# Patient Record
Sex: FEMALE | Race: BLACK OR AFRICAN AMERICAN | NOT HISPANIC OR LATINO | Employment: FULL TIME
[De-identification: names, ages, dates, MRNs, and addresses within clinical notes are randomized per-mention and may not be internally consistent; named-entity substitution may affect disease eponyms.]

---

## 2017-01-04 ENCOUNTER — RECORDS - HEALTHEAST (OUTPATIENT)
Dept: ADMINISTRATIVE | Facility: OTHER | Age: 47
End: 2017-01-04

## 2017-01-11 ENCOUNTER — RECORDS - HEALTHEAST (OUTPATIENT)
Dept: ADMINISTRATIVE | Facility: OTHER | Age: 47
End: 2017-01-11

## 2017-03-08 ENCOUNTER — RECORDS - HEALTHEAST (OUTPATIENT)
Dept: ADMINISTRATIVE | Facility: OTHER | Age: 47
End: 2017-03-08

## 2017-03-21 ENCOUNTER — TELEPHONE (OUTPATIENT)
Dept: TRANSPLANT | Facility: CLINIC | Age: 47
End: 2017-03-21

## 2017-03-21 ENCOUNTER — APPOINTMENT (OUTPATIENT)
Dept: TRANSPLANT | Facility: CLINIC | Age: 47
End: 2017-03-21
Attending: INTERNAL MEDICINE
Payer: COMMERCIAL

## 2017-03-21 NOTE — LETTER
May 9, 2017      Alla Shrestha  [address]  [place] [address]        Dear Ms. Shrestha,    You were successfully registered at our Center on March 21, 2017 as potential pre-kidney transplant patient. I have called you on the phone today and left voicemail messages on your home and cell #'s asking you to call me. At this time, your insurance does meet criteria to proceed with scheduling a pre-kidney transplant evaluation. However, at this time your Body Mass Index ( BMI ) is equal to 46 for your height of 5 foot 7 inches and weight of 294 pounds.     The upper BMI limit for kidney transplantation at our Center is 35 which would equal a weight of 230 pounds for your height.  A pre-kidney transplant evaluation can begin at at BMI of 38 which would be equal to a weight of 250 pounds for your height. Your BMI would still need to be at 35 or less in order to actually proceed with a kidney transplant.      Please call me again when your weight is at 250 pounds and I will be happy to arrange appointments here for you to attend a pre kidney transplant evaluation.      If you would like to attend appointments here now to see a transplant surgeon regarding the weight requirements for kidney transplant and to attend the Pre-Kidney Transplant Patient Teaching Class, this can be arranged for you now.  Please call me now if you wish to proceed with these appointments. A full evaluation would be scheduled later when your BMI is equal to 38.     You do have the option of attending our Weight Management Clinic for assistance in losing weight. Please call [phone] to make an appointment for this.     Please feel free to call me with any questions at [phone].     Sincerely,     Carolin Underwood RN BSN Transplant Coordinator     Enclosure: UNOS Letter

## 2017-03-21 NOTE — LETTER
March 21, 2017            Alla Shrestha  [address]  [place] [address]      Dear Dr. Capellan,    We are writing to inform you that Alla Shrestha has completed the referral process with us to be evaluated for a kidney transplant.    Their assigned Transplant Coordinator is Carolin Underwood.  If you should have any questions or concerns, please don t hesitate to contact us.        Regards,           Solid Organ Transplant Intake   [facility], [address]  [address], [facility] [address]  Phone: [phone]  [place] [address]

## 2017-03-21 NOTE — TELEPHONE ENCOUNTER
Intake Progress Note  Organ:  kidney    Referral Came Via (Fax from/phone call from):  fax    Referring Physician:  janet lion    Assigned Coordinator:  Carolin mccoy    Reported Diagnosis that caused the kidney failure ( not CKD)  Hypertension    Best time patient can be reached:  8-4    Type of packet sent:  kidney    Records:  [facility] requested from: [facility],kidney specialist      Insurance information:  Current in epic    History of diabetes:  No    Do you have an endocrinologist?: no           On insulin or oral medication:  No                 History of a kidney biopsy:  Yes         If Yes,when and where:  [facility] 8 1/2 years ago    Past Medical and Surgical History (updated in Epic medical / surgical):             History of  cancer personally:  No                          History of cardiac events:  No                       History abdominal surgeries:             History of previous transplant: No                        Listed or in eval at another transplant center?  No             History of hospitalization in last 12 months:  No                        History of blood transfusion:  Yes               Smoking history:  Yes     Current smoker:  Yes  How much?4 aday        On dialysis: No       If NYOD, estimated GFR:  19  Height:  5'7  Weight:  290  BMI:  45.4    Health Maintenance:  PAP:  No had hysterectomy 18 years ago  Mammo:  Never had one  Colon: never had one  Dental: not up to date its been 1 1/2 years  Vaccines:  up to date   Special Needs (ie--wheelchair, assistance, guardian, interpretor):  no      Informed patient on the need to arrange age appropriate cancer screening, vaccines up to date and dental clearance    Reviewed evaluation process and reminded patient to complete questionnaire, complete medical records release, and review packet prior to evaluation visit     Informed patient that coordinator will review their chart and insurance coverage and if no concerns they will  receive a call from a  to schedule evaluation

## 2017-03-22 ENCOUNTER — MEDICAL CORRESPONDENCE (OUTPATIENT)
Dept: TRANSPLANT | Facility: CLINIC | Age: 47
End: 2017-03-22

## 2017-03-22 ENCOUNTER — RECORDS - HEALTHEAST (OUTPATIENT)
Dept: ADMINISTRATIVE | Facility: OTHER | Age: 47
End: 2017-03-22

## 2017-03-23 ENCOUNTER — MEDICAL CORRESPONDENCE (OUTPATIENT)
Dept: TRANSPLANT | Facility: CLINIC | Age: 47
End: 2017-03-23

## 2017-04-20 ENCOUNTER — RECORDS - HEALTHEAST (OUTPATIENT)
Dept: ADMINISTRATIVE | Facility: OTHER | Age: 47
End: 2017-04-20

## 2017-05-09 NOTE — TELEPHONE ENCOUNTER
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 Liliya Capellan.  Note also documents pt's height as 5 foot 7 inches and weight at 294 pounds to equal a BMI of 46 - does not meet criteria to proceed with a kidney transplant eval at this time.  Will not plan to schedule pre kidney transplant evaluation. Called pt today on her home and cell #'s and LM asking her to call me. Generated letter today in EPIC - recommended that pt recontacts me when her BMI is down to 38 or weight of 250 pounds at which time an eval can be scheduled - will still need BMI of 35 or less to proceed with kidney transplant. Letter routed to  to mail.

## 2017-05-09 NOTE — TELEPHONE ENCOUNTER
Spoke with pt today when she returned my call. I reviewed our BMI criteria for kidney transplant and so on. Pt very interested in making an appt with [facility] as she does not have any plans right now to lose weight but is interested in this. Pt expressed good understanding that she should call me back when her weight is at 250 pounds so a full transplant eval can be scheduled. I explained I have sent her a letter today regarding all content of this conversation. Instructed pt to read letter, be sure to call me back when wt is 250 pounds and call me at any other time she has questions. Pt expressed good understanding of all and was in good agreement with the plan. Transferred pt's call to [facility].

## 2017-05-16 ENCOUNTER — RECORDS - HEALTHEAST (OUTPATIENT)
Dept: ADMINISTRATIVE | Facility: OTHER | Age: 47
End: 2017-05-16

## 2017-05-23 ENCOUNTER — OFFICE VISIT (OUTPATIENT)
Dept: ENDOCRINOLOGY | Facility: CLINIC | Age: 47
End: 2017-05-23

## 2017-05-23 VITALS
SYSTOLIC BLOOD PRESSURE: 148 MMHG | OXYGEN SATURATION: 97 % | HEART RATE: 86 BPM | HEIGHT: 68 IN | BODY MASS INDEX: 43.86 KG/M2 | DIASTOLIC BLOOD PRESSURE: 89 MMHG | WEIGHT: 289.4 LBS

## 2017-05-23 DIAGNOSIS — E66.01 MORBID OBESITY DUE TO EXCESS CALORIES (H): Primary | ICD-10-CM

## 2017-05-23 ASSESSMENT — ENCOUNTER SYMPTOMS: BRUISES/BLEEDS EASILY: 1

## 2017-05-23 NOTE — LETTER
"2017       RE: Alla Shrestha  [address]  [place] [address]     Dear Colleague,    Thank you for referring your patient, Alla Shrestha, to the [facility] MEDICAL WEIGHT MANAGEMENT at [facility]. Please see a copy of my visit note below.          New Medical Weight Management Consult    PATIENT:  Alla Shrestha  MRN:         [id number]  :         1970  RHONDA:         2017    Dear No primary care provider on file.,    I had the pleasure of seeing your patient, Alla Shrestha.  Full intake/assessment done to determine barriers to weight loss success and develop a treatment plan.  Alla Shrestha is a 46 year old female interested in treatment of medical problems associated with weight.  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    Patient Goals Reviewed With Patient 2017   I am interested in attaining a healthier weight to diminish current health problems related to co-morbid conditions: Yes   I am interested in attaining a healthier weight in order to prevent future health problems: Yes       Referring Provider 2017   Please name the provider who referred you to Medical Weight Management.  If you do not know, please answer: \"I Don't Know\". I dont know       Wt Readings from Last 4 Encounters:   17 131.3 kg (289 lb 6.4 oz)     She gained weight for the past 26 years after her pregnancy. She feels that it is a combination of unhealthy food choice and lack of exercise. She feels fatigue and SOB easily so she could not go for a long walk. Upon asked, she loves to eat rice. She stated that she might be overeat at meal. She drinks 4 can of regular ginger ale and 1 energy drink per day.    Diet " recalled:  BF: skipped or bagel+creme cheese, eggx2   Lunch and dinner: left over -- chicken with rice or pasta  Snack: pineapple or granola bar    Weight History Reviewed With Patient 5/23/2017   How concerned are you about your weight? Very Concerned   Would you describe your weight gain as gradual? No   I became overweight: After Pregnancy   The following factors have contributed to my weight gain:  Eating Wrong Types of Food, Eating Too Much, Lack of Exercise   I have tried the following methods to lose weight: Watching Portions or Calories, Exercise, Slim Fast or Other Liquid Diets   I have the following family history of obesity/being overweight:  My mother is overwieght, One or more of my siblings are overweight   Has anyone in your family had weight loss surgery? No       Diet Recall Reviewed With Patient 5/23/2017   How many glasses of juice do you drink in a typical day? 0   How many of glasses of milk do you drink in a typical day? 0   How many 8oz glasses of sugar containing drinks such as [employer]/sweet tea do you drink in a day? 0   How many cans/bottles of sugar pop/soda/tea/sports drinks do you drink in a day? 4   How many cans/bottles of diet pop/soda/tea or sports drink do you drink in a day? 1   How often do you have a drink of alcohol? 2-4 Times a Month   If you do drink, how many drinks might you have in a day? 1 or 2       Eating Habits Reviewed With Patient 5/23/2017   Generally, my meals include foods like these: bread, pasta, rice, potatoes, corn, crackers, sweet dessert, pop, or juice. Everyday   Generally, my meals include foods like these: fried meats, brats, burgers, french fries, pizza, cheese, chips, or ice cream. Everyday   Eat fast food (like [employer], [employer], [employer]aco [employer]). Never   Eat at a buffet or sit-down restaurant. Never   Eat most of my meals in front of the TV or computer. Almost Everyday   Often skip meals, eat at random times, have no regular eating times. Half of the  Week   Rarely sit down for a meal but snack or graze throughout.  Almost Everyday   Eat extra snacks between meals. Almost Everyday   Eat most of my food at the end of the day. Half of the Week   Eat in the middle of the night or wake up at night to eat. Never   Eat extra snacks to prevent or correct low blood sugar. Never   Eat to prevent acid reflux or stomach pain. Never   Worry about not having enough food to eat. Never   Have you been to the food shelf at least a few times this year? No   I eat when I am depressed, stressed, anxious, or bored. Almost Everyday   I eat when I am happy or as a reward. Almost Everyday   I feel hungry all the time even if I just have eaten. Never   Feeling full is important to me. Everyday   Once I start eating, it is hard to stop. Half of the Week   I finish all the food on my plate even if I am already full. Almost Everyday   I can't resist eating delicious food or walk past the good food/smell. Almost Everyday   I eat/snack without noticing that I am eating. Never   I eat when I am preparing the meal. Everyday   I eat more than usual when I see others eating. Never   I have trouble not eating sweets, ice cream, cookies, or chips if they are around the house. Never   I think about food all day. Almost Everyday   Please list any other foods you crave? Meat   I feel out of control when eating. Weekly   I eat a large amount of food, like a loaf of bread, a box of cookies, a pint/quart of ice cream, all at once. Never   I eat a large amount of food even when I am not hungry. Never   I eat rapidly. Everyday   I eat alone because I feel embarrassed and do not want others to see how much I have eaten. Never   I eat until I am uncomfortably full. Almost Everyday   I feel bad, disgusted, or guilty after I overeat. Never   I make myself vomit what I have eaten or use laxatives to get rid of food. Never       Activity/Exercise History Reviewed With Patient 5/23/2017   How much of a typical  12 hour day do you spend sitting? Most of the Day   How much of a typical 12 hour day do you spend lying down? Less Than Half the Day   How much of a typical day do you spend walking/standing? Less Than Half the Day   How many hours (not including work) do you spend on the TV/Video Games/Computer/Tablet/Phone? 2-3 Hours   How many times a week are you active for the purpose of exercise? Never   How many total minutes do you spend doing some activity for the purpose of exercising when you exercise? Less Than 15 Minutes   What keeps you from being more active? Pain, Shortness of Breath, Too tired       ROS    PAST MEDICAL HISTORY:  History reviewed. No pertinent past medical history.    Work/Social History Reviewed With Patient 5/23/2017   My employment status is: Full-Time   My job is: Customer  Care Specialist   How much of your job is spent on the computer or phone? 100%   What is your marital status? /In a Relationship   If in a relationship, is your significant other overweight? No   Do you have children? Yes   If you have children, are they overweight? Yes       Mental Health History Reviewed With Patient 5/23/2017   Have you ever been physically or sexually abused? No   How often in the past 2 weeks have you felt little interest or pleasure in doing things? More Than Half the Days   Over the past 2 weeks how often have you felt down, depressed, or hopeless? Not at all       Sleep History Reviewed With Patient 5/23/2017   How many hours do you sleep at night? 8   Do you think that you snore loudly or has anybody ever heard you snore loudly (louder than talking or so loud it can be heard behind a shut door)? No   Has anyone seen or heard you stop breathing during your sleep? No   Do you often feel tired, fatigued, or sleepy during the day? Yes       MEDICATIONS:   Current Outpatient Prescriptions   Medication Sig Dispense Refill     lisinopril (PRINIVIL,ZESTRIL) 40 MG tablet Take 1 tablet (40 mg) by mouth  "daily 30 tablet      calcitRIOL (ROCALTROL) 0.25 MCG capsule Take 0.25 mcg by mouth daily.       metoprolol (LOPRESSOR) 25 MG tablet Take 25 mg by mouth 2 times daily.       simvastatin (ZOCOR) 20 MG tablet Take 1 tablet by mouth daily.       sodium bicarbonate 650 MG tablet Take 325 mg by mouth 3 times daily.       traZODone (DESYREL) 50 MG tablet Take 1 tablet by mouth At Bedtime.         ALLERGIES:   Allergies   Allergen Reactions     Penicillins        PHYSICAL EXAM:  /89  Pulse 86  Ht 1.715 m (5' 7.5\")  Wt 131.3 kg (289 lb 6.4 oz)  SpO2 97%  BMI 44.66 kg/m2   A & O x 3  HEENT: NCAT, mucous membranes moist  Respirations unlabored  Location of obesity: Mixed Obesity    ASSESSMENT:  Alla is a patient with mature onset obesity with significant element of familial/genetic influence and with current health consequences. She does need aggressive weight loss plan due to plan for kidney transplant.  Alla Shrestha eats a high carb diet, eats a high fat diet, eats to obtain specific degree of fullness, eats most meals in front of TV, mostly eats during the evening, tends to snack/graze throughout day, rarely sitting to eat a true meal and has a disorganized meal pattern.    Her problem is complicated by strong craving/reward pathways and poor lifestyle choices    Her ability to lose weight is impacted by lack of education on nutrition and dietary needs.    PLAN:    Decrease portion sizes  Decrease eating out  Purge house of food triggers  Decrease caloric beverages  Dietician visit of education  Calorie/low fat diet  Meal planning    Craving/Reward   Ancillary testing:  N/A.  Food Plan:  Volumetrics and High protein/low carbohydrate.   Activity Plan:  Exercise after meals.  Supplementary:  N/A.   Medication:  Discussed but no medication today due to CKD    Will focus on diet plan -- reduce portion size, reduce sugary beverage, increase healthier food choice    RTC:    Refer dietitian  2 months to see " Maggie Natarajan  4 months to see me    TIME: 30 min spent on evaluation, management, counseling, education, & motivational interviewing with greater than 50 % of the total time was spent on counseling and coordinating care    Sincerely,    Sunshine Turner MD

## 2017-05-23 NOTE — NURSING NOTE
"Chief Complaint   Patient presents with     Weight Problem     NMWM       Vitals:    05/23/17 1602   BP: 148/89   Pulse: 86   SpO2: 97%   Weight: 289 lb 6.4 oz   Height: 5' 7.5\"       Body mass index is 44.66 kg/(m^2).    Carlos De La Cruz CMA                          "

## 2017-05-23 NOTE — PATIENT INSTRUCTIONS
Please make an appointment  With our Dieticians Comfort or Emilie as soon as possible.    Follow up with Dr Gonsalez in 4 months.    Arrange an appointment with Maggie Natarajan in 2 months.    Call with questions or concerns [phone]       We agree with your plan for improving your health by lowering your weight. Studies show that your health can be improved a lot by only 10% weight loss.     The main way that you can help yourself is by eating certain kinds of foods, mostly lean meats and vegetables, that will fill you up while giving you fewer calories. The foods that will help you the most are lean meats and vegetables. We can help you learn to find ways to eat these foods more and like them better. Foods that are not so helpful with filling you up are foods that are dry and crunchy like crackers, cookies, and chips.  It s best to keep these foods out of the house altogether     Studies have also shown that exercise helps people manage their hunger and weight, and often can actually reduce the amount of hunger. We can ask for help for you to find ways to have more activity safely.    Depressed Emotional    We believe that your weight problem is strongly related to eating food when you are sad, depressed, anxious, or having trouble regulating your mood.      The three main ways to help yourself with this problem is by:  1) Keeping a journal to learn why you eat and 2) Learning to do other things like taking a walk, instead of eating, to make yourself feel better 3) Learning to eat only when you are actually physically hungry and not emotionally hungry.  (If you must eat something even if you are nor truly hungry we suggest  small amounts of warm, clear soups, decaf teas, or sugarless gum as things that may keep you from eating high calorie foods that could lead to further weight gain.)      Eating certain kinds of foods that will fill you up while giving you fewer calories. The foods that will help you the most  are lean meats and vegetables. We can help you learn to find ways to eat these foods more and like them better. In many cases, we find that people are helped by eating foods that they find  just OK  about and by staying away (as much as possible) from the foods that they really love.        Studies have also shown that exercise or activity helps people reduce the amount of hunger and just generally feel better. We help you find ways to have more activity safely.     Sometimes people need help from medication. All medications can have side effects, so they must be used carefully. We can work with you to find medications that help you without too many side effects. If you are taking a medication, it is very important that you call us with any questions or problems when you have them, and not wait until the next clinic visit.

## 2017-05-23 NOTE — PROGRESS NOTES
"      New Medical Weight Management Consult    PATIENT:  Alla Shrestha  MRN:         [id number]  :         1970  RHONDA:         2017    Dear No primary care provider on file.,    I had the pleasure of seeing your patient, Alla Shrestha.  Full intake/assessment done to determine barriers to weight loss success and develop a treatment plan.  Alla Shrestha is a 46 year old female interested in treatment of medical problems associated with weight.  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    Patient Goals Reviewed With Patient 2017   I am interested in attaining a healthier weight to diminish current health problems related to co-morbid conditions: Yes   I am interested in attaining a healthier weight in order to prevent future health problems: Yes       Referring Provider 2017   Please name the provider who referred you to Medical Weight Management.  If you do not know, please answer: \"I Don't Know\". I dont know       Wt Readings from Last 4 Encounters:   17 131.3 kg (289 lb 6.4 oz)     She gained weight for the past 26 years after her pregnancy. She feels that it is a combination of unhealthy food choice and lack of exercise. She feels fatigue and SOB easily so she could not go for a long walk. Upon asked, she loves to eat rice. She stated that she might be overeat at meal. She drinks 4 can of regular ginger ale and 1 energy drink per day.    Diet recalled:  BF: skipped or bagel+creme cheese, eggx2   Lunch and dinner: left over -- chicken with rice or pasta  Snack: pineapple or granola bar    Weight History Reviewed With Patient 2017   How concerned are you about your weight? Very Concerned   Would you describe your weight gain as gradual? No   I " became overweight: After Pregnancy   The following factors have contributed to my weight gain:  Eating Wrong Types of Food, Eating Too Much, Lack of Exercise   I have tried the following methods to lose weight: Watching Portions or Calories, Exercise, Slim Fast or Other Liquid Diets   I have the following family history of obesity/being overweight:  My mother is overwieght, One or more of my siblings are overweight   Has anyone in your family had weight loss surgery? No       Diet Recall Reviewed With Patient 5/23/2017   How many glasses of juice do you drink in a typical day? 0   How many of glasses of milk do you drink in a typical day? 0   How many 8oz glasses of sugar containing drinks such as Nick-Aid/sweet tea do you drink in a day? 0   How many cans/bottles of sugar pop/soda/tea/sports drinks do you drink in a day? 4   How many cans/bottles of diet pop/soda/tea or sports drink do you drink in a day? 1   How often do you have a drink of alcohol? 2-4 Times a Month   If you do drink, how many drinks might you have in a day? 1 or 2       Eating Habits Reviewed With Patient 5/23/2017   Generally, my meals include foods like these: bread, pasta, rice, potatoes, corn, crackers, sweet dessert, pop, or juice. Everyday   Generally, my meals include foods like these: fried meats, brats, burgers, french fries, pizza, cheese, chips, or ice cream. Everyday   Eat fast food (like McDonal[employer], [employer], [employer]). Never   Eat at a buffet or sit-down restaurant. Never   Eat most of my meals in front of the TV or computer. Almost Everyday   Often skip meals, eat at random times, have no regular eating times. Half of the Week   Rarely sit down for a meal but snack or graze throughout.  Almost Everyday   Eat extra snacks between meals. Almost Everyday   Eat most of my food at the end of the day. Half of the Week   Eat in the middle of the night or wake up at night to eat. Never   Eat extra snacks to prevent or correct low blood  sugar. Never   Eat to prevent acid reflux or stomach pain. Never   Worry about not having enough food to eat. Never   Have you been to the food shelf at least a few times this year? No   I eat when I am depressed, stressed, anxious, or bored. Almost Everyday   I eat when I am happy or as a reward. Almost Everyday   I feel hungry all the time even if I just have eaten. Never   Feeling full is important to me. Everyday   Once I start eating, it is hard to stop. Half of the Week   I finish all the food on my plate even if I am already full. Almost Everyday   I can't resist eating delicious food or walk past the good food/smell. Almost Everyday   I eat/snack without noticing that I am eating. Never   I eat when I am preparing the meal. Everyday   I eat more than usual when I see others eating. Never   I have trouble not eating sweets, ice cream, cookies, or chips if they are around the house. Never   I think about food all day. Almost Everyday   Please list any other foods you crave? Meat   I feel out of control when eating. Weekly   I eat a large amount of food, like a loaf of bread, a box of cookies, a pint/quart of ice cream, all at once. Never   I eat a large amount of food even when I am not hungry. Never   I eat rapidly. Everyday   I eat alone because I feel embarrassed and do not want others to see how much I have eaten. Never   I eat until I am uncomfortably full. Almost Everyday   I feel bad, disgusted, or guilty after I overeat. Never   I make myself vomit what I have eaten or use laxatives to get rid of food. Never       Activity/Exercise History Reviewed With Patient 5/23/2017   How much of a typical 12 hour day do you spend sitting? Most of the Day   How much of a typical 12 hour day do you spend lying down? Less Than Half the Day   How much of a typical day do you spend walking/standing? Less Than Half the Day   How many hours (not including work) do you spend on the TV/Video Games/Computer/Tablet/Phone?  2-3 Hours   How many times a week are you active for the purpose of exercise? Never   How many total minutes do you spend doing some activity for the purpose of exercising when you exercise? Less Than 15 Minutes   What keeps you from being more active? Pain, Shortness of Breath, Too tired       ROS    PAST MEDICAL HISTORY:  History reviewed. No pertinent past medical history.    Work/Social History Reviewed With Patient 5/23/2017   My employment status is: Full-Time   My job is: Customer  Care Specialist   How much of your job is spent on the computer or phone? 100%   What is your marital status? /In a Relationship   If in a relationship, is your significant other overweight? No   Do you have children? Yes   If you have children, are they overweight? Yes       Mental Health History Reviewed With Patient 5/23/2017   Have you ever been physically or sexually abused? No   How often in the past 2 weeks have you felt little interest or pleasure in doing things? More Than Half the Days   Over the past 2 weeks how often have you felt down, depressed, or hopeless? Not at all       Sleep History Reviewed With Patient 5/23/2017   How many hours do you sleep at night? 8   Do you think that you snore loudly or has anybody ever heard you snore loudly (louder than talking or so loud it can be heard behind a shut door)? No   Has anyone seen or heard you stop breathing during your sleep? No   Do you often feel tired, fatigued, or sleepy during the day? Yes       MEDICATIONS:   Current Outpatient Prescriptions   Medication Sig Dispense Refill     lisinopril (PRINIVIL,ZESTRIL) 40 MG tablet Take 1 tablet (40 mg) by mouth daily 30 tablet      calcitRIOL (ROCALTROL) 0.25 MCG capsule Take 0.25 mcg by mouth daily.       metoprolol (LOPRESSOR) 25 MG tablet Take 25 mg by mouth 2 times daily.       simvastatin (ZOCOR) 20 MG tablet Take 1 tablet by mouth daily.       sodium bicarbonate 650 MG tablet Take 325 mg by mouth 3 times daily.   "     traZODone (DESYREL) 50 MG tablet Take 1 tablet by mouth At Bedtime.         ALLERGIES:   Allergies   Allergen Reactions     Penicillins        PHYSICAL EXAM:  /89  Pulse 86  Ht 1.715 m (5' 7.5\")  Wt 131.3 kg (289 lb 6.4 oz)  SpO2 97%  BMI 44.66 kg/m2   A & O x 3  HEENT: NCAT, mucous membranes moist  Respirations unlabored  Location of obesity: Mixed Obesity    ASSESSMENT:  Alla is a patient with mature onset obesity with significant element of familial/genetic influence and with current health consequences. She does need aggressive weight loss plan due to plan for kidney transplant.  Alla Shrestha eats a high carb diet, eats a high fat diet, eats to obtain specific degree of fullness, eats most meals in front of TV, mostly eats during the evening, tends to snack/graze throughout day, rarely sitting to eat a true meal and has a disorganized meal pattern.    Her problem is complicated by strong craving/reward pathways and poor lifestyle choices    Her ability to lose weight is impacted by lack of education on nutrition and dietary needs.    PLAN:    Decrease portion sizes  Decrease eating out  Purge house of food triggers  Decrease caloric beverages  Dietician visit of education  Calorie/low fat diet  Meal planning    Craving/Reward   Ancillary testing:  N/A.  Food Plan:  Volumetrics and High protein/low carbohydrate.   Activity Plan:  Exercise after meals.  Supplementary:  N/A.   Medication:  Discussed but no medication today due to CKD    Will focus on diet plan -- reduce portion size, reduce sugary beverage, increase healthier food choice    RTC:    Refer dietitian  2 months to see Maggie Natarajan  4 months to see me    TIME: 30 min spent on evaluation, management, counseling, education, & motivational interviewing with greater than 50 % of the total time was spent on counseling and coordinating care    Sincerely,    Sunshine Turner MD      "

## 2017-05-23 NOTE — MR AVS SNAPSHOT
After Visit Summary   5/23/2017    Alla Shrestha    MRN: [id number]           Patient Information     Date Of Birth          1970        Visit Information        Provider Department      5/23/2017 3:30 PM Sunshine Turner MD  Health Medical Weight Management        Care Instructions      Please make an appointment  With our Dieticians Comfort or Emilie as soon as possible.    Follow up with Dr Gonsalez in 4 months.    Arrange an appointment with Maggie Natarajan in 2 months.    Call with questions or concerns [phone]       We agree with your plan for improving your health by lowering your weight. Studies show that your health can be improved a lot by only 10% weight loss.     The main way that you can help yourself is by eating certain kinds of foods, mostly lean meats and vegetables, that will fill you up while giving you fewer calories. The foods that will help you the most are lean meats and vegetables. We can help you learn to find ways to eat these foods more and like them better. Foods that are not so helpful with filling you up are foods that are dry and crunchy like crackers, cookies, and chips.  It s best to keep these foods out of the house altogether     Studies have also shown that exercise helps people manage their hunger and weight, and often can actually reduce the amount of hunger. We can ask for help for you to find ways to have more activity safely.    Depressed Emotional    We believe that your weight problem is strongly related to eating food when you are sad, depressed, anxious, or having trouble regulating your mood.      The three main ways to help yourself with this problem is by:  1) Keeping a journal to learn why you eat and 2) Learning to do other things like taking a walk, instead of eating, to make yourself feel better 3) Learning to eat only when you are actually physically hungry and not emotionally hungry.  (If you must eat something even if you are nor  truly hungry we suggest  small amounts of warm, clear soups, decaf teas, or sugarless gum as things that may keep you from eating high calorie foods that could lead to further weight gain.)      Eating certain kinds of foods that will fill you up while giving you fewer calories. The foods that will help you the most are lean meats and vegetables. We can help you learn to find ways to eat these foods more and like them better. In many cases, we find that people are helped by eating foods that they find  just OK  about and by staying away (as much as possible) from the foods that they really love.        Studies have also shown that exercise or activity helps people reduce the amount of hunger and just generally feel better. We help you find ways to have more activity safely.     Sometimes people need help from medication. All medications can have side effects, so they must be used carefully. We can work with you to find medications that help you without too many side effects. If you are taking a medication, it is very important that you call us with any questions or problems when you have them, and not wait until the next clinic visit.          Follow-ups after your visit        Who to contact     Please call your clinic at [phone] to:    Ask questions about your health    Make or cancel appointments    Discuss your medicines    Learn about your test results    Speak to your doctor   If you have compliments or concerns about an experience at your clinic, or if you wish to file a complaint, please contact [facility] Patient Relations at [phone] or email us at [email or web address]         Additional Information About Your Visit        [employer] Information     [employer] is an electronic gateway that provides easy, online access to your medical records. With [employer], you can request a clinic appointment, read your test results, renew a prescription or communicate with your  "care team.     To sign up for MyChart visit the website at [email or web address]   You will be asked to enter the access code listed below, as well as some personal information. Please follow the directions to create your username and password.     Your access code is: [email or web address]  Expires: 2017  6:30 AM     Your access code will  in 90 days. If you need help or a new code, please contact your [facility] or call [phone] for assistance.        Care EveryWhere ID     This is your Care EveryWhere ID. This could be used by other organizations to access your [place] medical records  [id number]        Your Vitals Were     Pulse Height Pulse Oximetry BMI (Body Mass Index)          86 1.715 m (5' 7.5\") 97% 44.66 kg/m2         Blood Pressure from Last 3 Encounters:   17 148/89    Weight from Last 3 Encounters:   17 131.3 kg (289 lb 6.4 oz)              Today, you had the following     No orders found for display       Primary Care Provider    None Specified       No primary provider on file.        Thank you!     Thank you for choosing [facility] WEIGHT MANAGEMENT  for your care. Our goal is always to provide you with excellent care. Hearing back from our patients is one way we can continue to improve our services. Please take a few minutes to complete the written survey that you may receive in the mail after your visit with us. Thank you!             Your Updated Medication List - Protect others around you: Learn how to safely use, store and throw away your medicines at www.disposemymeds.org.          This list is accurate as of: 17  4:30 PM.  Always use your most recent med list.                   Brand Name Dispense Instructions for use    calcitRIOL 0.25 MCG capsule    ROCALTROL     Take 0.25 mcg by mouth daily.       lisinopril 40 MG tablet    PRINIVIL/ZESTRIL    30 tablet    Take 1 tablet (40 mg) by mouth daily       metoprolol 25 MG tablet "    LOPRESSOR     Take 25 mg by mouth 2 times daily.       simvastatin 20 MG tablet    ZOCOR     Take 1 tablet by mouth daily.       sodium bicarbonate 650 MG tablet      Take 325 mg by mouth 3 times daily.       traZODone 50 MG tablet    DESYREL     Take 1 tablet by mouth At Bedtime.

## 2017-06-07 ENCOUNTER — ALLIED HEALTH/NURSE VISIT (OUTPATIENT)
Dept: SURGERY | Facility: CLINIC | Age: 47
End: 2017-06-07

## 2017-06-07 NOTE — PATIENT INSTRUCTIONS
Nutrition Goals  1) Focus on lean protein and non-starchy vegetables at meals.    -Try zucchini pasta using a spiralizer at [employer] - sautee and add your sauce/meat   -Try cauliflower rice ( Joes has it in the refrigerated and freezer sections) - cook it with chicken broth.    -Try the tonya lettuce leaf rather than bread for sandwiches.   -For Breakfast, you could try lactose-free Muscle Milk or another one with about 200 Hector, at least 20 g protein and less than 10 g sugar  2) Continue to avoid sugary beverages.   3) Record foods and amounts you are eating throughout the day.

## 2017-06-07 NOTE — PROGRESS NOTES
"Alla Shrestha is a 46 year old female with kidney disease (not on dialysis) presents today for new weight management nutrition consultation.  Pt was referred by Dr. Sunshine Turner on 5/23/17.    Anthropometrics    Height as of 5/23/17: 1.715 m (5' 7.5\").    Initial Weight as of 5/23/17: 131.3 kg (289.4 lbs) with BMI of 44.66  Current Weight: 280.7 lbs  (-8.7 lbs)    Nutrition history  Lactose Intolerance per Pt report.     See MD note for details.  Pt has cut-out sugar ginger-sundar and now drinks diet ginger-sundar and Crystal Light.  She also gave up potato chips and fried chicken.  She is eating jellyfish granola bars slowly.  She cut-back from 4 chicken legs to just 2.     Diet Recall:   Breakfast: Dole cup of pineapple + 2 eggs and sausage  Lunch: sandwiches  Supper: fish or chicken or turkey + vegetables (corn, broccoli, cauliflower)  Snacks: granola bars at work  Beverages: water, diet ginger-sundar, Crystal Light    Exercise: Pt is hindered by her knee pain and would prefer to hold-off on any goal setting for exercise.    Nutrition Prescription  Volumetrics Eating Plan (per MD)    Nutrition Diagnosis  Obesity related to history of excessive energy intake as evidenced by BMI > 30.     Nutrition Intervention  Materials/education provided on Volumetric diet with portion control and healthy food choices (Plate Method handout), 100 calorie snack choices, meal and snack planning and websites, meal replacements, recipe resources, sample meal plans.    Patient Understanding: good  Expected Compliance: good     Nutrition Goals  1) Focus on lean protein and non-starchy vegetables at meals.    -Try zucchini pasta using a spiralizer at [employer] - sautee and add your sauce/meat   -Try cauliflower rice ( Jo[employer] has it in the refrigerated and freezer sections) - cook it with chicken broth.    -Try the tonya lettuce leaf rather than bread for sandwiches.   -For Breakfast, you could try lactose-free " Muscle Milk or another one with about 200 Hector, at least 20 g protein and less than 10 g sugar  2) Continue to avoid sugary beverages.   3) Record foods and amounts you are eating throughout the day.     Follow-Up: PRN    Time spent with patient: 30 minutes.  Comfort Cooper MS, RDN, LDN, CLT  Pager: [phone]

## 2017-06-07 NOTE — MR AVS SNAPSHOT
MRN:[id number]                      After Visit Summary   6/7/2017    Alla Shrestha    MRN: [id number]           Visit Information        Provider Department      6/7/2017 4:30 PM Comfort Cooper RD M [facility] Surgical Weight Management        Your next 10 appointments already scheduled     Jul 25, 2017  3:30 PM CDT   (Arrive by 3:15 PM)   New Weight Management Visit with BETHANY Huber [facility] Medical Weight Management ([facility])    [address] [place] [id number]   [phone]            Sep 26, 2017  4:30 PM CDT   (Arrive by 4:15 PM)   Return Visit with MD HENNY Reed [facility] Medical Weight Management ([facility])    [address] [place] [id number]   [phone]              Care Instructions      Nutrition Goals  1) Focus on lean protein and non-starchy vegetables at meals.    -Try zucchini pasta using a spiralizer at [employer] - sautee and add your sauce/meat   -Try cauliflower rice ( Jo[employer] has it in the refrigerated and freezer sections) - cook it with chicken broth.    -Try the tonya lettuce leaf rather than bread for sandwiches.   -For Breakfast, you could try lactose-free Muscle Milk or another one with about 200 Hector, at least 20 g protein and less than 10 g sugar  2) Continue to avoid sugary beverages.   3) Record foods and amounts you are eating throughout the day.          [employer] Information     [employer] is an electronic gateway that provides easy, online access to your medical records. With [employer], you can request a clinic appointment, read your test results, renew a prescription or communicate with your care team.     To sign up for [employer] visit the website at [email or web address]   You will be asked to enter the access code listed below, as well as some personal information. Please follow the directions to  create your username and password.     Your access code is: [email or web address]  Expires: 2017  6:30 AM     Your access code will  in 90 days. If you need help or a new code, please contact your [facility] or call [phone] for assistance.        Care EveryWhere ID     This is your Care EveryWhere ID. This could be used by other organizations to access your [place] medical records  [id number]

## 2017-06-27 ENCOUNTER — RECORDS - HEALTHEAST (OUTPATIENT)
Dept: ADMINISTRATIVE | Facility: OTHER | Age: 47
End: 2017-06-27

## 2017-07-31 ENCOUNTER — OFFICE VISIT - HEALTHEAST (OUTPATIENT)
Dept: INTERNAL MEDICINE | Facility: CLINIC | Age: 47
End: 2017-07-31

## 2017-07-31 DIAGNOSIS — M79.669 CALF PAIN: ICD-10-CM

## 2017-07-31 DIAGNOSIS — Z00.00 ANNUAL PHYSICAL EXAM: ICD-10-CM

## 2017-07-31 DIAGNOSIS — M10.9 GOUT: ICD-10-CM

## 2017-07-31 DIAGNOSIS — Z00.00 HEALTHCARE MAINTENANCE: ICD-10-CM

## 2017-07-31 DIAGNOSIS — N18.4 CKD STAGE 4 SECONDARY TO HYPERTENSION (H): ICD-10-CM

## 2017-07-31 DIAGNOSIS — J45.30 MILD PERSISTENT ASTHMA WITHOUT COMPLICATION: ICD-10-CM

## 2017-07-31 DIAGNOSIS — I12.9 CKD STAGE 4 SECONDARY TO HYPERTENSION (H): ICD-10-CM

## 2017-07-31 DIAGNOSIS — Z12.31 ENCOUNTER FOR SCREENING MAMMOGRAM FOR BREAST CANCER: ICD-10-CM

## 2017-07-31 DIAGNOSIS — M25.562 LEFT KNEE PAIN: ICD-10-CM

## 2017-07-31 DIAGNOSIS — D64.9 ANEMIA: ICD-10-CM

## 2017-07-31 ASSESSMENT — MIFFLIN-ST. JEOR: SCORE: 1909.09

## 2017-08-01 LAB — LDLC SERPL CALC-MCNC: 71 MG/DL

## 2017-08-03 ENCOUNTER — TRANSFERRED RECORDS (OUTPATIENT)
Dept: HEALTH INFORMATION MANAGEMENT | Facility: CLINIC | Age: 47
End: 2017-08-03

## 2017-08-03 ENCOUNTER — OFFICE VISIT (OUTPATIENT)
Dept: ENDOCRINOLOGY | Facility: CLINIC | Age: 47
End: 2017-08-03

## 2017-08-03 ENCOUNTER — RECORDS - HEALTHEAST (OUTPATIENT)
Dept: MAMMOGRAPHY | Facility: CLINIC | Age: 47
End: 2017-08-03

## 2017-08-03 VITALS
BODY MASS INDEX: 41.18 KG/M2 | WEIGHT: 271.7 LBS | HEIGHT: 68 IN | DIASTOLIC BLOOD PRESSURE: 88 MMHG | HEART RATE: 83 BPM | OXYGEN SATURATION: 98 % | SYSTOLIC BLOOD PRESSURE: 144 MMHG

## 2017-08-03 DIAGNOSIS — Z12.31 ENCOUNTER FOR SCREENING MAMMOGRAM FOR MALIGNANT NEOPLASM OF BREAST: ICD-10-CM

## 2017-08-03 DIAGNOSIS — E66.01 MORBID OBESITY, UNSPECIFIED OBESITY TYPE (H): ICD-10-CM

## 2017-08-03 DIAGNOSIS — E66.01 MORBID OBESITY, UNSPECIFIED OBESITY TYPE (H): Primary | ICD-10-CM

## 2017-08-03 LAB
ALT SERPL W P-5'-P-CCNC: 16 U/L (ref 0–50)
AST SERPL W P-5'-P-CCNC: 7 U/L (ref 0–45)

## 2017-08-03 NOTE — LETTER
"8/3/2017       RE: Alla Shrestha  [address]  [place] [address]     Dear Colleague,    Thank you for referring your patient, Alla Shrestha, to the [facility] MEDICAL WEIGHT MANAGEMENT at [facility]. Please see a copy of my visit note below.        Return Medical Weight Management Note     Alla Shrestha  MRN:  [id number]  :  1970  RHONDA:  8/3/2017    Dear No primary care provider on file.,    I had the pleasure of seeing your patient Alla Shrestha.  She is a 46 year old female who I am continuing to see for treatment of obesity related to:       8/3/2017   I have the following co-morbidities associated with obesity: High Blood Pressure, Weight Bearing Joint Pain       INTERVAL HISTORY:  Needs to lose 50 lbs from 296 lbs before kidney transplant.  She has lost 18 lbs since last visit.      Saw Dr Gonsalez in May.  No med started due to CKD.      She has cut out ginger ale and bread and she is eating more vegetables.      CURRENT WEIGHT:   271 lbs 11.2 oz    Wt Readings from Last 4 Encounters:   17 271 lb 11.2 oz   17 289 lb 6.4 oz       Height:  5' 7.5\"  Body Mass Index:  Body mass index is 41.93 kg/(m^2).  Vitals:  /88  Pulse 83  Ht 5' 7.5\"  Wt 271 lb 11.2 oz  SpO2 98%  BMI 41.93 kg/m2      Initial consult weight was 289 on MWM consult Dr Gonsalez.  Weight change since last seen on 2017 is down 18 pounds.   Total loss is 18 pounds.    ROS    MEDICATIONS:   Current Outpatient Prescriptions   Medication     lisinopril (PRINIVIL,ZESTRIL) 40 MG tablet     calcitRIOL (ROCALTROL) 0.25 MCG capsule     metoprolol (LOPRESSOR) 25 MG tablet     simvastatin (ZOCOR) 20 MG tablet     sodium bicarbonate 650 MG tablet     traZODone (DESYREL) 50 MG tablet     No current facility-administered medications for this visit.        ASSESSMENT/PLAN:    46 y.o. Female here for MW follow up.  No meds started due to CKD.      Will discuss possibly " starting naltrexone with Dr Gonsalez    ALT/AST check today      FOLLOW-UP:      Dr Gonsalez 9/26/17        Time: 15 min spent on evaluation, management, counseling, education, & motivational interviewing with greater than 50 % of the total time was spent on counseling and coordinating care    Sincerely,    Maggie Natarajan PA-C

## 2017-08-03 NOTE — PROGRESS NOTES
"    Return Medical Weight Management Note     Alla Shrestha  MRN:  [id number]  :  1970  RHONDA:  8/3/2017    Dear No primary care provider on file.,    I had the pleasure of seeing your patient Alla Shrestha.  She is a 46 year old female who I am continuing to see for treatment of obesity related to:       8/3/2017   I have the following co-morbidities associated with obesity: High Blood Pressure, Weight Bearing Joint Pain       INTERVAL HISTORY:  Needs to lose 50 lbs from 296 lbs before kidney transplant.  She has lost 18 lbs since last visit.      Saw Dr Gonsalez in May.  No med started due to CKD.      She has cut out ginger ale and bread and she is eating more vegetables.      CURRENT WEIGHT:   271 lbs 11.2 oz    Wt Readings from Last 4 Encounters:   17 271 lb 11.2 oz   17 289 lb 6.4 oz       Height:  5' 7.5\"  Body Mass Index:  Body mass index is 41.93 kg/(m^2).  Vitals:  /88  Pulse 83  Ht 5' 7.5\"  Wt 271 lb 11.2 oz  SpO2 98%  BMI 41.93 kg/m2      Initial consult weight was 289 on MWM consult Dr Gonsalez.  Weight change since last seen on 2017 is down 18 pounds.   Total loss is 18 pounds.    ROS    MEDICATIONS:   Current Outpatient Prescriptions   Medication     lisinopril (PRINIVIL,ZESTRIL) 40 MG tablet     calcitRIOL (ROCALTROL) 0.25 MCG capsule     metoprolol (LOPRESSOR) 25 MG tablet     simvastatin (ZOCOR) 20 MG tablet     sodium bicarbonate 650 MG tablet     traZODone (DESYREL) 50 MG tablet     No current facility-administered medications for this visit.        ASSESSMENT/PLAN:    46 y.o. Female here for MWM follow up.  No meds started due to CKD.      Will discuss possibly starting naltrexone with Dr Gonsalez    ALT/AST check today        FOLLOW-UP:      Dr Gonsalez 17        Time: 15 min spent on evaluation, management, counseling, education, & motivational interviewing with greater than 50 % of the total time was spent on counseling and coordinating " care    Sincerely,    Maggie Natarajan PA-C

## 2017-08-03 NOTE — NURSING NOTE
"Chief Complaint   Patient presents with     Weight Problem     RMWM       Vitals:    08/03/17 1521   BP: 144/88   Pulse: 83   SpO2: 98%   Weight: 271 lb 11.2 oz   Height: 5' 7.5\"       Body mass index is 41.93 kg/(m^2).    Carlos De La Cruz CMA                       "

## 2017-08-03 NOTE — PATIENT INSTRUCTIONS
"MEDICATION DISCUSSED AT THIS APPOINTMENT  We are considering starting Naltrexone. Start with 1/2 tab 1-2 hours prior to the time you have the most trouble with cravings or extra hunger. If you are doing well you may switch to a whole tablet taken at the same time period.     WARNING: This medication blocks the action of opioid type pain medications. If you routinely take any medication like Codeine, Oxycontin,Percocet,Morphine,Dilaudid or Methodone, do not take this until you have talked with weight management staff. If you are planning surgery you should stop Naltrexone 4 days prior to the surgery. If you have an injury that requires pain medication, make sure the health care staff knows you take Naltrexone.     Call the nurse at [phone] if you have any questions or concerns. (Do not stop taking it if you don't think it's working. For some people it works without them knowing it.)    Naltrexone is a medication that is used most often to help people who are troubled by dependence on prescription pain killers or alcohol. It has also been found to help with weight loss. Although it's not currently FDA approved for weight loss, it has been used safely for a number of years to help people who are carrying extra weight.     Just how Naltrexone helps with weight loss has not been exactly determined.  It seems to work by quieting down brain signals related to strong food cravings. Many of our patients use the word \"addiction\" to describe their feelings and constant thoughts about food. It makes sense then to treat the feeling of dependence on food, outside of real hunger, with a medication designed to help with other sorts of dependence.     Our patients on Naltrexone find that they:    >feel less interest in food   >think less about food and eating and have more time to think of other things   >find it easier to push the plate away   >have an easier time eating less    For some of our patients, these feelings are " very immediate. Other patients, don't feel much of a change but find they've lost weight. Like all weight loss medications, Naltrexone works best when you help it work. This means:  1. Having less tempting high calorie (fattening) food around the house or office. (For people with strong cravings this is very important.)   2. Staying away from situations or people that may trigger your cravings .   3. Eating out only one time or less each week.  4. Eating your meals at a table with the TV or computer off.    Side-effects. Naltrexone is generally well tolerated. The main side-effect we see is  nausea or a woozy feeling. A small number of people feel quite ill. Most people have a mild reaction and some people have no reaction at all.  The good news is that this feeling does go away.     In order to avoid nausea, please start the medication with half a pill for the first few days. Go on to a full pill if you are feeling well.      If you  are nauseated on 1/2 a pill it is okay to cut back to 1/4 pill ( a very small amount). Take this for a couple of days and work your way back up to a 1/2 pill and then a whole pill. Taking the medication at night or with food  to start also may help prevent the feeling of nausea.         Please refer to the pharmacy insert for more information on side-effects. Since many pharmacists are not familiar with the use of naltrexone in weight loss, calling the nurse at [phone] will get you the most accurate information.  In order to get refills of this or any medication we prescribe you must be seen in the medical weight mgmt clinic every 2-3 months. Please have your pharmacy fax a refill request to [phone].

## 2017-08-03 NOTE — MR AVS SNAPSHOT
"              After Visit Summary   8/3/2017    Alla Shrestha    MRN: [id number]           Patient Information     Date Of Birth          1970        Visit Information        Provider Department      8/3/2017 3:15 PM Maggie Natarajan PA-C [facility] Medical Weight Management        Today's Diagnoses     Morbid obesity, unspecified obesity type (H)    -  1      Care Instructions    MEDICATION DISCUSSED AT THIS APPOINTMENT  We are considering starting Naltrexone. Start with 1/2 tab 1-2 hours prior to the time you have the most trouble with cravings or extra hunger. If you are doing well you may switch to a whole tablet taken at the same time period.     WARNING: This medication blocks the action of opioid type pain medications. If you routinely take any medication like Codeine, Oxycontin,Percocet,Morphine,Dilaudid or Methodone, do not take this until you have talked with weight management staff. If you are planning surgery you should stop Naltrexone 4 days prior to the surgery. If you have an injury that requires pain medication, make sure the health care staff knows you take Naltrexone.     Call the nurse at [phone] if you have any questions or concerns. (Do not stop taking it if you don't think it's working. For some people it works without them knowing it.)    Naltrexone is a medication that is used most often to help people who are troubled by dependence on prescription pain killers or alcohol. It has also been found to help with weight loss. Although it's not currently FDA approved for weight loss, it has been used safely for a number of years to help people who are carrying extra weight.     Just how Naltrexone helps with weight loss has not been exactly determined.  It seems to work by quieting down brain signals related to strong food cravings. Many of our patients use the word \"addiction\" to describe their feelings and constant thoughts about food. It makes sense then to treat the feeling " of dependence on food, outside of real hunger, with a medication designed to help with other sorts of dependence.     Our patients on Naltrexone find that they:    >feel less interest in food   >think less about food and eating and have more time to think of other things   >find it easier to push the plate away   >have an easier time eating less    For some of our patients, these feelings are very immediate. Other patients, don't feel much of a change but find they've lost weight. Like all weight loss medications, Naltrexone works best when you help it work. This means:  1. Having less tempting high calorie (fattening) food around the house or office. (For people with strong cravings this is very important.)   2. Staying away from situations or people that may trigger your cravings .   3. Eating out only one time or less each week.  4. Eating your meals at a table with the TV or computer off.    Side-effects. Naltrexone is generally well tolerated. The main side-effect we see is  nausea or a woozy feeling. A small number of people feel quite ill. Most people have a mild reaction and some people have no reaction at all.  The good news is that this feeling does go away.     In order to avoid nausea, please start the medication with half a pill for the first few days. Go on to a full pill if you are feeling well.      If you  are nauseated on 1/2 a pill it is okay to cut back to 1/4 pill ( a very small amount). Take this for a couple of days and work your way back up to a 1/2 pill and then a whole pill. Taking the medication at night or with food  to start also may help prevent the feeling of nausea.         Please refer to the pharmacy insert for more information on side-effects. Since many pharmacists are not familiar with the use of naltrexone in weight loss, calling the nurse at [phone] will get you the most accurate information.  In order to get refills of this or any medication we prescribe you must be seen  in the medical weight mgmt clinic every 2-3 months. Please have your pharmacy fax a refill request to [phone].                  Follow-ups after your visit        Your next 10 appointments already scheduled     Sep 26, 2017  4:30 PM CDT   (Arrive by 4:15 PM)   Return Visit with Sunshine Turner MD   [facility] Medical [facility] Management ([facility])    [address]  [address] [place] [facility] [address] [phone]              Future tests that were ordered for you today     Open Future Orders        Priority Expected Expires Ordered    AST Routine  2017 8/3/2017    ALT Routine  2017 8/3/2017            Who to contact     Please call your clinic at [phone] to:    Ask questions about your health    Make or cancel appointments    Discuss your medicines    Learn about your test results    Speak to your doctor   If you have compliments or concerns about an experience at your clinic, or if you wish to file a complaint, please contact [facility] Patient Relations at [phone] or email us at [email or web address]         Additional Information About Your Visit        ApplauseharnPario Information     [employer] is an electronic gateway that provides easy, online access to your medical records. With [employer], you can request a clinic appointment, read your test results, renew a prescription or communicate with your care team.     To sign up for [employer] visit the website at [email or web address]   You will be asked to enter the access code listed below, as well as some personal information. Please follow the directions to create your username and password.     Your access code is: [email or web address]  Expires: 2017  6:30 AM     Your access code will  in 90 days. If you need help or a new code, please contact your [facility] Clinic or call [phone] for assistance.        Care EveryWhere ID     This is your  "Care EveryWhere ID. This could be used by other organizations to access your [place] medical records  [id number]        Your Vitals Were     Pulse Height Pulse Oximetry BMI (Body Mass Index)          83 5' 7.5\" 98% 41.93 kg/m2         Blood Pressure from Last 3 Encounters:   08/03/17 144/88   05/23/17 148/89    Weight from Last 3 Encounters:   08/03/17 271 lb 11.2 oz   05/23/17 289 lb 6.4 oz               Primary Care Provider    None Specified       No primary provider on file.        Equal Access to Services     [facility]: Hadii ron higgins Sopascual, waaxda luqadaha, qaybta kaalmada daniel, carlos arevalo . So [facility] [phone].    ATENCIÓN: Si habla español, tiene a dudley disposición servicios gratuitos de asistencia lingüística. Llame al [phone].    We comply with applicable federal civil rights laws and [place] laws. We do not discriminate on the basis of race, color, national origin, age, disability sex, sexual orientation or gender identity.            Thank you!     Thank you for choosing [facility] MEDICAL WEIGHT MANAGEMENT  for your care. Our goal is always to provide you with excellent care. Hearing back from our patients is one way we can continue to improve our services. Please take a few minutes to complete the written survey that you may receive in the mail after your visit with us. Thank you!             Your Updated Medication List - Protect others around you: Learn how to safely use, store and throw away your medicines at www.disposemymeds.org.          This list is accurate as of: 8/3/17  3:36 PM.  Always use your most recent med list.                   Brand Name Dispense Instructions for use Diagnosis    calcitRIOL 0.25 MCG capsule    ROCALTROL     Take 0.25 mcg by mouth daily.        lisinopril 40 MG tablet    PRINIVIL/ZESTRIL    30 tablet    Take 1 tablet (40 mg) by mouth daily        metoprolol 25 MG tablet    LOPRESSOR     Take 25 mg by mouth 2 times " daily.        simvastatin 20 MG tablet    ZOCOR     Take 1 tablet by mouth daily.        sodium bicarbonate 650 MG tablet      Take 325 mg by mouth 3 times daily.        traZODone 50 MG tablet    DESYREL     Take 1 tablet by mouth At Bedtime.

## 2017-08-04 RX ORDER — NALTREXONE HYDROCHLORIDE 50 MG/1
TABLET, FILM COATED ORAL
Qty: 15 TABLET | Refills: 1 | Status: SHIPPED | OUTPATIENT
Start: 2017-08-04 | End: 2017-10-27

## 2017-08-07 ENCOUNTER — TELEPHONE (OUTPATIENT)
Dept: ENDOCRINOLOGY | Facility: CLINIC | Age: 47
End: 2017-08-07

## 2017-08-07 NOTE — TELEPHONE ENCOUNTER
Notified patient of new RX for Naltrexone. Medication order sent to Pharmacy.  ....................................

## 2017-08-07 NOTE — TELEPHONE ENCOUNTER
----- Message from Maggie Natarajan PA-C sent at 8/4/2017  7:02 AM CDT -----  Regarding: FW: naltrexone?  Hi Tameka,     Can you please call this pt and tell her that Dr Gonsalez is ok with her taking 1/2 tab naltrexone.  I will send prescription to her pharmacy right now.    Thanks!    ----- Message -----     From: Sunshine Turner MD     Sent: 8/3/2017   5:07 PM       To: Maggie Natarajan PA-C  Subject: RE: naltrexone?                                  Xiang Serrano,    I think we could try 1/2 pill of naltrexone.    Thanks for seeing her.  Sunshine    ----- Message -----     From: Maggie Natarajan PA-C     Sent: 8/3/2017   3:38 PM       To: Sunshine Turner MD  Subject: naltrexone?                                      Hi Dr Gonsalez,     I saw Crystal today.  She saw you in May and didn't start meds due to CKD (stage 4 no dialysis).     She has lost 18 lbs and made a lot of changes.  She has a lot of cravings still.      Would I be able to start naltrexone?    Thanks Maggie

## 2017-08-09 ENCOUNTER — RECORDS - HEALTHEAST (OUTPATIENT)
Dept: ADMINISTRATIVE | Facility: OTHER | Age: 47
End: 2017-08-09

## 2017-08-17 ENCOUNTER — RECORDS - HEALTHEAST (OUTPATIENT)
Dept: ADMINISTRATIVE | Facility: OTHER | Age: 47
End: 2017-08-17

## 2017-08-23 ENCOUNTER — RECORDS - HEALTHEAST (OUTPATIENT)
Dept: ADMINISTRATIVE | Facility: OTHER | Age: 47
End: 2017-08-23

## 2017-09-12 ENCOUNTER — RECORDS - HEALTHEAST (OUTPATIENT)
Dept: ADMINISTRATIVE | Facility: OTHER | Age: 47
End: 2017-09-12

## 2017-09-26 ENCOUNTER — OFFICE VISIT (OUTPATIENT)
Dept: ENDOCRINOLOGY | Facility: CLINIC | Age: 47
End: 2017-09-26

## 2017-09-26 VITALS
HEART RATE: 94 BPM | DIASTOLIC BLOOD PRESSURE: 107 MMHG | SYSTOLIC BLOOD PRESSURE: 177 MMHG | OXYGEN SATURATION: 100 % | WEIGHT: 264.1 LBS | BODY MASS INDEX: 41.45 KG/M2 | HEIGHT: 67 IN

## 2017-09-26 DIAGNOSIS — E66.01 MORBID OBESITY DUE TO EXCESS CALORIES (H): Primary | ICD-10-CM

## 2017-09-26 ASSESSMENT — ENCOUNTER SYMPTOMS
BRUISES/BLEEDS EASILY: 0
POLYDIPSIA: 0
CONSTIPATION: 0
HEMATURIA: 0
WHEEZING: 0
LOSS OF CONSCIOUSNESS: 0
TASTE DISTURBANCE: 0
MUSCLE CRAMPS: 0
FLANK PAIN: 0
DECREASED APPETITE: 0
COUGH: 0
ORTHOPNEA: 0
SNORES LOUDLY: 0
FEVER: 0
NECK MASS: 0
ALTERED TEMPERATURE REGULATION: 0
NUMBNESS: 0
HEMOPTYSIS: 0
HOT FLASHES: 0
MEMORY LOSS: 0
BOWEL INCONTINENCE: 0
DOUBLE VISION: 0
HEARTBURN: 0
EYE PAIN: 0
EYE REDNESS: 0
RECTAL BLEEDING: 0
MUSCLE WEAKNESS: 0
BLOATING: 0
NAIL CHANGES: 0
PANIC: 0
SEIZURES: 0
WEIGHT GAIN: 0
CHILLS: 0
ABDOMINAL PAIN: 0
LEG SWELLING: 0
BREAST PAIN: 0
CLAUDICATION: 0
JOINT SWELLING: 0
NIGHT SWEATS: 0
BACK PAIN: 0
DYSURIA: 0
HOARSE VOICE: 0
SPUTUM PRODUCTION: 0
DECREASED LIBIDO: 0
MYALGIAS: 0
DISTURBANCES IN COORDINATION: 0
SPEECH CHANGE: 0
DIARRHEA: 0
DEPRESSION: 0
PARALYSIS: 0
POOR WOUND HEALING: 0
COUGH DISTURBING SLEEP: 0
HALLUCINATIONS: 0
VOMITING: 0
SINUS CONGESTION: 0
TACHYCARDIA: 0
SORE THROAT: 0
EYE IRRITATION: 0
DIZZINESS: 0
INCREASED ENERGY: 0
RESPIRATORY PAIN: 0
TINGLING: 0
SLEEP DISTURBANCES DUE TO BREATHING: 0
FATIGUE: 0
SWOLLEN GLANDS: 0
TREMORS: 0
EXERCISE INTOLERANCE: 0
SINUS PAIN: 0
DIFFICULTY URINATING: 0
POLYPHAGIA: 0
DYSPNEA ON EXERTION: 0
HEADACHES: 0
JAUNDICE: 0
DECREASED CONCENTRATION: 0
RECTAL PAIN: 0
EXTREMITY NUMBNESS: 0
STIFFNESS: 0
SHORTNESS OF BREATH: 0
LIGHT-HEADEDNESS: 0
HYPERTENSION: 0
NAUSEA: 0
HYPOTENSION: 0
POSTURAL DYSPNEA: 0
WEAKNESS: 0
PALPITATIONS: 0
TROUBLE SWALLOWING: 0
SMELL DISTURBANCE: 0
BLOOD IN STOOL: 0
SKIN CHANGES: 0
WEIGHT LOSS: 0
LEG PAIN: 0
NERVOUS/ANXIOUS: 0
EYE WATERING: 0
NECK PAIN: 0
ARTHRALGIAS: 0
BREAST MASS: 0
SYNCOPE: 0
INSOMNIA: 0

## 2017-09-26 ASSESSMENT — PAIN SCALES - GENERAL: PAINLEVEL: NO PAIN (0)

## 2017-09-26 NOTE — PROGRESS NOTES
"    Return Medical Weight Management Note     Alla Shrestha  MRN:  [id number]  :  1970  RHONDA:  2017    Dear PCP    I had the pleasure of seeing your patient Alla Shrestha. 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    INTERVAL HISTORY:  She has lost 25 lbs since May 2017. She saw Maggie in July and was started on naltrexone 25 mg daily. She said that medication helped curbing some of appetite. She has cut out ginger ale, rice, bread and fried food. She is eating more vegetables. She is afraid that she will gain weight from snack. She is trying to do yogurt or wheat thin for snack.     She will have knee replacement in 2017.     CURRENT WEIGHT:   264 lbs 1.6 oz    Wt Readings from Last 4 Encounters:   17 119.8 kg (264 lb 1.6 oz)   17 123.2 kg (271 lb 11.2 oz)   17 131.3 kg (289 lb 6.4 oz)       Height:  5' 7\"  Body Mass Index:  Body mass index is 41.36 kg/(m^2).  Vitals:  BP (!) 177/107  Pulse 94  Ht 1.702 m (5' 7\")  Wt 119.8 kg (264 lb 1.6 oz)  SpO2 100%  BMI 41.36 kg/m2      Initial consult weight was 289 on MWM consult Dr Gonsalez.  Weight change since last seen on 2017 is down 7 pounds.   Total loss is 25 pounds.    Review of Systems     Constitutional:  Negative for fever, chills, weight loss, weight gain, fatigue, decreased appetite, night sweats, recent stressors, height gain, height loss, post-operative complications, incisional pain, hallucinations, increased energy, hyperactivity and confused.   HENT:  Negative for ear pain, hearing loss, tinnitus, nosebleeds, trouble swallowing, hoarse voice, mouth sores, sore throat, ear discharge, tooth pain, gum tenderness, taste disturbance, smell disturbance, hearing aid, bleeding gums, dry mouth, sinus pain, " sinus congestion and neck mass.    Eyes:  Negative for double vision, pain, redness, eye pain, decreased vision, eye watering, eye bulging, eye dryness, flashing lights, spots, floaters, strabismus, tunnel vision, jaundice and eye irritation.   Respiratory:   Negative for cough, hemoptysis, sputum production, shortness of breath, wheezing, sleep disturbances due to breathing, snores loudly, respiratory pain, dyspnea on exertion, cough disturbing sleep and postural dyspnea.    Cardiovascular:  Negative for chest pain, dyspnea on exertion, palpitations, orthopnea, claudication, leg swelling, fingers/toes turn blue, hypertension, hypotension, syncope, history of heart murmur, chest pain on exertion, chest pain at rest, pacemaker, few scattered varicosities, leg pain, sleep disturbances due to breathing, tachycardia, light-headedness, exercise intolerance and edema.   Gastrointestinal:  Negative for heartburn, nausea, vomiting, abdominal pain, diarrhea, constipation, blood in stool, melena, rectal pain, bloating, hemorrhoids, bowel incontinence, jaundice, rectal bleeding, coffee ground emesis and change in stool.   Genitourinary:  Negative for bladder incontinence, dysuria, urgency, hematuria, flank pain, vaginal discharge, difficulty urinating, genital sores, dyspareunia, decreased libido, nocturia, voiding less frequently, arousal difficulty, abnormal vaginal bleeding, excessive menstruation, menstrual changes, hot flashes, vaginal dryness and postmenopausal bleeding.   Musculoskeletal:  Negative for myalgias, back pain, joint swelling, arthralgias, stiffness, muscle cramps, neck pain, bone pain, muscle weakness and fracture.   Skin:  Positive for itching and rash. Negative for nail changes, poor wound healing, hair changes, skin changes, acne, warts, poor wound healing, scarring, flaky skin, Raynaud's phenomenon, sensitivity to sunlight and skin thickening.   Neurological:  Negative for dizziness, tingling, tremors,  speech change, seizures, loss of consciousness, weakness, light-headedness, numbness, headaches, disturbances in coordination, extremity numbness, memory loss, difficulty walking and paralysis.   Endo/Heme:  Negative for anemia, swollen glands and bruises/bleeds easily.   Psychiatric/Behavioral:  Negative for depression, hallucinations, memory loss, decreased concentration, mood swings and panic attacks.    Breast:  Negative for breast discharge, breast mass, breast pain and nipple retraction.   Endocrine:  Negative for altered temperature regulation, polyphagia, polydipsia, unwanted hair growth and change in facial hair.      MEDICATIONS:   Current Outpatient Prescriptions   Medication Sig Dispense Refill     naltrexone (DEPADE;REVIA) 50 MG tablet Take 1/2 Tablet daily 15 tablet 1     lisinopril (PRINIVIL,ZESTRIL) 40 MG tablet Take 1 tablet (40 mg) by mouth daily 30 tablet      calcitRIOL (ROCALTROL) 0.25 MCG capsule Take 0.25 mcg by mouth daily.       metoprolol (LOPRESSOR) 25 MG tablet Take 25 mg by mouth 2 times daily.       simvastatin (ZOCOR) 20 MG tablet Take 1 tablet by mouth daily.       sodium bicarbonate 650 MG tablet Take 325 mg by mouth 3 times daily.       traZODone (DESYREL) 50 MG tablet Take 1 tablet by mouth At Bedtime.           ASSESSMENT/PLAN:  Alla Shrestha. 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    She is not yet on dialysis  Does not seem to have uremic symptoms    Lost 25 lbs in 4 months  Motivated to lose weight and continue with diet plan  Tolerate naltrexone 25 mg well.     PLAN:  - continue naltrexone 25 mg -- stop 1 week before knee surgery and restart after off all narcotics  - encourage more protein snack -- gave options  - continue diet plan  - encourage exercise as  tolerated    FOLLOW-UP:    - RTC 4 months      Time: 15 min spent on evaluation, management, counseling, education, & motivational interviewing with greater than 50 % of the total time was spent on counseling and coordinating care    Sincerely,    Sunshine Turner MD

## 2017-09-26 NOTE — NURSING NOTE
"(   Chief Complaint   Patient presents with     RECHECK     f/u    )    ( Weight: 119.8 kg (264 lb 1.6 oz) )  ( Height: 170.2 cm (5' 7\") )  ( BMI (Calculated): 41.45 )  (   )  (   )  (   )  (   )  (   )  (   )    ( BP: (!) 177/107 )  (   )  (   )  (   )  ( Pulse: 94 )  (   )  ( SpO2: 100 % )    (   Patient Active Problem List   Diagnosis     Anemia in chronic renal disease     Chronic kidney disease, stage IV (severe) (H)     Gout     Hyperlipidemia     Nephritis and nephropathy, with membranoproliferative glomerulonephritis     Proteinuria     Other secondary hypertension     Home accident    )  (   Current Outpatient Prescriptions   Medication Sig Dispense Refill     naltrexone (DEPADE;REVIA) 50 MG tablet Take 1/2 Tablet daily 15 tablet 1     lisinopril (PRINIVIL,ZESTRIL) 40 MG tablet Take 1 tablet (40 mg) by mouth daily 30 tablet      calcitRIOL (ROCALTROL) 0.25 MCG capsule Take 0.25 mcg by mouth daily.       metoprolol (LOPRESSOR) 25 MG tablet Take 25 mg by mouth 2 times daily.       simvastatin (ZOCOR) 20 MG tablet Take 1 tablet by mouth daily.       sodium bicarbonate 650 MG tablet Take 325 mg by mouth 3 times daily.       traZODone (DESYREL) 50 MG tablet Take 1 tablet by mouth At Bedtime.      )  ( Diabetes Eval:    )    ( Pain Eval:  No Pain (0) )    ( Wound Eval:       )    (   History   Smoking Status     Current Every Day Smoker     Types: Cigarettes   Smokeless Tobacco     Never Used    )    ( Signed By:  Darian Crawford; September 26, 2017; 4:17 PM )    "

## 2017-09-26 NOTE — LETTER
"2017       RE: Alla Shrestha  [address]  [place] [address]     Dear Colleague,    Thank you for referring your patient, Alla Shrestha, to the [facility] MEDICAL WEIGHT MANAGEMENT at [facility]. Please see a copy of my visit note below.        Return Medical Weight Management Note     Alla Shrestha  MRN:  [id number]  :  1970  RHONDA:  2017    Dear PCP    I had the pleasure of seeing your patient Alla Shrestha. 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    INTERVAL HISTORY:  She has lost 25 lbs since May 2017. She saw Maggie in July and was started on naltrexone 25 mg daily. She said that medication helped curbing some of appetite. She has cut out ginger ale, rice, bread and fried food. She is eating more vegetables. She is afraid that she will gain weight from snack. She is trying to do yogurt or wheat thin for snack.     She will have knee replacement in 2017.     CURRENT WEIGHT:   264 lbs 1.6 oz    Wt Readings from Last 4 Encounters:   17 119.8 kg (264 lb 1.6 oz)   17 123.2 kg (271 lb 11.2 oz)   17 131.3 kg (289 lb 6.4 oz)       Height:  5' 7\"  Body Mass Index:  Body mass index is 41.36 kg/(m^2).  Vitals:  BP (!) 177/107  Pulse 94  Ht 1.702 m (5' 7\")  Wt 119.8 kg (264 lb 1.6 oz)  SpO2 100%  BMI 41.36 kg/m2      Initial consult weight was 289 on [facility] consult Dr Gonsalez.  Weight change since last seen on 2017 is down 7 pounds.   Total loss is 25 pounds.    Review of Systems     Constitutional:  Negative for fever, chills, weight loss, weight gain, fatigue, decreased appetite, night sweats, recent stressors, height gain, height loss, post-operative complications, incisional pain, hallucinations, " increased energy, hyperactivity and confused.   HENT:  Negative for ear pain, hearing loss, tinnitus, nosebleeds, trouble swallowing, hoarse voice, mouth sores, sore throat, ear discharge, tooth pain, gum tenderness, taste disturbance, smell disturbance, hearing aid, bleeding gums, dry mouth, sinus pain, sinus congestion and neck mass.    Eyes:  Negative for double vision, pain, redness, eye pain, decreased vision, eye watering, eye bulging, eye dryness, flashing lights, spots, floaters, strabismus, tunnel vision, jaundice and eye irritation.   Respiratory:   Negative for cough, hemoptysis, sputum production, shortness of breath, wheezing, sleep disturbances due to breathing, snores loudly, respiratory pain, dyspnea on exertion, cough disturbing sleep and postural dyspnea.    Cardiovascular:  Negative for chest pain, dyspnea on exertion, palpitations, orthopnea, claudication, leg swelling, fingers/toes turn blue, hypertension, hypotension, syncope, history of heart murmur, chest pain on exertion, chest pain at rest, pacemaker, few scattered varicosities, leg pain, sleep disturbances due to breathing, tachycardia, light-headedness, exercise intolerance and edema.   Gastrointestinal:  Negative for heartburn, nausea, vomiting, abdominal pain, diarrhea, constipation, blood in stool, melena, rectal pain, bloating, hemorrhoids, bowel incontinence, jaundice, rectal bleeding, coffee ground emesis and change in stool.   Genitourinary:  Negative for bladder incontinence, dysuria, urgency, hematuria, flank pain, vaginal discharge, difficulty urinating, genital sores, dyspareunia, decreased libido, nocturia, voiding less frequently, arousal difficulty, abnormal vaginal bleeding, excessive menstruation, menstrual changes, hot flashes, vaginal dryness and postmenopausal bleeding.   Musculoskeletal:  Negative for myalgias, back pain, joint swelling, arthralgias, stiffness, muscle cramps, neck pain, bone pain, muscle weakness and  fracture.   Skin:  Positive for itching and rash. Negative for nail changes, poor wound healing, hair changes, skin changes, acne, warts, poor wound healing, scarring, flaky skin, Raynaud's phenomenon, sensitivity to sunlight and skin thickening.   Neurological:  Negative for dizziness, tingling, tremors, speech change, seizures, loss of consciousness, weakness, light-headedness, numbness, headaches, disturbances in coordination, extremity numbness, memory loss, difficulty walking and paralysis.   Endo/Heme:  Negative for anemia, swollen glands and bruises/bleeds easily.   Psychiatric/Behavioral:  Negative for depression, hallucinations, memory loss, decreased concentration, mood swings and panic attacks.    Breast:  Negative for breast discharge, breast mass, breast pain and nipple retraction.   Endocrine:  Negative for altered temperature regulation, polyphagia, polydipsia, unwanted hair growth and change in facial hair.      MEDICATIONS:   Current Outpatient Prescriptions   Medication Sig Dispense Refill     naltrexone (DEPADE;REVIA) 50 MG tablet Take 1/2 Tablet daily 15 tablet 1     lisinopril (PRINIVIL,ZESTRIL) 40 MG tablet Take 1 tablet (40 mg) by mouth daily 30 tablet      calcitRIOL (ROCALTROL) 0.25 MCG capsule Take 0.25 mcg by mouth daily.       metoprolol (LOPRESSOR) 25 MG tablet Take 25 mg by mouth 2 times daily.       simvastatin (ZOCOR) 20 MG tablet Take 1 tablet by mouth daily.       sodium bicarbonate 650 MG tablet Take 325 mg by mouth 3 times daily.       traZODone (DESYREL) 50 MG tablet Take 1 tablet by mouth At Bedtime.           ASSESSMENT/PLAN:  Alla Shrestha. 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    She is not yet on dialysis  Does not seem to have uremic symptoms    Lost 25  lbs in 4 months  Motivated to lose weight and continue with diet plan  Tolerate naltrexone 25 mg well.     PLAN:  - continue naltrexone 25 mg -- stop 1 week before knee surgery and restart after off all narcotics  - encourage more protein snack -- gave options  - continue diet plan  - encourage exercise as tolerated    FOLLOW-UP:    - RTC 4 months      Time: 15 min spent on evaluation, management, counseling, education, & motivational interviewing with greater than 50 % of the total time was spent on counseling and coordinating care    Sincerely,    Sunshine Turner MD

## 2017-09-26 NOTE — MR AVS SNAPSHOT
After Visit Summary   2017    Alla Shrestha    MRN: [id number]           Patient Information     Date Of Birth          1970        Visit Information        Provider Department      2017 4:30 PM Sunshine Turner MD [facility] Medical Weight Management        Today's Diagnoses     Morbid obesity due to excess calories (H)    -  1      Care Instructions    Stop Naltrexone 1 week prior to knee surgery.            Follow-ups after your visit        Your next 10 appointments already scheduled     2018  4:15 PM CST   (Arrive by 4:00 PM)   Return Visit with MD HENNY [facility] Medical Weight Management ([facility])    [address]  [address] [place] [address] [phone]              Who to contact     Please call your clinic at [phone] to:    Ask questions about your health    Make or cancel appointments    Discuss your medicines    Learn about your test results    Speak to your doctor   If you have compliments or concerns about an experience at your clinic, or if you wish to file a complaint, please contact [facility] Patient Relations at [phone] or email us at [email or web address]         Additional Information About Your Visit        MyChart Information     [employer] is an electronic gateway that provides easy, online access to your medical records. With [employer], you can request a clinic appointment, read your test results, renew a prescription or communicate with your care team.     To sign up for [employer] visit the website at [email or web address]   You will be asked to enter the access code listed below, as well as some personal information. Please follow the directions to create your username and password.     Your access code is: [email or web address]  Expires: 2017  6:30 AM     Your access code will  in 90 days. If you need help or a new code, please  "contact your [facility] or call [phone] for assistance.        Care EveryWhere ID     This is your Care EveryWhere ID. This could be used by other organizations to access your [place] medical records  [id number]        Your Vitals Were     Pulse Height Pulse Oximetry BMI (Body Mass Index)          94 1.702 m (5' 7\") 100% 41.36 kg/m2         Blood Pressure from Last 3 Encounters:   09/26/17 (!) 177/107   08/03/17 144/88   05/23/17 148/89    Weight from Last 3 Encounters:   09/26/17 119.8 kg (264 lb 1.6 oz)   08/03/17 123.2 kg (271 lb 11.2 oz)   05/23/17 131.3 kg (289 lb 6.4 oz)              Today, you had the following     No orders found for display       Primary Care Provider    None Specified       No primary provider on file.        Equal Access to Services     [facility]: Hadii ron Garcia, wabenny hobbs, joan christiansenalmavee sanchez, carlos arevalo . So [facility] [phone].    ATENCIÓN: Si habla español, tiene a dudley disposición servicios gratuitos de asistencia lingüística. Llame al [phone].    We comply with applicable federal civil rights laws and [place] laws. We do not discriminate on the basis of race, color, national origin, age, disability sex, sexual orientation or gender identity.            Thank you!     Thank you for choosing [facility] MEDICAL WEIGHT MANAGEMENT  for your care. Our goal is always to provide you with excellent care. Hearing back from our patients is one way we can continue to improve our services. Please take a few minutes to complete the written survey that you may receive in the mail after your visit with us. Thank you!             Your Updated Medication List - Protect others around you: Learn how to safely use, store and throw away your medicines at www.disposemymeds.org.          This list is accurate as of: 9/26/17  4:56 PM.  Always use your most recent med list.                   Brand Name " Dispense Instructions for use Diagnosis    calcitRIOL 0.25 MCG capsule    ROCALTROL     Take 0.25 mcg by mouth daily.        lisinopril 40 MG tablet    PRINIVIL/ZESTRIL    30 tablet    Take 1 tablet (40 mg) by mouth daily        metoprolol 25 MG tablet    LOPRESSOR     Take 25 mg by mouth 2 times daily.        naltrexone 50 MG tablet    DEPADE;REVIA    15 tablet    Take 1/2 Tablet daily    Morbid obesity, unspecified obesity type (H)       simvastatin 20 MG tablet    ZOCOR     Take 1 tablet by mouth daily.        sodium bicarbonate 650 MG tablet      Take 325 mg by mouth 3 times daily.        traZODone 50 MG tablet    DESYREL     Take 1 tablet by mouth At Bedtime.

## 2017-10-06 ENCOUNTER — RECORDS - HEALTHEAST (OUTPATIENT)
Dept: ADMINISTRATIVE | Facility: OTHER | Age: 47
End: 2017-10-06

## 2017-10-18 ENCOUNTER — COMMUNICATION - HEALTHEAST (OUTPATIENT)
Dept: INTERNAL MEDICINE | Facility: CLINIC | Age: 47
End: 2017-10-18

## 2017-10-23 ENCOUNTER — OFFICE VISIT - HEALTHEAST (OUTPATIENT)
Dept: INTERNAL MEDICINE | Facility: CLINIC | Age: 47
End: 2017-10-23

## 2017-10-23 DIAGNOSIS — Z01.818 PRE-OP EXAM: ICD-10-CM

## 2017-10-23 DIAGNOSIS — I10 HTN (HYPERTENSION): ICD-10-CM

## 2017-10-23 DIAGNOSIS — N18.4 CHRONIC RENAL IMPAIRMENT, STAGE 4 (SEVERE) (H): ICD-10-CM

## 2017-10-23 DIAGNOSIS — J45.20 MILD INTERMITTENT ASTHMA WITHOUT COMPLICATION: ICD-10-CM

## 2017-10-24 LAB
ATRIAL RATE - MUSE: 75 BPM
DIASTOLIC BLOOD PRESSURE - MUSE: NORMAL MMHG
INTERPRETATION ECG - MUSE: NORMAL
P AXIS - MUSE: 60 DEGREES
PR INTERVAL - MUSE: 146 MS
QRS DURATION - MUSE: 80 MS
QT - MUSE: 404 MS
QTC - MUSE: 451 MS
R AXIS - MUSE: 25 DEGREES
SYSTOLIC BLOOD PRESSURE - MUSE: NORMAL MMHG
T AXIS - MUSE: 42 DEGREES
VENTRICULAR RATE- MUSE: 75 BPM

## 2017-10-25 ENCOUNTER — COMMUNICATION - HEALTHEAST (OUTPATIENT)
Dept: INTERNAL MEDICINE | Facility: CLINIC | Age: 47
End: 2017-10-25

## 2017-10-27 ENCOUNTER — RECORDS - HEALTHEAST (OUTPATIENT)
Dept: ADMINISTRATIVE | Facility: OTHER | Age: 47
End: 2017-10-27

## 2017-10-27 DIAGNOSIS — E66.01 MORBID OBESITY, UNSPECIFIED OBESITY TYPE (H): ICD-10-CM

## 2017-10-29 RX ORDER — NALTREXONE HYDROCHLORIDE 50 MG/1
TABLET, FILM COATED ORAL
Qty: 15 TABLET | Refills: 2 | Status: SHIPPED | OUTPATIENT
Start: 2017-10-29 | End: 2018-10-09

## 2017-10-30 NOTE — TELEPHONE ENCOUNTER
naltrexone       Last Written Prescription Date:  8/4/17  Last Fill Quantity: 15,   # refills: 1  Last Office Visit : 9/26/17  Future Office visit:  1/30/18

## 2017-11-01 ENCOUNTER — COMMUNICATION - HEALTHEAST (OUTPATIENT)
Dept: INTERNAL MEDICINE | Facility: CLINIC | Age: 47
End: 2017-11-01

## 2017-11-08 ENCOUNTER — COMMUNICATION - HEALTHEAST (OUTPATIENT)
Dept: INTERNAL MEDICINE | Facility: CLINIC | Age: 47
End: 2017-11-08

## 2017-11-10 ENCOUNTER — RECORDS - HEALTHEAST (OUTPATIENT)
Dept: ADMINISTRATIVE | Facility: OTHER | Age: 47
End: 2017-11-10

## 2017-11-15 ENCOUNTER — TRANSFERRED RECORDS (OUTPATIENT)
Dept: HEALTH INFORMATION MANAGEMENT | Facility: CLINIC | Age: 47
End: 2017-11-15

## 2017-11-15 ENCOUNTER — SURGERY - HEALTHEAST (OUTPATIENT)
Dept: SURGERY | Facility: CLINIC | Age: 47
End: 2017-11-15

## 2017-11-15 ENCOUNTER — ANESTHESIA - HEALTHEAST (OUTPATIENT)
Dept: SURGERY | Facility: CLINIC | Age: 47
End: 2017-11-15

## 2017-11-15 ASSESSMENT — MIFFLIN-ST. JEOR: SCORE: 1823.36

## 2017-11-20 ENCOUNTER — COMMUNICATION - HEALTHEAST (OUTPATIENT)
Dept: INTERNAL MEDICINE | Facility: CLINIC | Age: 47
End: 2017-11-20

## 2017-11-20 ENCOUNTER — OFFICE VISIT - HEALTHEAST (OUTPATIENT)
Dept: INTERNAL MEDICINE | Facility: CLINIC | Age: 47
End: 2017-11-20

## 2017-11-20 DIAGNOSIS — N18.4 CHRONIC RENAL IMPAIRMENT, STAGE 4 (SEVERE) (H): ICD-10-CM

## 2017-11-20 DIAGNOSIS — Z96.652 KNEE JOINT REPLACEMENT STATUS, LEFT: ICD-10-CM

## 2017-11-20 DIAGNOSIS — K59.00 CONSTIPATION: ICD-10-CM

## 2017-11-20 DIAGNOSIS — F17.200 SMOKING: ICD-10-CM

## 2017-11-20 DIAGNOSIS — D64.9 ANEMIA: ICD-10-CM

## 2017-11-20 ASSESSMENT — MIFFLIN-ST. JEOR: SCORE: 1910.23

## 2017-11-21 ENCOUNTER — COMMUNICATION - HEALTHEAST (OUTPATIENT)
Dept: INTERNAL MEDICINE | Facility: CLINIC | Age: 47
End: 2017-11-21

## 2017-11-22 ENCOUNTER — OFFICE VISIT - HEALTHEAST (OUTPATIENT)
Dept: PHYSICAL THERAPY | Facility: REHABILITATION | Age: 47
End: 2017-11-22

## 2017-11-22 DIAGNOSIS — Z96.652 HISTORY OF TOTAL LEFT KNEE REPLACEMENT: ICD-10-CM

## 2017-11-22 DIAGNOSIS — J45.20 MILD INTERMITTENT ASTHMA IN ADULT WITHOUT COMPLICATION: ICD-10-CM

## 2017-11-22 DIAGNOSIS — I10 ESSENTIAL HYPERTENSION: ICD-10-CM

## 2017-11-22 DIAGNOSIS — M25.662 KNEE STIFFNESS, LEFT: ICD-10-CM

## 2017-11-22 DIAGNOSIS — N18.4 CKD (CHRONIC KIDNEY DISEASE) STAGE 4, GFR 15-29 ML/MIN (H): ICD-10-CM

## 2017-11-22 DIAGNOSIS — E78.2 MIXED HYPERLIPIDEMIA: ICD-10-CM

## 2017-11-22 DIAGNOSIS — M25.562 LEFT KNEE PAIN: ICD-10-CM

## 2017-11-22 DIAGNOSIS — M17.9 KNEE OSTEOARTHRITIS: ICD-10-CM

## 2017-11-24 ENCOUNTER — COMMUNICATION - HEALTHEAST (OUTPATIENT)
Dept: INTERNAL MEDICINE | Facility: CLINIC | Age: 47
End: 2017-11-24

## 2017-11-24 ENCOUNTER — AMBULATORY - HEALTHEAST (OUTPATIENT)
Dept: LAB | Facility: CLINIC | Age: 47
End: 2017-11-24

## 2017-11-24 DIAGNOSIS — Z96.652 KNEE JOINT REPLACEMENT STATUS, LEFT: ICD-10-CM

## 2017-11-27 ENCOUNTER — AMBULATORY - HEALTHEAST (OUTPATIENT)
Dept: LAB | Facility: CLINIC | Age: 47
End: 2017-11-27

## 2017-11-27 ENCOUNTER — OFFICE VISIT - HEALTHEAST (OUTPATIENT)
Dept: PHYSICAL THERAPY | Facility: REHABILITATION | Age: 47
End: 2017-11-27

## 2017-11-27 ENCOUNTER — COMMUNICATION - HEALTHEAST (OUTPATIENT)
Dept: INTERNAL MEDICINE | Facility: CLINIC | Age: 47
End: 2017-11-27

## 2017-11-27 DIAGNOSIS — I10 ESSENTIAL HYPERTENSION: ICD-10-CM

## 2017-11-27 DIAGNOSIS — M25.562 LEFT KNEE PAIN, UNSPECIFIED CHRONICITY: ICD-10-CM

## 2017-11-27 DIAGNOSIS — Z96.652 KNEE JOINT REPLACEMENT STATUS, LEFT: ICD-10-CM

## 2017-11-27 DIAGNOSIS — M17.9 KNEE OSTEOARTHRITIS: ICD-10-CM

## 2017-11-27 DIAGNOSIS — M25.662 KNEE STIFFNESS, LEFT: ICD-10-CM

## 2017-11-27 DIAGNOSIS — J45.20 MILD INTERMITTENT ASTHMA IN ADULT WITHOUT COMPLICATION: ICD-10-CM

## 2017-11-27 DIAGNOSIS — Z96.652 HISTORY OF TOTAL LEFT KNEE REPLACEMENT: ICD-10-CM

## 2017-11-27 DIAGNOSIS — N18.4 CKD (CHRONIC KIDNEY DISEASE) STAGE 4, GFR 15-29 ML/MIN (H): ICD-10-CM

## 2017-11-27 DIAGNOSIS — E78.2 MIXED HYPERLIPIDEMIA: ICD-10-CM

## 2017-11-29 ENCOUNTER — OFFICE VISIT - HEALTHEAST (OUTPATIENT)
Dept: PHYSICAL THERAPY | Facility: REHABILITATION | Age: 47
End: 2017-11-29

## 2017-11-29 DIAGNOSIS — M25.662 KNEE STIFFNESS, LEFT: ICD-10-CM

## 2017-11-29 DIAGNOSIS — M25.562 LEFT KNEE PAIN, UNSPECIFIED CHRONICITY: ICD-10-CM

## 2017-11-29 DIAGNOSIS — Z96.652 HISTORY OF TOTAL LEFT KNEE REPLACEMENT: ICD-10-CM

## 2017-11-29 DIAGNOSIS — M17.12 PRIMARY OSTEOARTHRITIS OF LEFT KNEE: ICD-10-CM

## 2017-12-04 ENCOUNTER — OFFICE VISIT - HEALTHEAST (OUTPATIENT)
Dept: PHYSICAL THERAPY | Facility: REHABILITATION | Age: 47
End: 2017-12-04

## 2017-12-04 DIAGNOSIS — M17.12 PRIMARY OSTEOARTHRITIS OF LEFT KNEE: ICD-10-CM

## 2017-12-04 DIAGNOSIS — J45.20 MILD INTERMITTENT ASTHMA IN ADULT WITHOUT COMPLICATION: ICD-10-CM

## 2017-12-04 DIAGNOSIS — I10 ESSENTIAL HYPERTENSION: ICD-10-CM

## 2017-12-04 DIAGNOSIS — M25.662 KNEE STIFFNESS, LEFT: ICD-10-CM

## 2017-12-04 DIAGNOSIS — N18.4 CKD (CHRONIC KIDNEY DISEASE) STAGE 4, GFR 15-29 ML/MIN (H): ICD-10-CM

## 2017-12-04 DIAGNOSIS — E78.2 MIXED HYPERLIPIDEMIA: ICD-10-CM

## 2017-12-04 DIAGNOSIS — M25.562 LEFT KNEE PAIN, UNSPECIFIED CHRONICITY: ICD-10-CM

## 2017-12-04 DIAGNOSIS — M17.9 KNEE OSTEOARTHRITIS: ICD-10-CM

## 2017-12-04 DIAGNOSIS — Z96.652 HISTORY OF TOTAL LEFT KNEE REPLACEMENT: ICD-10-CM

## 2017-12-05 ENCOUNTER — RECORDS - HEALTHEAST (OUTPATIENT)
Dept: ADMINISTRATIVE | Facility: OTHER | Age: 47
End: 2017-12-05

## 2017-12-06 ENCOUNTER — OFFICE VISIT - HEALTHEAST (OUTPATIENT)
Dept: PHYSICAL THERAPY | Facility: REHABILITATION | Age: 47
End: 2017-12-06

## 2017-12-06 ENCOUNTER — COMMUNICATION - HEALTHEAST (OUTPATIENT)
Dept: INTERNAL MEDICINE | Facility: CLINIC | Age: 47
End: 2017-12-06

## 2017-12-06 DIAGNOSIS — I10 ESSENTIAL HYPERTENSION: ICD-10-CM

## 2017-12-06 DIAGNOSIS — M25.662 KNEE STIFFNESS, LEFT: ICD-10-CM

## 2017-12-06 DIAGNOSIS — J45.20 MILD INTERMITTENT ASTHMA IN ADULT WITHOUT COMPLICATION: ICD-10-CM

## 2017-12-06 DIAGNOSIS — E78.2 MIXED HYPERLIPIDEMIA: ICD-10-CM

## 2017-12-06 DIAGNOSIS — Z96.652 HISTORY OF TOTAL LEFT KNEE REPLACEMENT: ICD-10-CM

## 2017-12-06 DIAGNOSIS — M25.562 LEFT KNEE PAIN, UNSPECIFIED CHRONICITY: ICD-10-CM

## 2017-12-06 DIAGNOSIS — M17.12 PRIMARY OSTEOARTHRITIS OF LEFT KNEE: ICD-10-CM

## 2017-12-11 ENCOUNTER — OFFICE VISIT - HEALTHEAST (OUTPATIENT)
Dept: PHYSICAL THERAPY | Facility: REHABILITATION | Age: 47
End: 2017-12-11

## 2017-12-11 DIAGNOSIS — M25.562 LEFT KNEE PAIN, UNSPECIFIED CHRONICITY: ICD-10-CM

## 2017-12-11 DIAGNOSIS — J45.20 MILD INTERMITTENT ASTHMA IN ADULT WITHOUT COMPLICATION: ICD-10-CM

## 2017-12-11 DIAGNOSIS — M25.662 KNEE STIFFNESS, LEFT: ICD-10-CM

## 2017-12-11 DIAGNOSIS — E78.2 MIXED HYPERLIPIDEMIA: ICD-10-CM

## 2017-12-11 DIAGNOSIS — M17.12 PRIMARY OSTEOARTHRITIS OF LEFT KNEE: ICD-10-CM

## 2017-12-11 DIAGNOSIS — N18.4 CKD (CHRONIC KIDNEY DISEASE) STAGE 4, GFR 15-29 ML/MIN (H): ICD-10-CM

## 2017-12-11 DIAGNOSIS — Z96.652 HISTORY OF TOTAL LEFT KNEE REPLACEMENT: ICD-10-CM

## 2017-12-11 DIAGNOSIS — I10 ESSENTIAL HYPERTENSION: ICD-10-CM

## 2017-12-13 ENCOUNTER — OFFICE VISIT - HEALTHEAST (OUTPATIENT)
Dept: PHYSICAL THERAPY | Facility: REHABILITATION | Age: 47
End: 2017-12-13

## 2017-12-13 ENCOUNTER — COMMUNICATION - HEALTHEAST (OUTPATIENT)
Dept: INTERNAL MEDICINE | Facility: CLINIC | Age: 47
End: 2017-12-13

## 2017-12-13 ENCOUNTER — AMBULATORY - HEALTHEAST (OUTPATIENT)
Dept: LAB | Facility: CLINIC | Age: 47
End: 2017-12-13

## 2017-12-13 DIAGNOSIS — Z96.652 HISTORY OF TOTAL LEFT KNEE REPLACEMENT: ICD-10-CM

## 2017-12-13 DIAGNOSIS — J45.20 MILD INTERMITTENT ASTHMA IN ADULT WITHOUT COMPLICATION: ICD-10-CM

## 2017-12-13 DIAGNOSIS — M17.12 PRIMARY OSTEOARTHRITIS OF LEFT KNEE: ICD-10-CM

## 2017-12-13 DIAGNOSIS — M25.662 KNEE STIFFNESS, LEFT: ICD-10-CM

## 2017-12-13 DIAGNOSIS — E78.2 MIXED HYPERLIPIDEMIA: ICD-10-CM

## 2017-12-13 DIAGNOSIS — Z96.652 KNEE JOINT REPLACEMENT STATUS, LEFT: ICD-10-CM

## 2017-12-13 DIAGNOSIS — I10 ESSENTIAL HYPERTENSION: ICD-10-CM

## 2017-12-13 DIAGNOSIS — M25.562 LEFT KNEE PAIN, UNSPECIFIED CHRONICITY: ICD-10-CM

## 2017-12-14 ENCOUNTER — RECORDS - HEALTHEAST (OUTPATIENT)
Dept: ADMINISTRATIVE | Facility: OTHER | Age: 47
End: 2017-12-14

## 2017-12-15 ENCOUNTER — OFFICE VISIT - HEALTHEAST (OUTPATIENT)
Dept: PHYSICAL THERAPY | Facility: REHABILITATION | Age: 47
End: 2017-12-15

## 2017-12-15 DIAGNOSIS — I10 ESSENTIAL HYPERTENSION: ICD-10-CM

## 2017-12-15 DIAGNOSIS — M25.562 LEFT KNEE PAIN, UNSPECIFIED CHRONICITY: ICD-10-CM

## 2017-12-15 DIAGNOSIS — E78.2 MIXED HYPERLIPIDEMIA: ICD-10-CM

## 2017-12-15 DIAGNOSIS — Z96.652 HISTORY OF TOTAL LEFT KNEE REPLACEMENT: ICD-10-CM

## 2017-12-15 DIAGNOSIS — M25.662 KNEE STIFFNESS, LEFT: ICD-10-CM

## 2017-12-15 DIAGNOSIS — J45.20 MILD INTERMITTENT ASTHMA IN ADULT WITHOUT COMPLICATION: ICD-10-CM

## 2017-12-15 DIAGNOSIS — M17.12 PRIMARY OSTEOARTHRITIS OF LEFT KNEE: ICD-10-CM

## 2017-12-15 DIAGNOSIS — N18.4 CKD (CHRONIC KIDNEY DISEASE) STAGE 4, GFR 15-29 ML/MIN (H): ICD-10-CM

## 2017-12-19 ENCOUNTER — RECORDS - HEALTHEAST (OUTPATIENT)
Dept: ADMINISTRATIVE | Facility: OTHER | Age: 47
End: 2017-12-19

## 2017-12-19 ASSESSMENT — MIFFLIN-ST. JEOR: SCORE: 1773.01

## 2017-12-20 ENCOUNTER — HOME CARE/HOSPICE - HEALTHEAST (OUTPATIENT)
Dept: HOME HEALTH SERVICES | Facility: HOME HEALTH | Age: 47
End: 2017-12-20

## 2017-12-20 ENCOUNTER — ANESTHESIA - HEALTHEAST (OUTPATIENT)
Dept: SURGERY | Facility: CLINIC | Age: 47
End: 2017-12-20

## 2017-12-20 ENCOUNTER — TRANSFERRED RECORDS (OUTPATIENT)
Dept: HEALTH INFORMATION MANAGEMENT | Facility: CLINIC | Age: 47
End: 2017-12-20

## 2017-12-20 ENCOUNTER — SURGERY - HEALTHEAST (OUTPATIENT)
Dept: SURGERY | Facility: CLINIC | Age: 47
End: 2017-12-20

## 2017-12-20 ASSESSMENT — MIFFLIN-ST. JEOR: SCORE: 1763.94

## 2017-12-25 ENCOUNTER — HOME CARE/HOSPICE - HEALTHEAST (OUTPATIENT)
Dept: HOME HEALTH SERVICES | Facility: HOME HEALTH | Age: 47
End: 2017-12-25

## 2017-12-26 ENCOUNTER — COMMUNICATION - HEALTHEAST (OUTPATIENT)
Dept: NURSING | Facility: CLINIC | Age: 47
End: 2017-12-26

## 2017-12-26 ENCOUNTER — HOME CARE/HOSPICE - HEALTHEAST (OUTPATIENT)
Dept: HOME HEALTH SERVICES | Facility: HOME HEALTH | Age: 47
End: 2017-12-26

## 2017-12-28 ENCOUNTER — COMMUNICATION - HEALTHEAST (OUTPATIENT)
Dept: INTERNAL MEDICINE | Facility: CLINIC | Age: 47
End: 2017-12-28

## 2017-12-28 ENCOUNTER — HOME CARE/HOSPICE - HEALTHEAST (OUTPATIENT)
Dept: HOME HEALTH SERVICES | Facility: HOME HEALTH | Age: 47
End: 2017-12-28

## 2017-12-28 ENCOUNTER — COMMUNICATION - HEALTHEAST (OUTPATIENT)
Dept: NURSING | Facility: CLINIC | Age: 47
End: 2017-12-28

## 2017-12-28 ENCOUNTER — OFFICE VISIT - HEALTHEAST (OUTPATIENT)
Dept: INTERNAL MEDICINE | Facility: CLINIC | Age: 47
End: 2017-12-28

## 2017-12-28 DIAGNOSIS — Z96.651 S/P TOTAL KNEE ARTHROPLASTY, RIGHT: ICD-10-CM

## 2017-12-28 DIAGNOSIS — I10 ESSENTIAL HYPERTENSION: ICD-10-CM

## 2017-12-28 DIAGNOSIS — Z96.652 KNEE JOINT REPLACEMENT STATUS, LEFT: ICD-10-CM

## 2017-12-28 DIAGNOSIS — Z79.01 WARFARIN ANTICOAGULATION: ICD-10-CM

## 2017-12-28 DIAGNOSIS — D63.1 ANEMIA, CHRONIC RENAL FAILURE, STAGE 4 (SEVERE) (H): ICD-10-CM

## 2017-12-28 DIAGNOSIS — N18.4 ANEMIA, CHRONIC RENAL FAILURE, STAGE 4 (SEVERE) (H): ICD-10-CM

## 2017-12-28 DIAGNOSIS — N18.4 CKD (CHRONIC KIDNEY DISEASE) STAGE 4, GFR 15-29 ML/MIN (H): ICD-10-CM

## 2017-12-29 ENCOUNTER — HOME CARE/HOSPICE - HEALTHEAST (OUTPATIENT)
Dept: HOME HEALTH SERVICES | Facility: HOME HEALTH | Age: 47
End: 2017-12-29

## 2018-01-02 ENCOUNTER — OFFICE VISIT - HEALTHEAST (OUTPATIENT)
Dept: PHYSICAL THERAPY | Facility: REHABILITATION | Age: 48
End: 2018-01-02

## 2018-01-02 DIAGNOSIS — M17.11 PRIMARY OSTEOARTHRITIS OF RIGHT KNEE: ICD-10-CM

## 2018-01-02 DIAGNOSIS — R26.9 ABNORMALITY OF GAIT: ICD-10-CM

## 2018-01-02 DIAGNOSIS — J45.20 MILD INTERMITTENT ASTHMA IN ADULT WITHOUT COMPLICATION: ICD-10-CM

## 2018-01-02 DIAGNOSIS — M25.461 SWELLING OF KNEE JOINT, RIGHT: ICD-10-CM

## 2018-01-02 DIAGNOSIS — M25.661 KNEE STIFFNESS, RIGHT: ICD-10-CM

## 2018-01-02 DIAGNOSIS — I10 ESSENTIAL HYPERTENSION: ICD-10-CM

## 2018-01-02 DIAGNOSIS — E78.2 MIXED HYPERLIPIDEMIA: ICD-10-CM

## 2018-01-02 DIAGNOSIS — N18.4 CKD (CHRONIC KIDNEY DISEASE) STAGE 4, GFR 15-29 ML/MIN (H): ICD-10-CM

## 2018-01-02 DIAGNOSIS — Z96.651 HISTORY OF TOTAL RIGHT KNEE REPLACEMENT: ICD-10-CM

## 2018-01-04 ENCOUNTER — RECORDS - HEALTHEAST (OUTPATIENT)
Dept: ADMINISTRATIVE | Facility: OTHER | Age: 48
End: 2018-01-04

## 2018-01-05 ENCOUNTER — OFFICE VISIT - HEALTHEAST (OUTPATIENT)
Dept: PHYSICAL THERAPY | Facility: REHABILITATION | Age: 48
End: 2018-01-05

## 2018-01-05 ENCOUNTER — COMMUNICATION - HEALTHEAST (OUTPATIENT)
Dept: INTERNAL MEDICINE | Facility: CLINIC | Age: 48
End: 2018-01-05

## 2018-01-05 ENCOUNTER — AMBULATORY - HEALTHEAST (OUTPATIENT)
Dept: LAB | Facility: CLINIC | Age: 48
End: 2018-01-05

## 2018-01-05 DIAGNOSIS — M17.11 PRIMARY OSTEOARTHRITIS OF RIGHT KNEE: ICD-10-CM

## 2018-01-05 DIAGNOSIS — R26.9 ABNORMALITY OF GAIT: ICD-10-CM

## 2018-01-05 DIAGNOSIS — Z96.651 S/P TOTAL KNEE ARTHROPLASTY, RIGHT: ICD-10-CM

## 2018-01-05 DIAGNOSIS — J45.20 MILD INTERMITTENT ASTHMA IN ADULT WITHOUT COMPLICATION: ICD-10-CM

## 2018-01-05 DIAGNOSIS — I10 ESSENTIAL HYPERTENSION: ICD-10-CM

## 2018-01-05 DIAGNOSIS — E78.2 MIXED HYPERLIPIDEMIA: ICD-10-CM

## 2018-01-05 DIAGNOSIS — N18.4 CKD (CHRONIC KIDNEY DISEASE) STAGE 4, GFR 15-29 ML/MIN (H): ICD-10-CM

## 2018-01-05 DIAGNOSIS — M25.461 SWELLING OF KNEE JOINT, RIGHT: ICD-10-CM

## 2018-01-05 DIAGNOSIS — M25.661 KNEE STIFFNESS, RIGHT: ICD-10-CM

## 2018-01-05 DIAGNOSIS — Z79.01 WARFARIN ANTICOAGULATION: ICD-10-CM

## 2018-01-05 DIAGNOSIS — Z96.651 HISTORY OF TOTAL RIGHT KNEE REPLACEMENT: ICD-10-CM

## 2018-01-05 LAB — INR PPP: 1.5 (ref 0.9–1.1)

## 2018-01-11 ENCOUNTER — RECORDS - HEALTHEAST (OUTPATIENT)
Dept: ADMINISTRATIVE | Facility: OTHER | Age: 48
End: 2018-01-11

## 2018-01-11 ENCOUNTER — OFFICE VISIT - HEALTHEAST (OUTPATIENT)
Dept: PHYSICAL THERAPY | Facility: REHABILITATION | Age: 48
End: 2018-01-11

## 2018-01-11 DIAGNOSIS — M25.461 SWELLING OF KNEE JOINT, RIGHT: ICD-10-CM

## 2018-01-11 DIAGNOSIS — M17.12 PRIMARY OSTEOARTHRITIS OF LEFT KNEE: ICD-10-CM

## 2018-01-11 DIAGNOSIS — Z96.651 HISTORY OF TOTAL RIGHT KNEE REPLACEMENT: ICD-10-CM

## 2018-01-11 DIAGNOSIS — I10 ESSENTIAL HYPERTENSION: ICD-10-CM

## 2018-01-11 DIAGNOSIS — J45.20 MILD INTERMITTENT ASTHMA IN ADULT WITHOUT COMPLICATION: ICD-10-CM

## 2018-01-11 DIAGNOSIS — E78.2 MIXED HYPERLIPIDEMIA: ICD-10-CM

## 2018-01-11 DIAGNOSIS — N18.4 CKD (CHRONIC KIDNEY DISEASE) STAGE 4, GFR 15-29 ML/MIN (H): ICD-10-CM

## 2018-01-11 DIAGNOSIS — M25.662 KNEE STIFFNESS, LEFT: ICD-10-CM

## 2018-01-11 DIAGNOSIS — M17.11 PRIMARY OSTEOARTHRITIS OF RIGHT KNEE: ICD-10-CM

## 2018-01-11 DIAGNOSIS — Z96.652 HISTORY OF TOTAL LEFT KNEE REPLACEMENT: ICD-10-CM

## 2018-01-11 DIAGNOSIS — R26.9 ABNORMALITY OF GAIT: ICD-10-CM

## 2018-01-11 DIAGNOSIS — M25.562 LEFT KNEE PAIN, UNSPECIFIED CHRONICITY: ICD-10-CM

## 2018-01-11 DIAGNOSIS — M25.661 KNEE STIFFNESS, RIGHT: ICD-10-CM

## 2018-01-16 ENCOUNTER — COMMUNICATION - HEALTHEAST (OUTPATIENT)
Dept: INTERNAL MEDICINE | Facility: CLINIC | Age: 48
End: 2018-01-16

## 2018-01-16 ENCOUNTER — OFFICE VISIT - HEALTHEAST (OUTPATIENT)
Dept: PHYSICAL THERAPY | Facility: REHABILITATION | Age: 48
End: 2018-01-16

## 2018-01-16 ENCOUNTER — AMBULATORY - HEALTHEAST (OUTPATIENT)
Dept: LAB | Facility: CLINIC | Age: 48
End: 2018-01-16

## 2018-01-16 DIAGNOSIS — Z96.652 HISTORY OF TOTAL LEFT KNEE REPLACEMENT: ICD-10-CM

## 2018-01-16 DIAGNOSIS — M25.461 SWELLING OF KNEE JOINT, RIGHT: ICD-10-CM

## 2018-01-16 DIAGNOSIS — M25.662 KNEE STIFFNESS, LEFT: ICD-10-CM

## 2018-01-16 DIAGNOSIS — N18.4 CKD (CHRONIC KIDNEY DISEASE) STAGE 4, GFR 15-29 ML/MIN (H): ICD-10-CM

## 2018-01-16 DIAGNOSIS — R26.9 ABNORMALITY OF GAIT: ICD-10-CM

## 2018-01-16 DIAGNOSIS — M17.11 PRIMARY OSTEOARTHRITIS OF RIGHT KNEE: ICD-10-CM

## 2018-01-16 DIAGNOSIS — Z96.651 S/P TOTAL KNEE ARTHROPLASTY, RIGHT: ICD-10-CM

## 2018-01-16 DIAGNOSIS — Z79.01 WARFARIN ANTICOAGULATION: ICD-10-CM

## 2018-01-16 DIAGNOSIS — M25.562 LEFT KNEE PAIN, UNSPECIFIED CHRONICITY: ICD-10-CM

## 2018-01-16 DIAGNOSIS — M17.12 PRIMARY OSTEOARTHRITIS OF LEFT KNEE: ICD-10-CM

## 2018-01-16 DIAGNOSIS — Z96.651 HISTORY OF TOTAL RIGHT KNEE REPLACEMENT: ICD-10-CM

## 2018-01-16 DIAGNOSIS — M25.661 KNEE STIFFNESS, RIGHT: ICD-10-CM

## 2018-01-16 DIAGNOSIS — E78.2 MIXED HYPERLIPIDEMIA: ICD-10-CM

## 2018-01-16 DIAGNOSIS — I10 ESSENTIAL HYPERTENSION: ICD-10-CM

## 2018-01-16 DIAGNOSIS — J45.20 MILD INTERMITTENT ASTHMA IN ADULT WITHOUT COMPLICATION: ICD-10-CM

## 2018-01-16 LAB — INR PPP: 1.6 (ref 0.9–1.1)

## 2018-01-18 ENCOUNTER — OFFICE VISIT - HEALTHEAST (OUTPATIENT)
Dept: PHYSICAL THERAPY | Facility: REHABILITATION | Age: 48
End: 2018-01-18

## 2018-01-18 DIAGNOSIS — M25.661 KNEE STIFFNESS, RIGHT: ICD-10-CM

## 2018-01-18 DIAGNOSIS — I10 ESSENTIAL HYPERTENSION: ICD-10-CM

## 2018-01-18 DIAGNOSIS — R26.9 ABNORMALITY OF GAIT: ICD-10-CM

## 2018-01-18 DIAGNOSIS — M17.11 PRIMARY OSTEOARTHRITIS OF RIGHT KNEE: ICD-10-CM

## 2018-01-18 DIAGNOSIS — M25.461 SWELLING OF KNEE JOINT, RIGHT: ICD-10-CM

## 2018-01-18 DIAGNOSIS — Z96.651 HISTORY OF TOTAL RIGHT KNEE REPLACEMENT: ICD-10-CM

## 2018-01-18 DIAGNOSIS — M25.562 LEFT KNEE PAIN, UNSPECIFIED CHRONICITY: ICD-10-CM

## 2018-01-18 DIAGNOSIS — M25.662 KNEE STIFFNESS, LEFT: ICD-10-CM

## 2018-01-18 DIAGNOSIS — Z96.652 HISTORY OF TOTAL LEFT KNEE REPLACEMENT: ICD-10-CM

## 2018-01-30 ENCOUNTER — COMMUNICATION - HEALTHEAST (OUTPATIENT)
Dept: INTERNAL MEDICINE | Facility: CLINIC | Age: 48
End: 2018-01-30

## 2018-01-30 ENCOUNTER — AMBULATORY - HEALTHEAST (OUTPATIENT)
Dept: LAB | Facility: CLINIC | Age: 48
End: 2018-01-30

## 2018-01-30 ENCOUNTER — OFFICE VISIT - HEALTHEAST (OUTPATIENT)
Dept: PHYSICAL THERAPY | Facility: REHABILITATION | Age: 48
End: 2018-01-30

## 2018-01-30 DIAGNOSIS — M17.11 PRIMARY OSTEOARTHRITIS OF RIGHT KNEE: ICD-10-CM

## 2018-01-30 DIAGNOSIS — R26.9 ABNORMALITY OF GAIT: ICD-10-CM

## 2018-01-30 DIAGNOSIS — Z79.01 WARFARIN ANTICOAGULATION: ICD-10-CM

## 2018-01-30 DIAGNOSIS — Z96.651 S/P TOTAL KNEE ARTHROPLASTY, RIGHT: ICD-10-CM

## 2018-01-30 DIAGNOSIS — M25.661 KNEE STIFFNESS, RIGHT: ICD-10-CM

## 2018-01-30 DIAGNOSIS — Z96.651 HISTORY OF TOTAL RIGHT KNEE REPLACEMENT: ICD-10-CM

## 2018-01-30 DIAGNOSIS — M25.461 SWELLING OF KNEE JOINT, RIGHT: ICD-10-CM

## 2018-01-30 LAB — INR PPP: 2 (ref 0.9–1.1)

## 2018-02-01 ENCOUNTER — OFFICE VISIT - HEALTHEAST (OUTPATIENT)
Dept: PHYSICAL THERAPY | Facility: REHABILITATION | Age: 48
End: 2018-02-01

## 2018-02-01 DIAGNOSIS — R26.9 ABNORMALITY OF GAIT: ICD-10-CM

## 2018-02-01 DIAGNOSIS — M17.11 PRIMARY OSTEOARTHRITIS OF RIGHT KNEE: ICD-10-CM

## 2018-02-01 DIAGNOSIS — M25.661 KNEE STIFFNESS, RIGHT: ICD-10-CM

## 2018-02-01 DIAGNOSIS — M25.461 SWELLING OF KNEE JOINT, RIGHT: ICD-10-CM

## 2018-02-01 DIAGNOSIS — Z96.651 HISTORY OF TOTAL RIGHT KNEE REPLACEMENT: ICD-10-CM

## 2018-02-06 ENCOUNTER — OFFICE VISIT - HEALTHEAST (OUTPATIENT)
Dept: PHYSICAL THERAPY | Facility: REHABILITATION | Age: 48
End: 2018-02-06

## 2018-02-06 ENCOUNTER — RECORDS - HEALTHEAST (OUTPATIENT)
Dept: ADMINISTRATIVE | Facility: OTHER | Age: 48
End: 2018-02-06

## 2018-02-06 DIAGNOSIS — M17.11 PRIMARY OSTEOARTHRITIS OF RIGHT KNEE: ICD-10-CM

## 2018-02-06 DIAGNOSIS — M25.461 SWELLING OF KNEE JOINT, RIGHT: ICD-10-CM

## 2018-02-06 DIAGNOSIS — R26.9 ABNORMALITY OF GAIT: ICD-10-CM

## 2018-02-06 DIAGNOSIS — Z96.651 HISTORY OF TOTAL RIGHT KNEE REPLACEMENT: ICD-10-CM

## 2018-02-06 DIAGNOSIS — M25.661 KNEE STIFFNESS, RIGHT: ICD-10-CM

## 2018-02-08 ENCOUNTER — OFFICE VISIT - HEALTHEAST (OUTPATIENT)
Dept: PHYSICAL THERAPY | Facility: REHABILITATION | Age: 48
End: 2018-02-08

## 2018-02-08 DIAGNOSIS — M25.661 KNEE STIFFNESS, RIGHT: ICD-10-CM

## 2018-02-08 DIAGNOSIS — R26.9 ABNORMALITY OF GAIT: ICD-10-CM

## 2018-02-08 DIAGNOSIS — M25.461 SWELLING OF KNEE JOINT, RIGHT: ICD-10-CM

## 2018-02-08 DIAGNOSIS — M17.11 PRIMARY OSTEOARTHRITIS OF RIGHT KNEE: ICD-10-CM

## 2018-02-08 DIAGNOSIS — Z96.651 HISTORY OF TOTAL RIGHT KNEE REPLACEMENT: ICD-10-CM

## 2018-02-08 DIAGNOSIS — I10 ESSENTIAL HYPERTENSION: ICD-10-CM

## 2018-02-13 ENCOUNTER — OFFICE VISIT - HEALTHEAST (OUTPATIENT)
Dept: PHYSICAL THERAPY | Facility: REHABILITATION | Age: 48
End: 2018-02-13

## 2018-02-13 ENCOUNTER — COMMUNICATION - HEALTHEAST (OUTPATIENT)
Dept: INTERNAL MEDICINE | Facility: CLINIC | Age: 48
End: 2018-02-13

## 2018-02-13 DIAGNOSIS — R26.9 ABNORMALITY OF GAIT: ICD-10-CM

## 2018-02-13 DIAGNOSIS — Z96.651 HISTORY OF TOTAL RIGHT KNEE REPLACEMENT: ICD-10-CM

## 2018-02-13 DIAGNOSIS — M25.661 KNEE STIFFNESS, RIGHT: ICD-10-CM

## 2018-02-13 DIAGNOSIS — M17.11 PRIMARY OSTEOARTHRITIS OF RIGHT KNEE: ICD-10-CM

## 2018-02-13 DIAGNOSIS — M25.461 SWELLING OF KNEE JOINT, RIGHT: ICD-10-CM

## 2018-02-13 DIAGNOSIS — I10 ESSENTIAL HYPERTENSION: ICD-10-CM

## 2018-02-15 ENCOUNTER — OFFICE VISIT - HEALTHEAST (OUTPATIENT)
Dept: PHYSICAL THERAPY | Facility: REHABILITATION | Age: 48
End: 2018-02-15

## 2018-02-15 DIAGNOSIS — Z96.651 HISTORY OF TOTAL RIGHT KNEE REPLACEMENT: ICD-10-CM

## 2018-02-15 DIAGNOSIS — M25.461 SWELLING OF KNEE JOINT, RIGHT: ICD-10-CM

## 2018-02-15 DIAGNOSIS — M25.662 KNEE STIFFNESS, LEFT: ICD-10-CM

## 2018-02-15 DIAGNOSIS — M25.661 KNEE STIFFNESS, RIGHT: ICD-10-CM

## 2018-02-15 DIAGNOSIS — R26.9 ABNORMALITY OF GAIT: ICD-10-CM

## 2018-02-15 DIAGNOSIS — M17.11 PRIMARY OSTEOARTHRITIS OF RIGHT KNEE: ICD-10-CM

## 2018-02-16 ENCOUNTER — COMMUNICATION - HEALTHEAST (OUTPATIENT)
Dept: INTERNAL MEDICINE | Facility: CLINIC | Age: 48
End: 2018-02-16

## 2018-02-16 DIAGNOSIS — Z96.651 S/P TOTAL KNEE ARTHROPLASTY, RIGHT: ICD-10-CM

## 2018-02-27 ENCOUNTER — OFFICE VISIT - HEALTHEAST (OUTPATIENT)
Dept: PHYSICAL THERAPY | Facility: REHABILITATION | Age: 48
End: 2018-02-27

## 2018-02-27 DIAGNOSIS — I10 ESSENTIAL HYPERTENSION: ICD-10-CM

## 2018-02-27 DIAGNOSIS — Z96.651 HISTORY OF TOTAL RIGHT KNEE REPLACEMENT: ICD-10-CM

## 2018-02-27 DIAGNOSIS — M25.461 SWELLING OF KNEE JOINT, RIGHT: ICD-10-CM

## 2018-02-27 DIAGNOSIS — J45.20 MILD INTERMITTENT ASTHMA IN ADULT WITHOUT COMPLICATION: ICD-10-CM

## 2018-02-27 DIAGNOSIS — N18.4 CKD (CHRONIC KIDNEY DISEASE) STAGE 4, GFR 15-29 ML/MIN (H): ICD-10-CM

## 2018-02-27 DIAGNOSIS — E78.2 MIXED HYPERLIPIDEMIA: ICD-10-CM

## 2018-02-27 DIAGNOSIS — M17.11 PRIMARY OSTEOARTHRITIS OF RIGHT KNEE: ICD-10-CM

## 2018-02-27 DIAGNOSIS — M25.661 KNEE STIFFNESS, RIGHT: ICD-10-CM

## 2018-02-27 DIAGNOSIS — R26.9 ABNORMALITY OF GAIT: ICD-10-CM

## 2018-03-01 ENCOUNTER — OFFICE VISIT - HEALTHEAST (OUTPATIENT)
Dept: PHYSICAL THERAPY | Facility: REHABILITATION | Age: 48
End: 2018-03-01

## 2018-03-01 DIAGNOSIS — N18.4 CKD (CHRONIC KIDNEY DISEASE) STAGE 4, GFR 15-29 ML/MIN (H): ICD-10-CM

## 2018-03-01 DIAGNOSIS — M17.11 PRIMARY OSTEOARTHRITIS OF RIGHT KNEE: ICD-10-CM

## 2018-03-01 DIAGNOSIS — E78.2 MIXED HYPERLIPIDEMIA: ICD-10-CM

## 2018-03-01 DIAGNOSIS — Z96.651 HISTORY OF TOTAL RIGHT KNEE REPLACEMENT: ICD-10-CM

## 2018-03-01 DIAGNOSIS — I10 ESSENTIAL HYPERTENSION: ICD-10-CM

## 2018-03-01 DIAGNOSIS — R26.9 ABNORMALITY OF GAIT: ICD-10-CM

## 2018-03-01 DIAGNOSIS — M25.661 KNEE STIFFNESS, RIGHT: ICD-10-CM

## 2018-03-01 DIAGNOSIS — J45.20 MILD INTERMITTENT ASTHMA IN ADULT WITHOUT COMPLICATION: ICD-10-CM

## 2018-03-01 DIAGNOSIS — M25.461 SWELLING OF KNEE JOINT, RIGHT: ICD-10-CM

## 2018-03-06 ENCOUNTER — RECORDS - HEALTHEAST (OUTPATIENT)
Dept: ADMINISTRATIVE | Facility: OTHER | Age: 48
End: 2018-03-06

## 2018-03-14 ENCOUNTER — COMMUNICATION - HEALTHEAST (OUTPATIENT)
Dept: INTERNAL MEDICINE | Facility: CLINIC | Age: 48
End: 2018-03-14

## 2018-04-04 ENCOUNTER — MEDICAL CORRESPONDENCE (OUTPATIENT)
Dept: HEALTH INFORMATION MANAGEMENT | Facility: CLINIC | Age: 48
End: 2018-04-04

## 2018-04-04 ENCOUNTER — TRANSFERRED RECORDS (OUTPATIENT)
Dept: HEALTH INFORMATION MANAGEMENT | Facility: CLINIC | Age: 48
End: 2018-04-04

## 2018-04-04 ENCOUNTER — RECORDS - HEALTHEAST (OUTPATIENT)
Dept: ADMINISTRATIVE | Facility: OTHER | Age: 48
End: 2018-04-04

## 2018-04-11 ENCOUNTER — RECORDS - HEALTHEAST (OUTPATIENT)
Dept: ADMINISTRATIVE | Facility: OTHER | Age: 48
End: 2018-04-11

## 2018-04-18 ENCOUNTER — REFERRAL (OUTPATIENT)
Dept: TRANSPLANT | Facility: CLINIC | Age: 48
End: 2018-04-18

## 2018-04-18 DIAGNOSIS — Z76.82 ORGAN TRANSPLANT CANDIDATE: ICD-10-CM

## 2018-04-18 DIAGNOSIS — N05.5 NEPHRITIS AND NEPHROPATHY, WITH MEMBRANOPROLIFERATIVE GLOMERULONEPHRITIS: ICD-10-CM

## 2018-04-18 DIAGNOSIS — E78.5 HYPERLIPIDEMIA: ICD-10-CM

## 2018-04-18 DIAGNOSIS — N18.5 CHRONIC RENAL FAILURE, STAGE 5 (H): ICD-10-CM

## 2018-04-18 DIAGNOSIS — Z87.891 HISTORY OF TOBACCO USE: ICD-10-CM

## 2018-04-18 DIAGNOSIS — I10 ESSENTIAL HYPERTENSION: ICD-10-CM

## 2018-04-18 NOTE — LETTER
May 2, 2018        Alla Shrestha  [address]  [place] [address]      Dear Alla Shrestha:     You have recently been [facility] to begin the evaluation process to become a possible recipient.  We have made several attempts to get in touch with you but have been unable to reach you.    If you are interested and/or have any questions, please contact us at  [phone] or [phone].    We look forward to hearing from you.      Thank you,           Kidney Transplant Team  [facility]    CC: PCP, liam RODRIGUEZ

## 2018-04-18 NOTE — TELEPHONE ENCOUNTER
Intake Progress Note      Date/Time Type Contact Phone/Fax              04/18/2018 10:34 AM Phone (Outgoing) Alla Shrestha (Self) [phone] (M)     1st attempt to complete intake, lft a vm msg on the mobile nbr and a msg with pts 'child' as stated when answered on home ph, gave my direct nbr.       By Jana Garcia LPN       04/30/2018 02:56 PM Phone (Outgoing) Alla Shrestha (Self) [phone] (M)     Left Message - Lvm for return call to complete intake.       By Diana Dobbs LPN       04/30/2018 02:58 PM Phone (Outgoing) Alla Shrestha (Self) [phone] (H)     Left Message - Lm for return call to complete intake.       By Diana Dobbs LPN              Trying to reach you letter was sent per workflow, intake will be completed if pt calls back.

## 2018-04-18 NOTE — Clinical Note
Please see smart set orders for pre kidney transplant evaluation. Pt is not yet on dialysis. Thanks, Carolin

## 2018-05-03 VITALS — BODY MASS INDEX: 37.04 KG/M2 | WEIGHT: 236 LBS | HEIGHT: 67 IN

## 2018-05-03 NOTE — TELEPHONE ENCOUNTER
Intake Progress Note  Nurse Call: 5/8/18  Save the Date: 6/11/18      Pt records may be obtained from [facility] in [place], [place]      Insurance information:  [facility]  Policy messina:  SELF  Subscriber/policy/ID number:  [id number]  Group Number:  [id number]            Health Maintenance:  PAP:  N/A  Mammo:  UTD @ [facility]  Dental: NOT UTD  Vaccines: UTD  Special Needs (ie wheelchair, assistance, guardian, interpretor):  NONE    As for the next steps, as long as we are able to get financial approval and receive your medical records, you should be expecting a call from your Transplant Coordinator in about 2 weeks. Your Transplant Coordinator will go into more detail about the evaluation process, but we can put a hold on a tentative date for your transplant evaluation now. Kidney (K/P) evaluations are scheduled for Monday, Wednesday, or Thursdays, and can start as early as 6:30 am and end as late as 4:30pm. Would one of these days work better for you? Great, I am going to put a hold on Day/Month for you. Again, this appointment day and time is subject to change and is dependent on financial approval from your insurance company and our receiving your medical records so we are able to meet your individual needs.    I also want to schedule your first call with the coordinator. (Offer a couple choices and schedule patient's preferred date and time.)      Inform patient on the need to arrange age appropriate cancer screening, vaccines up to date and dental clearance    Reviewed evaluation process and reminded patient to complete questionnaire, complete medical records release, and review packet prior to evaluation visit     Informed patient that coordinator will review their chart and insurance coverage and if no concerns they will receive a call from a  to schedule evaluation

## 2018-05-07 ENCOUNTER — MEDICAL CORRESPONDENCE (OUTPATIENT)
Dept: TRANSPLANT | Facility: CLINIC | Age: 48
End: 2018-05-07

## 2018-05-09 ENCOUNTER — RECORDS - HEALTHEAST (OUTPATIENT)
Dept: ADMINISTRATIVE | Facility: OTHER | Age: 48
End: 2018-05-09

## 2018-05-09 NOTE — TELEPHONE ENCOUNTER
Reviewed records available to date. 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 Medical history also includes HTN, hyperlipidemia, gout, secondary hyperparathyroidism or renal origin, smoking and remains current smoker, history of alcohol use every Thursday, Friday, Saturday which has ceased a few years ago, obesity.  Per Dr. Liliya Capellan's note dated 04- pt's current height is 5 foot 7 inches and BMI is 38 - meets criteria for full eval now.  Past surgical history includes right total knee arthroplasty 12/20/2017 and left knee arthroplasty 11/15/2017.  All OK to proceed with pre kidney transplant evaluation.     Contacted patient and introduced myself as their Transplant Coordinator, also introduced the role of the Transplant Coordinator in the transplant process.  Explained the purpose of this call including reviewing next steps and answering questions.  Pt confirmed she is interested in pursuing kidney transplant now. Pt explained to me my last call about 1 week ago that she started attending weight loss management here, but then had her knee replacements Nov and then Dec 2018 - all successfully recovered. Pt confirmed she is still smoking and only drinks a glass of wine now occasionally.   Confirmed Referring Provider, Dialysis Center, and Primary Care Physician. Notified patient of the importance of continued communication with referring providers and primary care physicians.    Reviewed components of transplant evaluation process including necessary appointments, tests, and procedures.    Answered questions for patient regarding evaluation, provided my name and contact information and requested they call with any additional questions.    Determined that patient would like additional information regarding transplant by:     Drop Down choices: Mail, Email, MyChart, Phone Call   Encourage MyChart   Notified patients that they  will hear from a Transplant  to schedule evaluation.  Instructed pt to bring someone along with her to eval - pt will bring her SO and brother. Pt expressed very good understanding of all and was in good agreement with the plan.      Smart set orders into EPIC - routed to .

## 2018-05-15 ENCOUNTER — COMMUNICATION - HEALTHEAST (OUTPATIENT)
Dept: SCHEDULING | Facility: CLINIC | Age: 48
End: 2018-05-15

## 2018-05-15 ENCOUNTER — OFFICE VISIT - HEALTHEAST (OUTPATIENT)
Dept: INTERNAL MEDICINE | Facility: CLINIC | Age: 48
End: 2018-05-15

## 2018-05-15 DIAGNOSIS — I50.9 CHF (CONGESTIVE HEART FAILURE) (H): ICD-10-CM

## 2018-05-15 DIAGNOSIS — R05.9 COUGH: ICD-10-CM

## 2018-05-15 DIAGNOSIS — J18.9 COMMUNITY ACQUIRED PNEUMONIA OF RIGHT LUNG, UNSPECIFIED PART OF LUNG: ICD-10-CM

## 2018-05-15 LAB
ANION GAP SERPL CALCULATED.3IONS-SCNC: 14 MMOL/L (ref 5–18)
BNP SERPL-MCNC: 3200 PG/ML (ref 0–67)
BUN SERPL-MCNC: 98 MG/DL (ref 8–22)
CALCIUM SERPL-MCNC: 8.4 MG/DL (ref 8.5–10.5)
CHLORIDE BLD-SCNC: 112 MMOL/L (ref 98–107)
CO2 SERPL-SCNC: 17 MMOL/L (ref 22–31)
CREAT SERPL-MCNC: 10.8 MG/DL (ref 0.6–1.1)
ERYTHROCYTE [DISTWIDTH] IN BLOOD BY AUTOMATED COUNT: 11.9 % (ref 11–14.5)
GFR SERPL CREATININE-BSD FRML MDRD: 4 ML/MIN/1.73M2
GLUCOSE BLD-MCNC: 88 MG/DL (ref 70–125)
HCT VFR BLD AUTO: 28.2 % (ref 35–47)
HGB BLD-MCNC: 9.1 G/DL (ref 12–16)
MCH RBC QN AUTO: 33.2 PG (ref 27–34)
MCHC RBC AUTO-ENTMCNC: 32.3 G/DL (ref 32–36)
MCV RBC AUTO: 103 FL (ref 80–100)
PLATELET # BLD AUTO: 187 THOU/UL (ref 140–440)
PMV BLD AUTO: 8.1 FL (ref 7–10)
POTASSIUM BLD-SCNC: 4.4 MMOL/L (ref 3.5–5)
RBC # BLD AUTO: 2.74 MILL/UL (ref 3.8–5.4)
SODIUM SERPL-SCNC: 143 MMOL/L (ref 136–145)
WBC: 8.5 THOU/UL (ref 4–11)

## 2018-05-16 ENCOUNTER — COMMUNICATION - HEALTHEAST (OUTPATIENT)
Dept: SCHEDULING | Facility: CLINIC | Age: 48
End: 2018-05-16

## 2018-06-06 ENCOUNTER — RECORDS - HEALTHEAST (OUTPATIENT)
Dept: ADMINISTRATIVE | Facility: OTHER | Age: 48
End: 2018-06-06

## 2018-06-11 ENCOUNTER — APPOINTMENT (OUTPATIENT)
Dept: GENERAL RADIOLOGY | Facility: CLINIC | Age: 48
End: 2018-06-11
Attending: PHYSICIAN ASSISTANT
Payer: COMMERCIAL

## 2018-06-11 ENCOUNTER — OFFICE VISIT (OUTPATIENT)
Dept: TRANSPLANT | Facility: CLINIC | Age: 48
End: 2018-06-11
Attending: INTERNAL MEDICINE
Payer: COMMERCIAL

## 2018-06-11 ENCOUNTER — COMMUNICATION - HEALTHEAST (OUTPATIENT)
Dept: INTERNAL MEDICINE | Facility: CLINIC | Age: 48
End: 2018-06-11

## 2018-06-11 ENCOUNTER — RESULTS ONLY (OUTPATIENT)
Dept: OTHER | Facility: CLINIC | Age: 48
End: 2018-06-11

## 2018-06-11 ENCOUNTER — RECORDS - HEALTHEAST (OUTPATIENT)
Dept: ADMINISTRATIVE | Facility: OTHER | Age: 48
End: 2018-06-11

## 2018-06-11 ENCOUNTER — DOCUMENTATION ONLY (OUTPATIENT)
Dept: TRANSPLANT | Facility: CLINIC | Age: 48
End: 2018-06-11

## 2018-06-11 ENCOUNTER — RADIANT APPOINTMENT (OUTPATIENT)
Dept: CARDIOLOGY | Facility: CLINIC | Age: 48
End: 2018-06-11
Attending: PHYSICIAN ASSISTANT
Payer: COMMERCIAL

## 2018-06-11 ENCOUNTER — OFFICE VISIT (OUTPATIENT)
Dept: TRANSPLANT | Facility: CLINIC | Age: 48
End: 2018-06-11
Attending: SURGERY
Payer: COMMERCIAL

## 2018-06-11 VITALS
DIASTOLIC BLOOD PRESSURE: 95 MMHG | WEIGHT: 253.6 LBS | HEART RATE: 73 BPM | SYSTOLIC BLOOD PRESSURE: 167 MMHG | OXYGEN SATURATION: 86 % | RESPIRATION RATE: 18 BRPM | BODY MASS INDEX: 39.8 KG/M2 | TEMPERATURE: 97.9 F | HEIGHT: 67 IN

## 2018-06-11 DIAGNOSIS — Z76.82 ORGAN TRANSPLANT CANDIDATE: Primary | ICD-10-CM

## 2018-06-11 DIAGNOSIS — I10 ESSENTIAL HYPERTENSION: ICD-10-CM

## 2018-06-11 DIAGNOSIS — N18.5 CHRONIC RENAL FAILURE, STAGE 5 (H): ICD-10-CM

## 2018-06-11 DIAGNOSIS — J45.909 ASTHMA, UNSPECIFIED ASTHMA SEVERITY, UNSPECIFIED WHETHER COMPLICATED, UNSPECIFIED WHETHER PERSISTENT: Primary | ICD-10-CM

## 2018-06-11 DIAGNOSIS — N18.5 CKD (CHRONIC KIDNEY DISEASE) STAGE 5, GFR LESS THAN 15 ML/MIN (H): ICD-10-CM

## 2018-06-11 DIAGNOSIS — Z76.82 KIDNEY TRANSPLANT CANDIDATE: ICD-10-CM

## 2018-06-11 DIAGNOSIS — E78.5 HYPERLIPIDEMIA: ICD-10-CM

## 2018-06-11 DIAGNOSIS — Z76.82 ORGAN TRANSPLANT CANDIDATE: ICD-10-CM

## 2018-06-11 DIAGNOSIS — Z87.891 HISTORY OF TOBACCO USE: ICD-10-CM

## 2018-06-11 DIAGNOSIS — N05.5 NEPHRITIS AND NEPHROPATHY, WITH MEMBRANOPROLIFERATIVE GLOMERULONEPHRITIS: ICD-10-CM

## 2018-06-11 DIAGNOSIS — R60.9 EDEMA: ICD-10-CM

## 2018-06-11 DIAGNOSIS — E78.5 DYSLIPIDEMIA: ICD-10-CM

## 2018-06-11 DIAGNOSIS — N18.5 CKD (CHRONIC KIDNEY DISEASE) STAGE 5, GFR LESS THAN 15 ML/MIN (H): Primary | ICD-10-CM

## 2018-06-11 DIAGNOSIS — Z72.0 TOBACCO ABUSE: ICD-10-CM

## 2018-06-11 DIAGNOSIS — N05.2 MEMBRANOUS GLOMERULONEPHRITIS: ICD-10-CM

## 2018-06-11 DIAGNOSIS — J45.909 ASTHMA, UNSPECIFIED ASTHMA SEVERITY, UNSPECIFIED WHETHER COMPLICATED, UNSPECIFIED WHETHER PERSISTENT: ICD-10-CM

## 2018-06-11 LAB
ABO + RH BLD: NORMAL
ABO + RH BLD: NORMAL
ALBUMIN SERPL-MCNC: 3 G/DL (ref 3.4–5)
ALBUMIN UR-MCNC: >499 MG/DL
ALP SERPL-CCNC: 126 U/L (ref 40–150)
ALT SERPL W P-5'-P-CCNC: 49 U/L (ref 0–50)
ANION GAP SERPL CALCULATED.3IONS-SCNC: 16 MMOL/L (ref 3–14)
APPEARANCE UR: ABNORMAL
APTT PPP: 30 SEC (ref 22–37)
AST SERPL W P-5'-P-CCNC: 43 U/L (ref 0–45)
BACTERIA #/AREA URNS HPF: ABNORMAL /HPF
BASOPHILS # BLD AUTO: 0 10E9/L (ref 0–0.2)
BASOPHILS NFR BLD AUTO: 0.4 %
BILIRUB SERPL-MCNC: 0.4 MG/DL (ref 0.2–1.3)
BILIRUB UR QL STRIP: NEGATIVE
BLOOD BANK CMNT PATIENT-IMP: NORMAL
BLOOD BANK CMNT PATIENT-IMP: NORMAL
BUN SERPL-MCNC: 109 MG/DL (ref 7–30)
CALCIUM SERPL-MCNC: 7.2 MG/DL (ref 8.5–10.1)
CARDIOLIPIN ANTIBODY IGG: <1.6 GPL-U/ML (ref 0–19.9)
CARDIOLIPIN ANTIBODY IGM: 1.1 MPL-U/ML (ref 0–19.9)
CHLORIDE SERPL-SCNC: 107 MMOL/L (ref 94–109)
CMV IGG SERPL QL IA: >8 AI (ref 0–0.8)
CO2 SERPL-SCNC: 17 MMOL/L (ref 20–32)
COLOR UR AUTO: YELLOW
CREAT SERPL-MCNC: 13.3 MG/DL (ref 0.52–1.04)
DIFFERENTIAL METHOD BLD: ABNORMAL
DLCOCOR-%PRED-PRE: 110 %
DLCOCOR-PRE: 26.74 ML/MIN/MMHG
DLCOUNC-%PRED-PRE: 94 %
DLCOUNC-PRE: 23 ML/MIN/MMHG
DLCOUNC-PRED: 24.22 ML/MIN/MMHG
EBV VCA IGG SER QL IA: 7.9 AI (ref 0–0.8)
EOSINOPHIL # BLD AUTO: 0.2 10E9/L (ref 0–0.7)
EOSINOPHIL NFR BLD AUTO: 2.5 %
ERV-%PRED-PRE: 50 %
ERV-PRE: 0.28 L
ERV-PRED: 0.56 L
ERYTHROCYTE [DISTWIDTH] IN BLOOD BY AUTOMATED COUNT: 15.9 % (ref 10–15)
EXPTIME-PRE: 6.13 SEC
FEF2575-%PRED-POST: 36 %
FEF2575-%PRED-PRE: 36 %
FEF2575-POST: 1.06 L/SEC
FEF2575-PRE: 1.03 L/SEC
FEF2575-PRED: 2.87 L/SEC
FEFMAX-%PRED-PRE: 53 %
FEFMAX-PRE: 3.87 L/SEC
FEFMAX-PRED: 7.28 L/SEC
FEV1-%PRED-PRE: 50 %
FEV1-PRE: 1.47 L
FEV1FEV6-PRE: 71 %
FEV1FEV6-PRED: 83 %
FEV1FVC-PRE: 71 %
FEV1FVC-PRED: 81 %
FEV1SVC-PRE: 67 %
FEV1SVC-PRED: 74 %
FIFMAX-PRE: 3.44 L/SEC
FRCPLETH-%PRED-PRE: 140 %
FRCPLETH-PRE: 4.03 L
FRCPLETH-PRED: 2.86 L
FVC-%PRED-PRE: 58 %
FVC-PRE: 2.09 L
FVC-PRED: 3.59 L
GFR SERPL CREATININE-BSD FRML MDRD: 3 ML/MIN/1.7M2
GLUCOSE SERPL-MCNC: 87 MG/DL (ref 70–99)
GLUCOSE UR STRIP-MCNC: 150 MG/DL
HBV CORE AB SERPL QL IA: NONREACTIVE
HBV SURFACE AB SERPL IA-ACNC: 1 M[IU]/ML
HBV SURFACE AG SERPL QL IA: NONREACTIVE
HCG SERPL QL: NEGATIVE
HCT VFR BLD AUTO: 32.1 % (ref 35–47)
HCV AB SERPL QL IA: NONREACTIVE
HGB BLD-MCNC: 9.6 G/DL (ref 11.7–15.7)
HGB UR QL STRIP: ABNORMAL
HIV 1+2 AB+HIV1 P24 AG SERPL QL IA: NONREACTIVE
IC-%PRED-PRE: 57 %
IC-PRE: 1.91 L
IC-PRED: 3.35 L
IMM GRANULOCYTES # BLD: 0 10E9/L (ref 0–0.4)
IMM GRANULOCYTES NFR BLD: 0.3 %
INR PPP: 1.13 (ref 0.86–1.14)
KETONES UR STRIP-MCNC: NEGATIVE MG/DL
LEUKOCYTE ESTERASE UR QL STRIP: ABNORMAL
LYMPHOCYTES # BLD AUTO: 1.8 10E9/L (ref 0.8–5.3)
LYMPHOCYTES NFR BLD AUTO: 24.4 %
MCH RBC QN AUTO: 32.4 PG (ref 26.5–33)
MCHC RBC AUTO-ENTMCNC: 29.9 G/DL (ref 31.5–36.5)
MCV RBC AUTO: 108 FL (ref 78–100)
MONOCYTES # BLD AUTO: 0.5 10E9/L (ref 0–1.3)
MONOCYTES NFR BLD AUTO: 7 %
MVV-%PRED-PRE: 52 %
MVV-PRE: 55 L/MIN
MVV-PRED: 105 L/MIN
NEUTROPHILS # BLD AUTO: 4.7 10E9/L (ref 1.6–8.3)
NEUTROPHILS NFR BLD AUTO: 65.4 %
NITRATE UR QL: NEGATIVE
NRBC # BLD AUTO: 0 10*3/UL
NRBC BLD AUTO-RTO: 0 /100
PH UR STRIP: 6 PH (ref 5–7)
PLATELET # BLD AUTO: 173 10E9/L (ref 150–450)
POTASSIUM SERPL-SCNC: 3.9 MMOL/L (ref 3.4–5.3)
PROT SERPL-MCNC: 6.4 G/DL (ref 6.8–8.8)
RBC # BLD AUTO: 2.96 10E12/L (ref 3.8–5.2)
RBC #/AREA URNS AUTO: 7 /HPF (ref 0–2)
RVPLETH-%PRED-PRE: 203 %
RVPLETH-PRE: 3.75 L
RVPLETH-PRED: 1.84 L
SODIUM SERPL-SCNC: 140 MMOL/L (ref 133–144)
SOURCE: ABNORMAL
SP GR UR STRIP: 1.01 (ref 1–1.03)
SPECIMEN EXP DATE BLD: NORMAL
SQUAMOUS #/AREA URNS AUTO: 1 /HPF (ref 0–1)
T PALLIDUM AB SER QL: NONREACTIVE
THROMBIN TIME: 17.3 SEC (ref 13–19)
TLCPLETH-%PRED-PRE: 109 %
TLCPLETH-PRE: 5.94 L
TLCPLETH-PRED: 5.44 L
UROBILINOGEN UR STRIP-MCNC: 0 MG/DL (ref 0–2)
VA-%PRED-PRE: 75 %
VA-PRE: 4.2 L
VC-%PRED-PRE: 56 %
VC-PRE: 2.19 L
VC-PRED: 3.91 L
VZV IGG SER QL IA: 5.5 AI (ref 0–0.8)
WBC # BLD AUTO: 7.2 10E9/L (ref 4–11)
WBC #/AREA URNS AUTO: 27 /HPF (ref 0–5)

## 2018-06-11 PROCEDURE — 85670 THROMBIN TIME PLASMA: CPT | Performed by: PHYSICIAN ASSISTANT

## 2018-06-11 PROCEDURE — 86850 RBC ANTIBODY SCREEN: CPT | Performed by: PHYSICIAN ASSISTANT

## 2018-06-11 PROCEDURE — 86665 EPSTEIN-BARR CAPSID VCA: CPT | Performed by: PHYSICIAN ASSISTANT

## 2018-06-11 PROCEDURE — 85730 THROMBOPLASTIN TIME PARTIAL: CPT | Performed by: PHYSICIAN ASSISTANT

## 2018-06-11 PROCEDURE — 87340 HEPATITIS B SURFACE AG IA: CPT | Performed by: PHYSICIAN ASSISTANT

## 2018-06-11 PROCEDURE — 86644 CMV ANTIBODY: CPT | Performed by: PHYSICIAN ASSISTANT

## 2018-06-11 PROCEDURE — 86803 HEPATITIS C AB TEST: CPT | Performed by: PHYSICIAN ASSISTANT

## 2018-06-11 PROCEDURE — 81241 F5 GENE: CPT | Performed by: PHYSICIAN ASSISTANT

## 2018-06-11 PROCEDURE — 86905 BLOOD TYPING RBC ANTIGENS: CPT | Performed by: PHYSICIAN ASSISTANT

## 2018-06-11 PROCEDURE — 86787 VARICELLA-ZOSTER ANTIBODY: CPT | Performed by: PHYSICIAN ASSISTANT

## 2018-06-11 PROCEDURE — 40000866 ZZHCL STATISTIC HIV 1/2 ANTIGEN/ANTIBODY PRETRANSPLANT ONLY: Performed by: PHYSICIAN ASSISTANT

## 2018-06-11 PROCEDURE — 85025 COMPLETE CBC W/AUTO DIFF WBC: CPT | Performed by: PHYSICIAN ASSISTANT

## 2018-06-11 PROCEDURE — 81001 URINALYSIS AUTO W/SCOPE: CPT | Performed by: PHYSICIAN ASSISTANT

## 2018-06-11 PROCEDURE — 86147 CARDIOLIPIN ANTIBODY EA IG: CPT | Performed by: PHYSICIAN ASSISTANT

## 2018-06-11 PROCEDURE — G0463 HOSPITAL OUTPT CLINIC VISIT: HCPCS | Mod: ZF

## 2018-06-11 PROCEDURE — 84703 CHORIONIC GONADOTROPIN ASSAY: CPT | Performed by: PHYSICIAN ASSISTANT

## 2018-06-11 PROCEDURE — 36415 COLL VENOUS BLD VENIPUNCTURE: CPT | Performed by: PHYSICIAN ASSISTANT

## 2018-06-11 PROCEDURE — 86704 HEP B CORE ANTIBODY TOTAL: CPT | Performed by: PHYSICIAN ASSISTANT

## 2018-06-11 PROCEDURE — 86706 HEP B SURFACE ANTIBODY: CPT | Performed by: PHYSICIAN ASSISTANT

## 2018-06-11 PROCEDURE — 86900 BLOOD TYPING SEROLOGIC ABO: CPT | Performed by: PHYSICIAN ASSISTANT

## 2018-06-11 PROCEDURE — 85610 PROTHROMBIN TIME: CPT | Performed by: PHYSICIAN ASSISTANT

## 2018-06-11 PROCEDURE — 80053 COMPREHEN METABOLIC PANEL: CPT | Performed by: PHYSICIAN ASSISTANT

## 2018-06-11 PROCEDURE — 86780 TREPONEMA PALLIDUM: CPT | Performed by: PHYSICIAN ASSISTANT

## 2018-06-11 PROCEDURE — 81240 F2 GENE: CPT | Performed by: PHYSICIAN ASSISTANT

## 2018-06-11 PROCEDURE — 85613 RUSSELL VIPER VENOM DILUTED: CPT | Performed by: PHYSICIAN ASSISTANT

## 2018-06-11 PROCEDURE — 86901 BLOOD TYPING SEROLOGIC RH(D): CPT | Performed by: PHYSICIAN ASSISTANT

## 2018-06-11 PROCEDURE — 86480 TB TEST CELL IMMUN MEASURE: CPT | Performed by: PHYSICIAN ASSISTANT

## 2018-06-11 ASSESSMENT — PAIN SCALES - GENERAL: PAINLEVEL: NO PAIN (0)

## 2018-06-11 NOTE — PROGRESS NOTES
Assessment and Plan:  1. Kidney transplant evaluation - patient is not a transplant candidate at this time due to her obesity.  However, should she become a candidate, she would have some medical issues to address. Benefits of a living donor transplant were discussed.  2. 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  Patient would benefit from a kidney transplant, preferably a living donor kidney transplant.  3. HTN - not well controlled, likely due to volume overload.  Will refer patient back to primary nephrologist to manage.  4. Morbid obesity - patient has a BMI of ~ 40, although likely some of this is due to volume overload as she has gained ~ 18 lbs in the last month.  To meet a BMI guideline of 35 or less, patient would need to be at less than 225 lbs.  She also has significant truncal obesity and encouraged her to increase exercise and watch caloric intake.  Patient may benefit from reinitiating her care with Weight Management Program.  5. Cardiac risk - patient will require Cardiology evaluation prior to transplant.  6. H/o alcohol abuse - patient has a significant history of alcohol abuse, including DUI and extended alf time.  She reports going to AA meetings, except she also continues to drink at least once a month.  Her previous alcohol issues reportedly centered around stress and would recommend CD evaluation as post transplant complications may put patient at risk of further alcohol abuse.  7. Tobacco abuse - strongly encouraged patient to undergo smoking cessation.  She will require PFTs prior to transplantation.  8. Pulmonary rhonchi, interstitial pulmonary edema on CXR and LE edema - patient is volume overloaded and would likely due will with starting dialysis.  Discussed patient with her primary nephrologist who will work  on getting her in for evaluation this week.  9. Anemia - certainly patient's underlying kidney dysfunction is playing a role, but would like to see patient's initial Hematology evaluation to ensure nothing else is contributing.  Patient is also iron deficient and would consider further evaluation, such as colonoscopy to rule GI losses.  10. Medical noncompliance - patient reportedly has missed several follow up visits with her nephrologist and with her intermittent care in the past, would recommend a compliance contract.    Discussed the risks and benefits of a transplant, including the risk of surgery and immunosuppression medications.  Patients overall evaluation will be discussed in the Transplant Program's regular meeting with a final recommendation on the patients suitability for transplant to be made at that time.    Consult:  Alla Shrestha was seen in consultation at the request of Dr. Diaz Littlejohn for evaluation as a potential kidney transplant recipient.    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    HPI:  Patient reports feeling okay overall with some medical complaints.  Patient reports first presenting in  while living in [place] with lower extremity edema and also found to have hematuria, abnormal kidney function and hypertension.  Patient underwent native kidney biopsy and was diagnosed with membranous glomerulonephritis.  She was reportedly treated with oral steroids for about 6 months and denies any receiving cyclophosphamide.  Patient says she had no improvement in her kidney disease during her treatment.  During the time the patient was on steroids, her mother  and patient had a lot of stress.  She increased her alcohol drinking from 3 days a week to daily use.  Patient was then arrested for DUI and spent about 9 months in halfway.  She says they refused to refer her to a nephrologist  "during her time in group home, so her treatment stopped.  After getting out of group home, patient moved back to [place] and started seeing Nephrology again.  Her kidney function at this time was low and she had an AV fistula placed for possible dialysis.  However, with better blood pressure management, her kidney function stabilized and has not progressed much over the last 5 or so years outside of a recent slow progression.  After changing nephrologists, patient was referred for kidney transplantation just over a year ago, but due to her morbid obesity, she was instructed to lose weight first.  Patient was seen in [facility] and started that process, but then stopped after having to undergo bilateral total knee replacements last fall.  Now that she has been more active, she has lost some weight and was rereferred for transplant evaluation.    Patient has a history of morbid obesity with her weight up to a maximum of ~ 322 lbs in 2009 with a BMI of 50.  She reportedly was about 300 lbs last year when she started her weight loss program and got down to a low of ~ 236 lbs just over a month ago, but is now back up ~ 18 lbs in the last month and has a BMI still ~ 40.  Other significant past medical issues include anemia since her diagnosis with kidney issues.  Patient is following with Hematology and has been on ANNY for the last 7-8 years.  She is iron deficient, although her anemia is felt to be due to her kidney disease.  Patient doesn't believe she has had a bone marrow biopsy.    In addition to patient's previous alcohol abuse history, she continues to drink 1-2 glasses of wine \"about once a month.\"  She does attend AA meetings, but also was found to have an episode of acute pancreatitis several years ago.    Talked to patient's nephrologist about her worsening kidney function and her nephrologist reported that the patient hasn't been as compliant with her medical appointments with several \"no shows.\"         " Kidney Disease Hx:        Kidney Disease Dx: membranous glomerulonephritis       Biopsy Proven: Yes; 2009         On Dialysis: No       Primary Nephrologist: Dr. Timi Sotelo Hx:       h/o HTN: Yes  Usual BP: 150/80-90s       h/o DM:  No       h/o Protein in Urine: Yes        h/o Blood in Urine:  No       h/o Kidney Stones:  No       h/o UTI: No       h/o Chronic NSAID Use: No         Previous Transplant Hx:       No         Transplant Sensitization Hx:       Previous Tx: No       Blood Transfusion: Yes       Pregnancy: Yes         Uremic Symptoms:       Fatigue: Yes; Cold: No; Nausea: No; Poor Appetite: No; Metallic Taste: No; Edema: Yes;         Cardiovascular Hx:       h/o Cardiac Issues: No       Exercise Tolerance: shortness of breath with exertion.         Health Maintenance:       Colonoscopy: Not indicated, Mammogram: Up to date and PAP: Not up to date         Potential Donor(s): Yes; brother    ROS:  A comprehensive review of systems was obtained and negative, except as noted in the HPI or PMH.    PMH:   Medical records were obtained and reviewed  and PMH was discussed with patient and noted below.  Past Medical History:   Diagnosis Date     Alcohol abuse      Anemia in chronic renal disease      Asthma      CKD (chronic kidney disease) stage 5, GFR less than 15 ml/min (H)     Stage IV     Dyslipidemia      Gout      History of blood transfusion 2018    [facility]     Hypertension, renal      Iron deficiency      Membranous glomerulonephritis 2009     Metabolic acidosis      Nephrotic syndrome with membranous glomerulonephritis      Obesity      Osteoarthritis     Knees     Pancreatitis     pt not aware of this diagnosis     Secondary renal hyperparathyroidism (H)      Tobacco abuse      Vitamin D deficiency        PSH:   Past Surgical History:   Procedure Laterality Date     BIOPSY  2006    Kidney @ [facility] in [place], [place]     C TOTAL KNEE ARTHROPLASTY Bilateral 2017    TKA     CREATE  FISTULA ARTERIOVENOUS UPPER EXTREMITY Right 2008     partial hysterectomy  1998    [facility] in [place], [place]     Personal or family history of bleeding or anesthesia problems: No    Family Hx:  Family History   Problem Relation Age of Onset     Hypertension Mother      Lung Cancer Mother      Chronic Obstructive Pulmonary Disease Mother      Coronary Artery Disease Father      Hypertension Father      CEREBROVASCULAR DISEASE Maternal Grandmother      Other Cancer Maternal Grandfather      Coronary Artery Disease Paternal Grandfather      Hypertension Paternal Grandfather      Bipolar Disorder Brother      Chronic Obstructive Pulmonary Disease Brother        Personal Hx:   Social History     Social History     Marital status: Single     Spouse name: N/A     Number of children: 1     Years of education: N/A     Occupational History     Not on file.     Social History Main Topics     Smoking status: Current Every Day Smoker     Types: Cigarettes     Smokeless tobacco: Never Used      Comment: 6 cigs /day     Alcohol use No      Comment: h/o alcohol abuse with previous DUI conviction     Drug use: No     Sexual activity: Yes     Other Topics Concern     Not on file     Social History Narrative       Allergies:  Allergies   Allergen Reactions     Penicillins        Medications:  Prior to Admission medications    Medication Sig Start Date End Date Taking? Authorizing Provider   calcitRIOL (ROCALTROL) 0.25 MCG capsule Take 0.25 mcg by mouth daily.   Yes Reported, Patient   metoprolol (LOPRESSOR) 25 MG tablet Take 25 mg by mouth 2 times daily.   Yes Reported, Patient   naltrexone (DEPADE;REVIA) 50 MG tablet Take 1/2 Tablet daily 10/29/17  Yes Maggie Natarajan PA-C   simvastatin (ZOCOR) 20 MG tablet Take 1 tablet by mouth daily.   Yes Reported, Patient   sodium bicarbonate 650 MG tablet Take 325 mg by mouth 3 times daily.   Yes Reported, Patient   traZODone (DESYREL) 50 MG tablet Take 1 tablet by mouth At Bedtime.   Yes  "Reported, Patient   lisinopril (PRINIVIL,ZESTRIL) 40 MG tablet Take 1 tablet (40 mg) by mouth daily 4/29/14   Andre Smith MD       Vitals:  BP (!) 167/95 (Patient Position: Sitting)  Pulse 73  Temp 97.9  F (36.6  C)  Resp 18  Ht 1.702 m (5' 7\")  Wt 115 kg (253 lb 9.6 oz)  SpO2 (!) 86%  BMI 39.72 kg/m2    Exam:  GENERAL APPEARANCE: alert and no distress  HENT: mouth without ulcers or lesions  LYMPHATICS: no cervical or supraclavicular nodes  RESP: diffuse, coarse rhonchi - no rales or wheezes  CV: regular rhythm, normal rate, no rub, no murmur  EDEMA: no LE edema bilaterally  ABDOMEN: soft, nondistended, nontender, bowel sounds normal, obese  MS: extremities normal - no gross deformities noted, no evidence of inflammation in joints, no muscle tenderness; right upper extremity AV fistula with good thrill  SKIN: no rash    Results:   Labs and imaging were ordered for this visit and reviewed by me.  Recent Results (from the past 336 hour(s))   Comprehensive metabolic panel [LAB17]    Collection Time: 06/11/18  7:55 AM   Result Value Ref Range    Sodium 140 133 - 144 mmol/L    Potassium 3.9 3.4 - 5.3 mmol/L    Chloride 107 94 - 109 mmol/L    Carbon Dioxide 17 (L) 20 - 32 mmol/L    Anion Gap 16 (H) 3 - 14 mmol/L    Glucose 87 70 - 99 mg/dL    Urea Nitrogen 109 (H) 7 - 30 mg/dL    Creatinine 13.30 (H) 0.52 - 1.04 mg/dL    GFR Estimate 3 (L) >60 mL/min/1.7m2    GFR Estimate If Black 4 (L) >60 mL/min/1.7m2    Calcium 7.2 (L) 8.5 - 10.1 mg/dL    Bilirubin Total 0.4 0.2 - 1.3 mg/dL    Albumin 3.0 (L) 3.4 - 5.0 g/dL    Protein Total 6.4 (L) 6.8 - 8.8 g/dL    Alkaline Phosphatase 126 40 - 150 U/L    ALT 49 0 - 50 U/L    AST 43 0 - 45 U/L   Cardiolipin Samara IgG and IgM [TFW4412]    Collection Time: 06/11/18  7:55 AM   Result Value Ref Range    Cardiolipin Antibody IgG <1.6 0.0 - 19.9 GPL-U/mL    Cardiolipin Antibody IgM 1.1 0.0 - 19.9 MPL-U/mL   HCG qualitative [[id number]]    Collection Time: 06/11/18  7:55 AM "   Result Value Ref Range    HCG Qualitative Serum Negative NEG^Negative   INR [[id number]]    Collection Time: 06/11/18  7:55 AM   Result Value Ref Range    INR 1.13 0.86 - 1.14   Partial thromboplastin time [LAB56]    Collection Time: 06/11/18  7:55 AM   Result Value Ref Range    PTT 30 22 - 37 sec   CBC with platelets differential [[id number]]    Collection Time: 06/11/18  7:55 AM   Result Value Ref Range    WBC 7.2 4.0 - 11.0 10e9/L    RBC Count 2.96 (L) 3.8 - 5.2 10e12/L    Hemoglobin 9.6 (L) 11.7 - 15.7 g/dL    Hematocrit 32.1 (L) 35.0 - 47.0 %     (H) 78 - 100 fl    MCH 32.4 26.5 - 33.0 pg    MCHC 29.9 (L) 31.5 - 36.5 g/dL    RDW 15.9 (H) 10.0 - 15.0 %    Platelet Count 173 150 - 450 10e9/L    Diff Method Automated Method     % Neutrophils 65.4 %    % Lymphocytes 24.4 %    % Monocytes 7.0 %    % Eosinophils 2.5 %    % Basophils 0.4 %    % Immature Granulocytes 0.3 %    Nucleated RBCs 0 0 /100    Absolute Neutrophil 4.7 1.6 - 8.3 10e9/L    Absolute Lymphocytes 1.8 0.8 - 5.3 10e9/L    Absolute Monocytes 0.5 0.0 - 1.3 10e9/L    Absolute Eosinophils 0.2 0.0 - 0.7 10e9/L    Absolute Basophils 0.0 0.0 - 0.2 10e9/L    Abs Immature Granulocytes 0.0 0 - 0.4 10e9/L    Absolute Nucleated RBC 0.0    CMV Antibody IgG [[id number]]    Collection Time: 06/11/18  7:55 AM   Result Value Ref Range    CMV Antibody IgG >8.0 (H) 0.0 - 0.8 AI   EBV Capsid Antibody IgG [[id number]]    Collection Time: 06/11/18  7:55 AM   Result Value Ref Range    EBV Capsid Antibody IgG 7.9 (H) 0.0 - 0.8 AI   Treponema Abs w Reflex to RPR and Titer [[id number]]    Collection Time: 06/11/18  7:55 AM   Result Value Ref Range    Treponema Antibodies Nonreactive NR^Nonreactive   Varicella Zoster Virus Antibody IgG [[id number]]    Collection Time: 06/11/18  7:55 AM   Result Value Ref Range    Varicella Zoster Virus Antibody IgG 5.5 (H) 0.0 - 0.8 AI   ABO/Rh type and screen [[id number]]    Collection Time: 06/11/18  7:56 AM   Result Value Ref Range    ABO PENDING      Antibody Screen PENDING     Test Valid Only At          [facility]    Specimen Expires 06/14/2018    ABO Subtyping [[id number]]    Collection Time: 06/11/18  7:56 AM   Result Value Ref Range    Antigen Type Canceled, Test credited     Blood Bank Comment       Canceled, Test credited  TESTING NOT INDICATED. PATIENT IS TYPE O     ABO type [[id number]]    Collection Time: 06/11/18  8:01 AM   Result Value Ref Range    ABO O     RH(D) Pos     Specimen Expires 06/14/2018    Routine UA with microscopic [[id number]]    Collection Time: 06/11/18 10:29 AM   Result Value Ref Range    Color Urine Yellow     Appearance Urine Slightly Cloudy     Glucose Urine 150 (A) NEG^Negative mg/dL    Bilirubin Urine Negative NEG^Negative    Ketones Urine Negative NEG^Negative mg/dL    Specific Gravity Urine 1.011 1.003 - 1.035    Blood Urine Small (A) NEG^Negative    pH Urine 6.0 5.0 - 7.0 pH    Protein Albumin Urine >499 (A) NEG^Negative mg/dL    Urobilinogen mg/dL 0.0 0.0 - 2.0 mg/dL    Nitrite Urine Negative NEG^Negative    Leukocyte Esterase Urine Small (A) NEG^Negative    Source Midstream Urine     WBC Urine 27 (H) 0 - 5 /HPF    RBC Urine 7 (H) 0 - 2 /HPF    Bacteria Urine Few (A) NEG^Negative /HPF    Squamous Epithelial /HPF Urine 1 0 - 1 /HPF

## 2018-06-11 NOTE — MR AVS SNAPSHOT
After Visit Summary   6/11/2018    Alla Shrestha    MRN: [id number]           Patient Information     Date Of Birth          1970        Visit Information        Provider Department      6/11/2018 9:30 AM Thelma Harman, LAYLA [facility] Solid Organ Transplant        Today's Diagnoses     Organ transplant candidate    -  1       Follow-ups after your visit        Your next 10 appointments already scheduled     Jun 11, 2018  2:40 PM CDT   ecg with  CV EKG   [facility] [place] [facility] ([facility])    [address] [address] [phone]            Jun 11, 2018  3:00 PM CDT   FULL PULMONARY FUNCTION with  PFL D   [facility] Pulmonary Function Testing ([facility])    [address] [address] [phone]              Who to contact     If you have questions or need follow up information about today's clinic visit or your schedule please contact [facility] SOLID ORGAN TRANSPLANT directly at [phone].  Normal or non-critical lab and imaging results will be communicated to you by MyChart, letter or phone within 4 business days after the clinic has received the results. If you do not hear from us within 7 days, please contact the clinic through MyChart or phone. If you have a critical or abnormal lab result, we will notify you by phone as soon as possible.  Submit refill requests through [facility] or call your pharmacy and they will forward the refill request to us. Please allow 3 business days for your refill to be completed.          Additional Information About Your Visit        Care EveryWhere ID     This is your Care EveryWhere ID. This could be used by other organizations to access your [facility] medical records  [id number]         Blood Pressure from Last 3 Encounters:   06/11/18 (!) 167/95   09/26/17 (!) 177/107   08/03/17 144/88    Weight from Last 3 Encounters:    06/11/18 115 kg (253 lb 9.6 oz)   05/03/18 107 kg (236 lb)   09/26/17 119.8 kg (264 lb 1.6 oz)              Today, you had the following     No orders found for display       Primary Care Provider Office Phone # Fax #    Betty Vazquez [phone]       [place] [address]        Equal Access to Services     MOY MENDES : Hadii aad ku hadasho Soomaali, waaxda luqadaha, qaybta kaalmada adeegyada, waxay idiin hayaan adeeg kharash la'stewart . So [facility] [phone].    ATENCIÓN: Si danae espalanis, tiene a dudley disposición servicios gratuitos de asistencia lingüística. [facility] [phone].    We comply with applicable federal civil rights laws and [place] laws. We do not discriminate on the basis of race, color, national origin, age, disability, sex, sexual orientation, or gender identity.            Thank you!     Thank you for choosing [facility] SOLID ORGAN TRANSPLANT  for your care. Our goal is always to provide you with excellent care. Hearing back from our patients is one way we can continue to improve our services. Please take a few minutes to complete the written survey that you may receive in the mail after your visit with us. Thank you!             Your Updated Medication List - Protect others around you: Learn how to safely use, store and throw away your medicines at www.disposemymeds.org.          This list is accurate as of 6/11/18  1:55 PM.  Always use your most recent med list.                   Brand Name Dispense Instructions for use Diagnosis    calcitRIOL 0.25 MCG capsule    ROCALTROL     Take 0.25 mcg by mouth daily.        lisinopril 40 MG tablet    PRINIVIL/ZESTRIL    30 tablet    Take 1 tablet (40 mg) by mouth daily        metoprolol tartrate 25 MG tablet    LOPRESSOR     Take 25 mg by mouth 2 times daily.        naltrexone 50 MG tablet    DEPADE;REVIA    15 tablet    Take 1/2 Tablet daily    Morbid obesity, unspecified obesity type  (H)       simvastatin 20 MG tablet    ZOCOR     Take 1 tablet by mouth daily.        sodium bicarbonate 650 MG tablet      Take 325 mg by mouth 3 times daily.        traZODone 50 MG tablet    DESYREL     Take 1 tablet by mouth At Bedtime.

## 2018-06-11 NOTE — PROGRESS NOTES
"Kidney Transplant Evaluation - 6/11/2018  Alla Shrestha attended the pre-transplant patient education class today alone. The [facility] website pre-transplant modules were viewed; class participants were educated on using the site.     Content reviewed:    Living Donation and how to access that program    Paired exchange    Kidney Donor Profile Index (KDPI)    Waiting list issues (right to decline without penalty, high PHS risk donors, what to expect when called with an offer)    Hospital experience,  length of stay , need to stay locally post-discharge (2-4 weeks)    Surgical options (with pictures)                             Post-surgery lifting and driving restrictions    Post-transplant routines, frequency of lab work and clinic visits    Need to stay locally post-discharge (2-4 weeks)    Role of Transplant Coordinator    Participants were informed of the benefits of transplant as well as potential risks such as infection, cancer, and death.  The need for total adherence with immunosuppression medications and following transplant regimens was stressed.  The overall evaluation/approval/listing process was reviewed.        The patient was provided with the following documents:  What You Need to Know About a Kidney Transplant  Adult Kidney Transplant - A Guide for Patients  SRTR Data Sheet - Kidney  Brochure - Kidney Allocation  Brochure - Multiple Listing and Waiting Time Transfer  What Every Patient Needs to Know (UNOS)  UNOS Facts and Figures  Finding a Donor  [facility] - Quick Start Guide  KDPI Consent  Receipt of Information form    Alla Shrestha signed the  Receipt of Information for Organ Transplant Recipient.\"  Alla also signed the KDPI > 85 % with a no response to this. She was provided Carolin NICK Siers's business card and instructed to call with additional questions.      Summary    Team s concerns/comments: BMI is 39.5 today - both transplant surgeon and transplant nephrologist " recommend weight loss to BMI of 35 for kidney transplant. Dr. Littlejohn recommends pt proceeds with bilateral aorto-iliac US and RTC with Transplant Surgeon when BMI is 35. Dr. Duran recommends pt initiates dialysis now - plan for Dr. Duran to call primary nephrologist about this. Dr. Duran heard from primary nephrologist today, informed her of need for dialysis and noted that Dr. Capellan states that pt does not always follow all recommendations all the time. No social work concerns.     Candidacy category: RED     Action/Plan: Continue evaluation     Expected Selection Meeting Discussion: 06/20/2018.

## 2018-06-11 NOTE — PROGRESS NOTES
"Psychosocial Assessment  Patient Name/ Age: Alla Shrestha 47 year old   Medical Record #: [id number]  Duration of Interview: 30 min  Process:   Face-to-Face Interview                (counseling < 50%)   Present at Appointment: Alla and her niece Chery        : DALTON Saenz Date:  2018        Type of transplant: Kidney    Donor type: Alla reported her brother and her niece Chery are interested in being donors.      Cadaver, brother, and relative   Prior Transplants:    No Status of Transplant: n/a       Current Living Situation    Location:   [address] [place] [address]  With Whom: lives with their family- daughter Candelaria and niece Rico        Family/ Social Support:    Alla reported she has one daughter, Candelaria (28). Alla has one brother, Ronny, who lives in the [facility]. Alla reported her parents are .  available, helpful   Committed relationship: Alla reported she has been in a relationship with Monik for 30 years.      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    Income   salary/wages   Insurance      At this time, patient can afford medication costs:  Yes  private insurance- [employer] through employer       Medical Status    Current mode of treatment for ESRD none   Complications none       Behavioral    Tobacco Use Yes  Chemical Dependency Yes    Alla reported she currently smokes 6 cigarettes a day. Alla reported she is working on quitting smoking.  Alla reported she drinks \"a couple glasses of wine\" once a month. Per chart review, Alla has a history of alcohol abuse, which she did " "not admit to this writer. Alla reported she has a history of cocaine use 20 years ago. Alla reported she has not used in 20 years. Alla reported she has never been to chemical dependency treatment.      Psychiatric Impairment No  Alla denied any current or history of anxiety, depression, or other mental health concerns.     Reading ability Good  Education level: some college Recent Legal History No      Coping Style/Strategies: Swimming, [facility]       Ability to Adhere to Complex Medical Regime: Yes     Adherence History: Alla reported she follows her physician's recommendations, takes her medications as prescribed, and attends her appointments as scheduled.         Education  _X_ Medicare  _X_ Rehabilitation  _X_ Donor issues  _X_ Community resources  _X_ Post discharge housing  _X_ Financial resources  _X_ Medical insurance options  _X_ Psych adjustment  _X_ Family adjustment  _X_ Health Care Directive No, provided education, okay with daughter   Psychosocial Risks of Transplant Reviewed and Discussed:  _X_ Increased stress related to emotional,            family, social, employment or financial           situation  _X_ Affect on work and/or disability benefits  _X_ Affect on future health and life           insurance  _X_ Transplant outcome expectations may           not be met  _X_ Mental Health Risks: anxiety,           depression, PTSD, guilt, grief and           chronic fatigue     Notable Items:   Alla reported she drinks \"a couple glasses of wine\" once a month. Per chart review, Alla has a history of alcohol abuse, which she did not admit to this writer. Alla reported she has a history of cocaine use 20 years ago. Alla reported she has not used in 20 years. Alla reported she has never been to chemical dependency treatment.       Final Evaluation/Assessment   Patient seemed to process information well. Appeared well informed, motivated and able to follow post transplant " requirements. Behavior was appropriate during interview. Has adequate income and insurance coverage. Adequate social support. No major contraindications noted for transplant.  At this time patient appears to understand the risks and benefits of transplant.      Recommendation  Acceptable however due to Alla's history of alcohol abuse, team may recommend patient have a chemical dependency evaluation for alcohol use.    Selection Criteria Met:  Plan for support Yes   Chemical Dependence Yes   Smoking Yes   Mental Health Yes   Adequate Finances Yes    Signature: DALTON Saenz    Title: Clinical

## 2018-06-11 NOTE — MR AVS SNAPSHOT
After Visit Summary   6/11/2018    Alla Shrestha    MRN: [id number]           Patient Information     Date Of Birth          1970        Visit Information        Provider Department      6/11/2018 10:30 AM  PKE PATIENT 3 [facility] Solid Organ Transplant        Today's Diagnoses     CKD (chronic kidney disease) stage 5, GFR less than 15 ml/min (H)    -  1    Membranous glomerulonephritis        Kidney transplant candidate           Follow-ups after your visit        Your next 10 appointments already scheduled     Jun 11, 2018  2:40 PM CDT   ecg with  CV EKG   [facility] [place] [facility] ([facility])    [address] [facility] [address] [phone]            Jun 11, 2018  3:00 PM CDT   FULL PULMONARY FUNCTION with  PFL D   [facility] Pulmonary Function Testing ([facility])    [address]  [address] [facility] [address] [phone]              Who to contact     If you have questions or need follow up information about today's clinic visit or your schedule please contact [facility] SOLID ORGAN TRANSPLANT directly at [phone].  Normal or non-critical lab and imaging results will be communicated to you by MyChart, letter or phone within 4 business days after the clinic has received the results. If you do not hear from us within 7 days, please contact the clinic through MyChart or phone. If you have a critical or abnormal lab result, we will notify you by phone as soon as possible.  Submit refill requests through [employer] or call your pharmacy and they will forward the refill request to us. Please allow 3 business days for your refill to be completed.          Additional Information About Your Visit        Care EveryWhere ID     This is your Care EveryWhere ID. This could be used by other organizations to access your [place] medical records  [id number]        Your Vitals Were     Pulse  "Temperature Respirations Height Pulse Oximetry BMI (Body Mass Index)    73 97.9  F (36.6  C) 18 1.702 m (5' 7\") 86% 39.72 kg/m2       Blood Pressure from Last 3 Encounters:   06/11/18 (!) 167/95   09/26/17 (!) 177/107   08/03/17 144/88    Weight from Last 3 Encounters:   06/11/18 115 kg (253 lb 9.6 oz)   05/03/18 107 kg (236 lb)   09/26/17 119.8 kg (264 lb 1.6 oz)              Today, you had the following     No orders found for display       Primary Care Provider Office Phone # Fax #    Betty Vazquez [phone]       [facility] [address] [place] [address]        Equal Access to Services     [facility] CINTHYA : Hadiris Garcia, waaxvee lumarina, qaybta kaalmada daniel, carlos arevalo . So [facility] [phone].    ATENCIÓN: Si habla español, tiene a dudley disposición servicios gratuitos de asistencia lingüística. Rosangela al [phone].    We comply with applicable federal civil rights laws and [place] laws. We do not discriminate on the basis of race, color, national origin, age, disability, sex, sexual orientation, or gender identity.            Thank you!     Thank you for choosing [facility] SOLID ORGAN TRANSPLANT  for your care. Our goal is always to provide you with excellent care. Hearing back from our patients is one way we can continue to improve our services. Please take a few minutes to complete the written survey that you may receive in the mail after your visit with us. Thank you!             Your Updated Medication List - Protect others around you: Learn how to safely use, store and throw away your medicines at www.disposemymeds.org.          This list is accurate as of 6/11/18  1:00 PM.  Always use your most recent med list.                   Brand Name Dispense Instructions for use Diagnosis    calcitRIOL 0.25 MCG capsule    ROCALTROL     Take 0.25 mcg by mouth daily.        lisinopril 40 MG tablet    PRINIVIL/ZESTRIL    30 " tablet    Take 1 tablet (40 mg) by mouth daily        metoprolol tartrate 25 MG tablet    LOPRESSOR     Take 25 mg by mouth 2 times daily.        naltrexone 50 MG tablet    DEPADE;REVIA    15 tablet    Take 1/2 Tablet daily    Morbid obesity, unspecified obesity type (H)       simvastatin 20 MG tablet    ZOCOR     Take 1 tablet by mouth daily.        sodium bicarbonate 650 MG tablet      Take 325 mg by mouth 3 times daily.        traZODone 50 MG tablet    DESYREL     Take 1 tablet by mouth At Bedtime.

## 2018-06-11 NOTE — MR AVS SNAPSHOT
After Visit Summary   6/11/2018    Alla Shrestha    MRN: [id number]           Patient Information     Date Of Birth          1970        Visit Information        Provider Department      6/11/2018 11:00 AM MITUL REYES PATIENT 3 [facility] Solid Organ Transplant        Today's Diagnoses     Organ transplant candidate    -  1    Asthma, unspecified asthma severity, unspecified whether complicated, unspecified whether persistent        Dyslipidemia        CKD (chronic kidney disease) stage 5, GFR less than 15 ml/min (H)        Membranous glomerulonephritis        Tobacco abuse           Follow-ups after your visit        Who to contact     If you have questions or need follow up information about today's clinic visit or your schedule please contact [facility] SOLID ORGAN TRANSPLANT directly at [phone].  Normal or non-critical lab and imaging results will be communicated to you by MyChart, letter or phone within 4 business days after the clinic has received the results. If you do not hear from us within 7 days, please contact the clinic through MyChart or phone. If you have a critical or abnormal lab result, we will notify you by phone as soon as possible.  Submit refill requests through [employer] or call your pharmacy and they will forward the refill request to us. Please allow 3 business days for your refill to be completed.          Additional Information About Your Visit        Care EveryWhere ID     This is your Care EveryWhere ID. This could be used by other organizations to access your [place] medical records  [id number]         Blood Pressure from Last 3 Encounters:   06/11/18 (!) 167/95   09/26/17 (!) 177/107   08/03/17 144/88    Weight from Last 3 Encounters:   06/11/18 115 kg (253 lb 9.6 oz)   05/03/18 107 kg (236 lb)   09/26/17 119.8 kg (264 lb 1.6 oz)              Today, you had the following     No orders found for display       Primary Care Provider Office Phone # Fax #    Betty  MD Taylor [phone]       [facility] [address] [address]        Equal Access to Services     MOY MENDES : Hadii aad ku hadmeredithalyse Ayshapascual, wmvee chandleraurelianoha, joan kaerica sanchez, carlos sosa laGenesisstewart sam. So [facility] [phone].    ATENCIÓN: Si habla español, tiene a dudley disposición servicios gratuitos de asistencia lingüística. Llame al [phone].    We comply with applicable federal civil rights laws and [place] laws. We do not discriminate on the basis of race, color, national origin, age, disability, sex, sexual orientation, or gender identity.            Thank you!     Thank you for choosing [facility] SOLID ORGAN TRANSPLANT  for your care. Our goal is always to provide you with excellent care. Hearing back from our patients is one way we can continue to improve our services. Please take a few minutes to complete the written survey that you may receive in the mail after your visit with us. Thank you!             Your Updated Medication List - Protect others around you: Learn how to safely use, store and throw away your medicines at www.disposemymeds.org.          This list is accurate as of 6/11/18  5:33 PM.  Always use your most recent med list.                   Brand Name Dispense Instructions for use Diagnosis    calcitRIOL 0.25 MCG capsule    ROCALTROL     Take 0.25 mcg by mouth daily.        lisinopril 40 MG tablet    PRINIVIL/ZESTRIL    30 tablet    Take 1 tablet (40 mg) by mouth daily        metoprolol tartrate 25 MG tablet    LOPRESSOR     Take 25 mg by mouth 2 times daily.        naltrexone 50 MG tablet    DEPADE;REVIA    15 tablet    Take 1/2 Tablet daily    Morbid obesity, unspecified obesity type (H)       simvastatin 20 MG tablet    ZOCOR     Take 1 tablet by mouth daily.        sodium bicarbonate 650 MG tablet      Take 325 mg by mouth 3 times daily.        traZODone 50 MG tablet    DESYREL     Take 1 tablet by mouth At  Bedtime.

## 2018-06-11 NOTE — LETTER
6/11/2018     RE: Alla Shrestha  [address]  [address]  [place] [address]     Dear Colleague,    Thank you for referring your patient, Alla Shrestha, to the [facility]ID [facility]GAN TRANSPL[facility] at [facility]. Please see a copy of my visit note below.    Transplant Surgery Consult Note     Medical record number: [id number]  YOB: 1970,   Consult requested by Dr. Capellan for evaluation of kidney transplant candidacy.    Assessment and Recommendations:Ms. Shrestha appears to be a fair candidate for kidney transplantation and has a good understanding of the risks and benefits of this approach to the management of renal failure. The following issues should be addressed prior to finalizing her transplant candidacy:     1. Bilateral venous duplex d/t significant lower extremity edema and h/o bilateral knee replacement.  This can be done when she is ready for active status.  2. Bilateral arterial duplex d/t no palpable pulse L side. This can be done when she is ready for active status.  3. Weight loss to BMI ~35 and surgeon visit at that time prior to active status or LD transplant scheduling. It is unclear how much of her weight is water weight, however she does carry a moderate amount of weight centrally and this will need to be reassessed.  4. Given edema and persistent cough in combination with today's O2 Sats and ECHO results, she needs urgent aggressive diuresis or initiation of dialysis.   5. Would repeat ECHO once she is adequately diuresed to better understand her valvular issues and evalute severity of presumed pulmonary hypertension.  6. She should stop smoking, was counselled, and she has a plan. Fantastic!    The majority of our visit was spent in counselling, discussing the medical and surgical risks of kidney transplantation. We discussed approximate wait time and how that is influenced by issues such as blood type and sensitization (PRA) and access to  a living donor. I contrasted potential waiting time for living vs  donor kidneys from  normal (0-85%) or higher (%) kidney donor profile index (KDPI) donors and their associated outcomes. I would not recommend this individual to consider kidneys from high KDPI donors. The reason for this decision is best summarized as: improved long term graft survival. Potential surgical complications of kidney transplantation include bleeding, superficial or deep wound complications (infection, hernia, lymphocele), ureteral anastomotic failure (leak or stenosis), graft thrombosis, need for reoperation and other issues such as cardiac complications, pneumonia, deep venous thrombosis, pulmonary embolism, post transplant diabetes and death. The potential for recurrent disease or need for retransplantation was also addressed. We discussed the possible need for ureteral stent (and subsequent removal), and the utility of protocol biopsy and laboratory studies to evaluate for rejection or recurrent disease. We discussed the risk of graft rejection, our center's average graft and patient survival rates, immunosuppression protocols, as well as the potential opportunity to participate in clinical trials.  We also discussed the average length of stay, recovery process, and posttransplant lab and monitoring protocol.  I emphasized the need for strict immunosuppression medication adherence and the potential for complications of immunosuppression such as skin cancer or lymphoma, as well as a very low but not zero risk of donor-derived disease transmission risks (infection, cancer). Ms. Shrestha asked good questions and her candidacy will be reviewed at our Multidisciplinary Selection Committee. Thank you for the opportunity to participate in Ms. Shrestha's care.    Total time: 30 minutes  Counselling time: 30 minutes            Diaz Littlejohn MD  Department of  "Surgery  ---------------------------------------------------------------------------------------------------    HPI: Ms. Shrestha has chronic renal failure due to Hypertension, diagnosed about 12 years ago. The patient is non-diabetic.       The patient is not on dialysis.    Has potential kidney donors:  Yes .  Interested in participation in paired exchange if a donor is willing: Yes     The patient has the following pertinent history:       No    Yes  Dialysis:    [x]      [] via:       Blood Transfusion                  []      [x]  Number of units: 2 Most recently: ~2006, 5/2018  Pregnancy:    []      [x] Number: 1      Previous Transplant:  [x]      [] Details:    Cancer   [x]      [] Comment:   Kidney stones   [x]      [] Comment:       Recurrent infections  [x]      []  Type:              Making urine?    []      [x] Amount:         Bladder dysfunction  [x]      [] Cause:    Claudication   [x]      [] Distance:    Previous Amputation  [x]      [] Cause:     Personal history of DVT? [x]      [] Location, when?:  Chronic anticoagulation  [x]      [] Indication: warfarin s/p knee replacement  Taoist  [x]      []     Exam:   Vital Signs 6/11/2018   Systolic 167   Diastolic 95   Pulse 73   Temperature 97.9   Respirations 18   Weight (LB) 253 lb 9.6 oz   Height 5' 7\"   BMI (Calculated) 39.8   Pain    O2 86   Abdomen: obese, Liver, spleen, and kidneys not palpably enlarged, Epigastrium not tender and Lower abdomen negative for tenderness and masses   Extremeties: femoral unable to palpate pulses at groin or ankles,    Edema, 3+ bilaterally, L slightly greater than R.  Neuro: without deficit   Resp: occasional audible wheeze and wet sounding cough    Recent Results (from the past 504 hour(s))   Comprehensive metabolic panel [LAB17]    Collection Time: 06/11/18  7:55 AM   Result Value Ref Range    Sodium 140 133 - 144 mmol/L    Potassium 3.9 3.4 - 5.3 mmol/L    Chloride 107 94 - 109 mmol/L    Carbon Dioxide " 17 (L) 20 - 32 mmol/L    Anion Gap 16 (H) 3 - 14 mmol/L    Glucose 87 70 - 99 mg/dL    Urea Nitrogen 109 (H) 7 - 30 mg/dL    Creatinine 13.30 (H) 0.52 - 1.04 mg/dL    GFR Estimate 3 (L) >60 mL/min/1.7m2    GFR Estimate If Black 4 (L) >60 mL/min/1.7m2    Calcium 7.2 (L) 8.5 - 10.1 mg/dL    Bilirubin Total 0.4 0.2 - 1.3 mg/dL    Albumin 3.0 (L) 3.4 - 5.0 g/dL    Protein Total 6.4 (L) 6.8 - 8.8 g/dL    Alkaline Phosphatase 126 40 - 150 U/L    ALT 49 0 - 50 U/L    AST 43 0 - 45 U/L   Cardiolipin Samara IgG and IgM [GER3231]    Collection Time: 06/11/18  7:55 AM   Result Value Ref Range    Cardiolipin Antibody IgG <1.6 0.0 - 19.9 GPL-U/mL    Cardiolipin Antibody IgM 1.1 0.0 - 19.9 MPL-U/mL   HCG qualitative [MKR827]    Collection Time: 06/11/18  7:55 AM   Result Value Ref Range    HCG Qualitative Serum Negative NEG^Negative   INR [IQQ2598]    Collection Time: 06/11/18  7:55 AM   Result Value Ref Range    INR 1.13 0.86 - 1.14   Partial thromboplastin time [LAB56]    Collection Time: 06/11/18  7:55 AM   Result Value Ref Range    PTT 30 22 - 37 sec   Thrombin time [ORR577]    Collection Time: 06/11/18  7:55 AM   Result Value Ref Range    Thrombin Time 17.3 13.0 - 19.0 sec   CBC with platelets differential [NZP905]    Collection Time: 06/11/18  7:55 AM   Result Value Ref Range    WBC 7.2 4.0 - 11.0 10e9/L    RBC Count 2.96 (L) 3.8 - 5.2 10e12/L    Hemoglobin 9.6 (L) 11.7 - 15.7 g/dL    Hematocrit 32.1 (L) 35.0 - 47.0 %     (H) 78 - 100 fl    MCH 32.4 26.5 - 33.0 pg    MCHC 29.9 (L) 31.5 - 36.5 g/dL    RDW 15.9 (H) 10.0 - 15.0 %    Platelet Count 173 150 - 450 10e9/L    Diff Method Automated Method     % Neutrophils 65.4 %    % Lymphocytes 24.4 %    % Monocytes 7.0 %    % Eosinophils 2.5 %    % Basophils 0.4 %    % Immature Granulocytes 0.3 %    Nucleated RBCs 0 0 /100    Absolute Neutrophil 4.7 1.6 - 8.3 10e9/L    Absolute Lymphocytes 1.8 0.8 - 5.3 10e9/L    Absolute Monocytes 0.5 0.0 - 1.3 10e9/L    Absolute Eosinophils  0.2 0.0 - 0.7 10e9/L    Absolute Basophils 0.0 0.0 - 0.2 10e9/L    Abs Immature Granulocytes 0.0 0 - 0.4 10e9/L    Absolute Nucleated RBC 0.0    CMV Antibody IgG [[id number]]    Collection Time: 06/11/18  7:55 AM   Result Value Ref Range    CMV Antibody IgG >8.0 (H) 0.0 - 0.8 AI   EBV Capsid Antibody IgG [DPX7816]    Collection Time: 06/11/18  7:55 AM   Result Value Ref Range    EBV Capsid Antibody IgG 7.9 (H) 0.0 - 0.8 AI   Treponema Abs w Reflex to RPR and Titer [[id number]]    Collection Time: 06/11/18  7:55 AM   Result Value Ref Range    Treponema Antibodies Nonreactive NR^Nonreactive   Varicella Zoster Virus Antibody IgG [[id number]]    Collection Time: 06/11/18  7:55 AM   Result Value Ref Range    Varicella Zoster Virus Antibody IgG 5.5 (H) 0.0 - 0.8 AI   ABO/Rh type and screen [[id number]]    Collection Time: 06/11/18  7:56 AM   Result Value Ref Range    ABO PENDING     Antibody Screen PENDING     Test Valid Only At          [facility]    Specimen Expires 06/14/2018    ABO Subtyping [[id number]]    Collection Time: 06/11/18  7:56 AM   Result Value Ref Range    Antigen Type Canceled, Test credited     Blood Bank Comment       Canceled, Test credited  TESTING NOT INDICATED. PATIENT IS TYPE O     ABO type [[id number]]    Collection Time: 06/11/18  8:01 AM   Result Value Ref Range    ABO O     RH(D) Pos     Specimen Expires 06/14/2018    Routine UA with microscopic [[id number]]    Collection Time: 06/11/18 10:29 AM   Result Value Ref Range    Color Urine Yellow     Appearance Urine Slightly Cloudy     Glucose Urine 150 (A) NEG^Negative mg/dL    Bilirubin Urine Negative NEG^Negative    Ketones Urine Negative NEG^Negative mg/dL    Specific Gravity Urine 1.011 1.003 - 1.035    Blood Urine Small (A) NEG^Negative    pH Urine 6.0 5.0 - 7.0 pH    Protein Albumin Urine >499 (A) NEG^Negative mg/dL    Urobilinogen mg/dL 0.0 0.0 - 2.0 mg/dL    Nitrite Urine Negative NEG^Negative    Leukocyte Esterase  Urine Small (A) NEG^Negative    Source Midstream Urine     WBC Urine 27 (H) 0 - 5 /HPF    RBC Urine 7 (H) 0 - 2 /HPF    Bacteria Urine Few (A) NEG^Negative /HPF    Squamous Epithelial /HPF Urine 1 0 - 1 /HPF   General PFT Lab (Please always keep checked)    Collection Time: 06/11/18  1:59 PM   Result Value Ref Range    FVC-Pred 3.59 L    FVC-Pre 2.09 L    FVC-%Pred-Pre 58 %    FEV1-Pre 1.47 L    FEV1-%Pred-Pre 50 %    FEV1FVC-Pred 81 %    FEV1FVC-Pre 71 %    FEFMax-Pred 7.28 L/sec    FEFMax-Pre 3.87 L/sec    FEFMax-%Pred-Pre 53 %    FEF2575-Pred 2.87 L/sec    FEF2575-Pre 1.03 L/sec    TJY2868-%Pred-Pre 36 %    FEF2575-Post 1.06 L/sec    ZTK5342-%Pred-Post 36 %    ExpTime-Pre 6.13 sec    FIFMax-Pre 3.44 L/sec    MVV-Pred 105 L/min    MVV-Pre 55 L/min    MVV-%Pred-Pre 52 %    VC-Pred 3.91 L    VC-Pre 2.19 L    VC-%Pred-Pre 56 %    IC-Pred 3.35 L    IC-Pre 1.91 L    IC-%Pred-Pre 57 %    ERV-Pred 0.56 L    ERV-Pre 0.28 L    ERV-%Pred-Pre 50 %    FEV1FEV6-Pred 83 %    FEV1FEV6-Pre 71 %    FRCPleth-Pred 2.86 L    FRCPleth-Pre 4.03 L    FRCPleth-%Pred-Pre 140 %    RVPleth-Pred 1.84 L    RVPleth-Pre 3.75 L    RVPleth-%Pred-Pre 203 %    TLCPleth-Pred 5.44 L    TLCPleth-Pre 5.94 L    TLCPleth-%Pred-Pre 109 %    DLCOunc-Pred 24.22 ml/min/mmHg    DLCOunc-Pre 23.00 ml/min/mmHg    DLCOunc-%Pred-Pre 94 %    DLCOcor-Pre 26.74 ml/min/mmHg    DLCOcor-%Pred-Pre 110 %    VA-Pre 4.20 L    VA-%Pred-Pre 75 %    FEV1SVC-Pred 74 %    FEV1SVC-Pre 67 %   EKG 12-lead, tracing only [EKG1]    Collection Time: 06/11/18  2:31 PM   Result Value Ref Range    Interpretation ECG Click View Image link to view waveform and result      Exam: XR CHEST 2 VW, 6/11/2018 7:12 AM     Indication: ; Chronic renal failure, stage 5 (H); History of tobacco  use; Nephritis and nephropathy, with membranoproliferative  glomerulonephritis; Organ transplant candidate; Essential  hypertension; Hyperlipidemia     Comparison: None     Findings:   PA and lateral views of  the chest. Cardiac silhouette is enlarged.  Pulmonary vasculature is distinct with mild perihilar opacities.  Prominent interstitial lung markings. Few curly B-lines in the lateral  aspects of the lower lobes. No pleural effusion or pneumothorax. Upper  abdomen is unremarkable.         Impression: Mild interstitial pulmonary edema.     I have personally reviewed the examination and initial interpretation  and I agree with the findings.     EDA CONNORS MD      Reading Physician Reading Date Result Priority     JESE Delgado MD 2018          Narrative           [id number]  [id number]  [id number]^[email or web address]^SIM     [facility]  Diagnostic and Treamtent-3rd Floor  [address] [place] [address]     Name: NATALIE DUNN  MRN: [id number]  : 1970  Study Date: 2018 01:21 PM  Age: 47 yrs  Gender: Female  [place] [facility] [place] For Study: Pre Kidney Trans[place]  Ordering Physician: KIP MUNGUIA  Referring Physician: KIP MUNGUIA  Performed By: [facility] Zunilda Fiore     BSA: 2.2 m2  Height: 67 in  Weight: 253 lb  BP: 167/95 mmHg  __  Procedure  Echocardiogram with two-dimensional, color and spectral Doppler performed.    Interpretation Summary  Global and regional left ventricular function is normal with an EF of 60-65%.  Mild right ventricular dilation is present.  Global right ventricular function is normal.  Moderate to severe mitral insufficiency is present.  Moderate tricuspid insufficiency is present.  Right ventricular systolic pressure is 61mmHg above the right atrial pressure.  Dilation of the inferior vena cava is present with abnormal respiratory  variation in diameter.  Previous study not available for comparison.  _  Left Ventricle  Global and regional left ventricular function is normal with an EF of 60-65%.  Left ventricular size is normal. Mild concentric wall thickening consistent  with left ventricular  hypertrophy is present. Grade III or advanced diastolic  dysfunction. No regional wall motion abnormalities are seen.     Right Ventricle  Global right ventricular function is normal. Mild right ventricular dilation  is present.     Atria  The left atrium appears normal. Moderate to severe right atrial enlargement is  present.     Mitral Valve  Mild mitral annular calcification is present. Moderate to severe mitral  insufficiency is present.     Aortic Valve  Trace aortic insufficiency is present.     Tricuspid Valve  Moderate tricuspid insufficiency is present. Right ventricular systolic  pressure is 61mmHg above the right atrial pressure.     Pulmonic Valve  The pulmonic valve is normal. Mild pulmonic insufficiency is present.     Vessels  The aorta root is normal. The pulmonary artery is normal. Dilation of the  inferior vena cava is present with abnormal respiratory variation in diameter.     Pericardium  No pericardial effusion is present.        Compared to Previous Study  Previous study not available for comparison.  _________________________________________________________________________  __  MMode/2D Measurements & Calculations     IVSd: 1.2 cm  LVIDd: 6.1 cm  LVIDs: 4.4 cm  LVPWd: 1.2 cm  FS: 27.8 %  LV mass(C)d: 322.6 grams  LV mass(C)dI: 144.3 grams/m2  Ao root diam: 3.3 cm  asc Aorta Diam: 3.5 cm  LVOT diam: 2.3 cm  LVOT area: 4.1 cm2  LA Volume (BP): 76.5 ml  LA Volume Index (BP): 34.3 ml/m2  RWT: 0.38     Doppler Measurements & Calculations  MV E max mikhail: 124.0 cm/sec  MV A max mikhail: 51.4 cm/sec  MV E/A: 2.4  MV max P.1 mmHg  MV mean P.7 mmHg  MV V2 VTI: 39.7 cm  MV dec time: 0.14 sec  PA V2 max: 115.4 cm/sec  PA max P.3 mmHg  PA acc time: 0.10 sec  TR max mikhail: 389.1 cm/sec  TR max P.6 mmHg  E/E' av.9  Lateral E/e': 17.5  Medial E/e': 22.3     Report approved by: Antwan Brewer 2018 03:19 PM                  Again, thank you for allowing me to participate in the care of  your patient.      Sincerely,    AMY

## 2018-06-11 NOTE — LETTER
6/11/2018       RE: Alla Shrestha  [address]  [address]  [place] [address]     Dear Colleague,    Thank you for referring your patient, Alla Shrestha, to the [facility]ID [facility]GAN TRANSPL[facility] at [facility]. Please see a copy of my visit note below.    Assessment and Plan:  1. Kidney transplant evaluation - patient is not a transplant candidate at this time due to her obesity.  However, should she become a candidate, she would have some medical issues to address. Benefits of a living donor transplant were discussed.  2. 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  Patient would benefit from a kidney transplant, preferably a living donor kidney transplant.  3. HTN - not well controlled, likely due to volume overload.  Will refer patient back to primary nephrologist to manage.  4. Morbid obesity - patient has a BMI of ~ 40, although likely some of this is due to volume overload as she has gained ~ 18 lbs in the last month.  To meet a BMI guideline of 35 or less, patient would need to be at less than 225 lbs.  She also has significant truncal obesity and encouraged her to increase exercise and watch caloric intake.  Patient may benefit from reinitiating her care with Weight Management Program.  5. Cardiac risk - patient will require Cardiology evaluation prior to transplant.  6. H/o alcohol abuse - patient has a significant history of alcohol abuse, including DUI and extended long term time.  She reports going to AA meetings, except she also continues to drink at least once a month.  Her previous alcohol issues reportedly centered around stress and would recommend CD evaluation as post transplant complications may put patient at risk of further alcohol abuse.  7. Tobacco abuse - strongly encouraged  patient to undergo smoking cessation.  She will require PFTs prior to transplantation.  8. Pulmonary rhonchi, interstitial pulmonary edema on CXR and LE edema - patient is volume overloaded and would likely due will with starting dialysis.  Discussed patient with her primary nephrologist who will work on getting her in for evaluation this week.  9. Anemia - certainly patient's underlying kidney dysfunction is playing a role, but would like to see patient's initial Hematology evaluation to ensure nothing else is contributing.  Patient is also iron deficient and would consider further evaluation, such as colonoscopy to rule GI losses.  10. Medical noncompliance - patient reportedly has missed several follow up visits with her nephrologist and with her intermittent care in the past, would recommend a compliance contract.    Discussed the risks and benefits of a transplant, including the risk of surgery and immunosuppression medications.  Patients overall evaluation will be discussed in the Transplant Program's regular meeting with a final recommendation on the patients suitability for transplant to be made at that time.    Consult:  Alla Shrestha was seen in consultation at the request of Dr. Diaz Littlejohn for evaluation as a potential kidney transplant recipient.    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    HPI:  Patient reports feeling okay overall with some medical complaints.  Patient reports first presenting in 2009 while living in [place] with lower extremity edema and also found to have hematuria, abnormal kidney function and hypertension.  Patient underwent native kidney biopsy and was diagnosed with membranous glomerulonephritis.  She was reportedly treated with oral steroids for about 6 months and denies any receiving cyclophosphamide.  Patient says she had no improvement in her kidney disease  "during her treatment.  During the time the patient was on steroids, her mother  and patient had a lot of stress.  She increased her alcohol drinking from 3 days a week to daily use.  Patient was then arrested for DUI and spent about 9 months in custodial.  She says they refused to refer her to a nephrologist during her time in custodial, so her treatment stopped.  After getting out of custodial, patient moved back to [place] and started seeing Nephrology again.  Her kidney function at this time was low and she had an AV fistula placed for possible dialysis.  However, with better blood pressure management, her kidney function stabilized and has not progressed much over the last 5 or so years outside of a recent slow progression.  After changing nephrologists, patient was referred for kidney transplantation just over a year ago, but due to her morbid obesity, she was instructed to lose weight first.  Patient was seen in [facility] and started that process, but then stopped after having to undergo bilateral total knee replacements last .  Now that she has been more active, she has lost some weight and was rereferred for transplant evaluation.    Patient has a history of morbid obesity with her weight up to a maximum of ~ 322 lbs in  with a BMI of 50.  She reportedly was about 300 lbs last year when she started her weight loss program and got down to a low of ~ 236 lbs just over a month ago, but is now back up ~ 18 lbs in the last month and has a BMI still ~ 40.  Other significant past medical issues include anemia since her diagnosis with kidney issues.  Patient is following with Hematology and has been on ANNY for the last 7-8 years.  She is iron deficient, although her anemia is felt to be due to her kidney disease.  Patient doesn't believe she has had a bone marrow biopsy.    In addition to patient's previous alcohol abuse history, she continues to drink 1-2 glasses of wine \"about once a month.\"  She " "does attend AA meetings, but also was found to have an episode of acute pancreatitis several years ago.    Talked to patient's nephrologist about her worsening kidney function and her nephrologist reported that the patient hasn't been as compliant with her medical appointments with several \"no shows.\"         Kidney Disease Hx:        Kidney Disease Dx: membranous glomerulonephritis       Biopsy Proven: Yes; 2009         On Dialysis: No       Primary Nephrologist: Dr. Timi Sotelo Hx:       h/o HTN: Yes  Usual BP: 150/80-90s       h/o DM:  No       h/o Protein in Urine: Yes        h/o Blood in Urine:  No       h/o Kidney Stones:  No       h/o UTI: No       h/o Chronic NSAID Use: No         Previous Transplant Hx:       No         Transplant Sensitization Hx:       Previous Tx: No       Blood Transfusion: Yes       Pregnancy: Yes         Uremic Symptoms:       Fatigue: Yes; Cold: No; Nausea: No; Poor Appetite: No; Metallic Taste: No; Edema: Yes;         Cardiovascular Hx:       h/o Cardiac Issues: No       Exercise Tolerance: shortness of breath with exertion.         Health Maintenance:       Colonoscopy: Not indicated, Mammogram: Up to date and PAP: Not up to date         Potential Donor(s): Yes; brother    ROS:  A comprehensive review of systems was obtained and negative, except as noted in the HPI or PMH.    PMH:   Medical records were obtained and reviewed  and PMH was discussed with patient and noted below.  Past Medical History:   Diagnosis Date     Alcohol abuse      Anemia in chronic renal disease      Asthma      CKD (chronic kidney disease) stage 5, GFR less than 15 ml/min (H)     Stage IV     Dyslipidemia      Gout      History of blood transfusion 2018    [facility]     Hypertension, renal      Iron deficiency      Membranous glomerulonephritis 2009     Metabolic acidosis      Nephrotic syndrome with membranous glomerulonephritis      Obesity      Osteoarthritis     Knees     Pancreatitis     " pt not aware of this diagnosis     Secondary renal hyperparathyroidism (H)      Tobacco abuse      Vitamin D deficiency        PSH:   Past Surgical History:   Procedure Laterality Date     BIOPSY  2006    Kidney @ [facility] in [place], [place]     C TOTAL KNEE ARTHROPLASTY Bilateral 2017    TKA     CREATE FISTULA ARTERIOVENOUS UPPER EXTREMITY Right 2008     partial hysterectomy  1998    [facility] in [place], [place]     Personal or family history of bleeding or anesthesia problems: No    Family Hx:  Family History   Problem Relation Age of Onset     Hypertension Mother      Lung Cancer Mother      Chronic Obstructive Pulmonary Disease Mother      Coronary Artery Disease Father      Hypertension Father      CEREBROVASCULAR DISEASE Maternal Grandmother      Other Cancer Maternal Grandfather      Coronary Artery Disease Paternal Grandfather      Hypertension Paternal Grandfather      Bipolar Disorder Brother      Chronic Obstructive Pulmonary Disease Brother        Personal Hx:   Social History     Social History     Marital status: Single     Spouse name: N/A     Number of children: 1     Years of education: N/A     Occupational History     Not on file.     Social History Main Topics     Smoking status: Current Every Day Smoker     Types: Cigarettes     Smokeless tobacco: Never Used      Comment: 6 cigs /day     Alcohol use No      Comment: h/o alcohol abuse with previous DUI conviction     Drug use: No     Sexual activity: Yes     Other Topics Concern     Not on file     Social History Narrative       Allergies:  Allergies   Allergen Reactions     Penicillins        Medications:  Prior to Admission medications    Medication Sig Start Date End Date Taking? Authorizing Provider   calcitRIOL (ROCALTROL) 0.25 MCG capsule Take 0.25 mcg by mouth daily.   Yes Reported, Patient   metoprolol (LOPRESSOR) 25 MG tablet Take 25 mg by mouth 2 times daily.   Yes Reported, Patient   naltrexone (DEPADE;REVIA) 50 MG tablet Take 1/2 Tablet daily  "10/29/17  Yes Maggie Natarajan PA-C   simvastatin (ZOCOR) 20 MG tablet Take 1 tablet by mouth daily.   Yes Reported, Patient   sodium bicarbonate 650 MG tablet Take 325 mg by mouth 3 times daily.   Yes Reported, Patient   traZODone (DESYREL) 50 MG tablet Take 1 tablet by mouth At Bedtime.   Yes Reported, Patient   lisinopril (PRINIVIL,ZESTRIL) 40 MG tablet Take 1 tablet (40 mg) by mouth daily 4/29/14   Andre Smith MD       Vitals:  BP (!) 167/95 (Patient Position: Sitting)  Pulse 73  Temp 97.9  F (36.6  C)  Resp 18  Ht 1.702 m (5' 7\")  Wt 115 kg (253 lb 9.6 oz)  SpO2 (!) 86%  BMI 39.72 kg/m2    Exam:  GENERAL APPEARANCE: alert and no distress  HENT: mouth without ulcers or lesions  LYMPHATICS: no cervical or supraclavicular nodes  RESP: diffuse, coarse rhonchi - no rales or wheezes  CV: regular rhythm, normal rate, no rub, no murmur  EDEMA: no LE edema bilaterally  ABDOMEN: soft, nondistended, nontender, bowel sounds normal, obese  MS: extremities normal - no gross deformities noted, no evidence of inflammation in joints, no muscle tenderness; right upper extremity AV fistula with good thrill  SKIN: no rash    Results:   Labs and imaging were ordered for this visit and reviewed by me.  Recent Results (from the past 336 hour(s))   Comprehensive metabolic panel [LAB17]    Collection Time: 06/11/18  7:55 AM   Result Value Ref Range    Sodium 140 133 - 144 mmol/L    Potassium 3.9 3.4 - 5.3 mmol/L    Chloride 107 94 - 109 mmol/L    Carbon Dioxide 17 (L) 20 - 32 mmol/L    Anion Gap 16 (H) 3 - 14 mmol/L    Glucose 87 70 - 99 mg/dL    Urea Nitrogen 109 (H) 7 - 30 mg/dL    Creatinine 13.30 (H) 0.52 - 1.04 mg/dL    GFR Estimate 3 (L) >60 mL/min/1.7m2    GFR Estimate If Black 4 (L) >60 mL/min/1.7m2    Calcium 7.2 (L) 8.5 - 10.1 mg/dL    Bilirubin Total 0.4 0.2 - 1.3 mg/dL    Albumin 3.0 (L) 3.4 - 5.0 g/dL    Protein Total 6.4 (L) 6.8 - 8.8 g/dL    Alkaline Phosphatase 126 40 - 150 U/L    ALT 49 0 - 50 " U/L    AST 43 0 - 45 U/L   Cardiolipin Samara IgG and IgM [[id number]]    Collection Time: 06/11/18  7:55 AM   Result Value Ref Range    Cardiolipin Antibody IgG <1.6 0.0 - 19.9 GPL-U/mL    Cardiolipin Antibody IgM 1.1 0.0 - 19.9 MPL-U/mL   HCG qualitative [[id number]]    Collection Time: 06/11/18  7:55 AM   Result Value Ref Range    HCG Qualitative Serum Negative NEG^Negative   INR [[id number]]    Collection Time: 06/11/18  7:55 AM   Result Value Ref Range    INR 1.13 0.86 - 1.14   Partial thromboplastin time [LAB56]    Collection Time: 06/11/18  7:55 AM   Result Value Ref Range    PTT 30 22 - 37 sec   CBC with platelets differential [[id number]]    Collection Time: 06/11/18  7:55 AM   Result Value Ref Range    WBC 7.2 4.0 - 11.0 10e9/L    RBC Count 2.96 (L) 3.8 - 5.2 10e12/L    Hemoglobin 9.6 (L) 11.7 - 15.7 g/dL    Hematocrit 32.1 (L) 35.0 - 47.0 %     (H) 78 - 100 fl    MCH 32.4 26.5 - 33.0 pg    MCHC 29.9 (L) 31.5 - 36.5 g/dL    RDW 15.9 (H) 10.0 - 15.0 %    Platelet Count 173 150 - 450 10e9/L    Diff Method Automated Method     % Neutrophils 65.4 %    % Lymphocytes 24.4 %    % Monocytes 7.0 %    % Eosinophils 2.5 %    % Basophils 0.4 %    % Immature Granulocytes 0.3 %    Nucleated RBCs 0 0 /100    Absolute Neutrophil 4.7 1.6 - 8.3 10e9/L    Absolute Lymphocytes 1.8 0.8 - 5.3 10e9/L    Absolute Monocytes 0.5 0.0 - 1.3 10e9/L    Absolute Eosinophils 0.2 0.0 - 0.7 10e9/L    Absolute Basophils 0.0 0.0 - 0.2 10e9/L    Abs Immature Granulocytes 0.0 0 - 0.4 10e9/L    Absolute Nucleated RBC 0.0    CMV Antibody IgG [[id number]]    Collection Time: 06/11/18  7:55 AM   Result Value Ref Range    CMV Antibody IgG >8.0 (H) 0.0 - 0.8 AI   EBV Capsid Antibody IgG [[id number]]    Collection Time: 06/11/18  7:55 AM   Result Value Ref Range    EBV Capsid Antibody IgG 7.9 (H) 0.0 - 0.8 AI   Treponema Abs w Reflex to RPR and Titer [[id number]]    Collection Time: 06/11/18  7:55 AM   Result Value Ref Range    Treponema Antibodies Nonreactive  NR^Nonreactive   Varicella Zoster Virus Antibody IgG [[id number]]    Collection Time: 06/11/18  7:55 AM   Result Value Ref Range    Varicella Zoster Virus Antibody IgG 5.5 (H) 0.0 - 0.8 AI   ABO/Rh type and screen [[id number]]    Collection Time: 06/11/18  7:56 AM   Result Value Ref Range    ABO PENDING     Antibody Screen PENDING     Test Valid Only At          [facility]    Specimen Expires 06/14/2018    ABO Subtyping [[id number]]    Collection Time: 06/11/18  7:56 AM   Result Value Ref Range    Antigen Type Canceled, Test credited     Blood Bank Comment       Canceled, Test credited  TESTING NOT INDICATED. PATIENT IS TYPE O     ABO type [[id number]]    Collection Time: 06/11/18  8:01 AM   Result Value Ref Range    ABO O     RH(D) Pos     Specimen Expires 06/14/2018    Routine UA with microscopic [[id number]]    Collection Time: 06/11/18 10:29 AM   Result Value Ref Range    Color Urine Yellow     Appearance Urine Slightly Cloudy     Glucose Urine 150 (A) NEG^Negative mg/dL    Bilirubin Urine Negative NEG^Negative    Ketones Urine Negative NEG^Negative mg/dL    Specific Gravity Urine 1.011 1.003 - 1.035    Blood Urine Small (A) NEG^Negative    pH Urine 6.0 5.0 - 7.0 pH    Protein Albumin Urine >499 (A) NEG^Negative mg/dL    Urobilinogen mg/dL 0.0 0.0 - 2.0 mg/dL    Nitrite Urine Negative NEG^Negative    Leukocyte Esterase Urine Small (A) NEG^Negative    Source Midstream Urine     WBC Urine 27 (H) 0 - 5 /HPF    RBC Urine 7 (H) 0 - 2 /HPF    Bacteria Urine Few (A) NEG^Negative /HPF    Squamous Epithelial /HPF Urine 1 0 - 1 /HPF       Again, thank you for allowing me to participate in the care of your patient.      Sincerely,    Juliocesar Duran MD

## 2018-06-11 NOTE — PROGRESS NOTES
Transplant Surgery Consult Note     Medical record number: [id number]  YOB: 1970,   Consult requested by Dr. Capellan for evaluation of kidney transplant candidacy.    Assessment and Recommendations:Ms. Shrestha appears to be a fair candidate for kidney transplantation and has a good understanding of the risks and benefits of this approach to the management of renal failure. The following issues should be addressed prior to finalizing her transplant candidacy:     1. Bilateral venous duplex d/t significant lower extremity edema and h/o bilateral knee replacement.  This can be done when she is ready for active status.  2. Bilateral arterial duplex d/t no palpable pulse L side. This can be done when she is ready for active status.  3. Weight loss to BMI ~35 and surgeon visit at that time prior to active status or LD transplant scheduling. It is unclear how much of her weight is water weight, however she does carry a moderate amount of weight centrally and this will need to be reassessed.  4. Given edema and persistent cough in combination with today's O2 Sats and ECHO results, she needs urgent aggressive diuresis or initiation of dialysis.   5. Would repeat ECHO once she is adequately diuresed to better understand her valvular issues and evalute severity of presumed pulmonary hypertension.  6. She should stop smoking, was counselled, and she has a plan. Fantastic!    The majority of our visit was spent in counselling, discussing the medical and surgical risks of kidney transplantation. We discussed approximate wait time and how that is influenced by issues such as blood type and sensitization (PRA) and access to a living donor. I contrasted potential waiting time for living vs  donor kidneys from  normal (0-85%) or higher (%) kidney donor profile index (KDPI) donors and their associated outcomes. I would not recommend this individual to consider kidneys from high KDPI donors. The reason for  this decision is best summarized as: improved long term graft survival. Potential surgical complications of kidney transplantation include bleeding, superficial or deep wound complications (infection, hernia, lymphocele), ureteral anastomotic failure (leak or stenosis), graft thrombosis, need for reoperation and other issues such as cardiac complications, pneumonia, deep venous thrombosis, pulmonary embolism, post transplant diabetes and death. The potential for recurrent disease or need for retransplantation was also addressed. We discussed the possible need for ureteral stent (and subsequent removal), and the utility of protocol biopsy and laboratory studies to evaluate for rejection or recurrent disease. We discussed the risk of graft rejection, our center's average graft and patient survival rates, immunosuppression protocols, as well as the potential opportunity to participate in clinical trials.  We also discussed the average length of stay, recovery process, and posttransplant lab and monitoring protocol.  I emphasized the need for strict immunosuppression medication adherence and the potential for complications of immunosuppression such as skin cancer or lymphoma, as well as a very low but not zero risk of donor-derived disease transmission risks (infection, cancer). Ms. Shrestha asked good questions and her candidacy will be reviewed at our Multidisciplinary Selection Committee. Thank you for the opportunity to participate in Ms. Shrestha's care.    Total time: 30 minutes  Counselling time: 30 minutes            Diaz Littlejohn MD  Department of Surgery  ---------------------------------------------------------------------------------------------------    HPI: Ms. Shrestha has chronic renal failure due to Hypertension, diagnosed about 12 years ago. The patient is non-diabetic.       The patient is not on dialysis.    Has potential kidney donors:  Yes .  Interested in participation in paired exchange if a donor is  "willing: Yes     The patient has the following pertinent history:       No    Yes  Dialysis:    [x]      [] via:       Blood Transfusion                  []      [x]  Number of units: 2 Most recently: ~2006, 5/2018  Pregnancy:    []      [x] Number: 1      Previous Transplant:  [x]      [] Details:    Cancer   [x]      [] Comment:   Kidney stones   [x]      [] Comment:       Recurrent infections  [x]      []  Type:              Making urine?    []      [x] Amount:         Bladder dysfunction  [x]      [] Cause:    Claudication   [x]      [] Distance:    Previous Amputation  [x]      [] Cause:     Personal history of DVT? [x]      [] Location, when?:  Chronic anticoagulation  [x]      [] Indication: warfarin s/p knee replacement  [employer]  [x]      []     Exam:   Vital Signs 6/11/2018   Systolic 167   Diastolic 95   Pulse 73   Temperature 97.9   Respirations 18   Weight (LB) 253 lb 9.6 oz   Height 5' 7\"   BMI (Calculated) 39.8   Pain    O2 86   Abdomen: obese, Liver, spleen, and kidneys not palpably enlarged, Epigastrium not tender and Lower abdomen negative for tenderness and masses   Extremeties: femoral unable to palpate pulses at groin or ankles,    Edema, 3+ bilaterally, L slightly greater than R.  Neuro: without deficit   Resp: occasional audible wheeze and wet sounding cough    Recent Results (from the past 504 hour(s))   Comprehensive metabolic panel [LAB17]    Collection Time: 06/11/18  7:55 AM   Result Value Ref Range    Sodium 140 133 - 144 mmol/L    Potassium 3.9 3.4 - 5.3 mmol/L    Chloride 107 94 - 109 mmol/L    Carbon Dioxide 17 (L) 20 - 32 mmol/L    Anion Gap 16 (H) 3 - 14 mmol/L    Glucose 87 70 - 99 mg/dL    Urea Nitrogen 109 (H) 7 - 30 mg/dL    Creatinine 13.30 (H) 0.52 - 1.04 mg/dL    GFR Estimate 3 (L) >60 mL/min/1.7m2    GFR Estimate If Black 4 (L) >60 mL/min/1.7m2    Calcium 7.2 (L) 8.5 - 10.1 mg/dL    Bilirubin Total 0.4 0.2 - 1.3 mg/dL    Albumin 3.0 (L) 3.4 - 5.0 g/dL    Protein " Total 6.4 (L) 6.8 - 8.8 g/dL    Alkaline Phosphatase 126 40 - 150 U/L    ALT 49 0 - 50 U/L    AST 43 0 - 45 U/L   Cardiolipin Samara IgG and IgM [HEX7988]    Collection Time: 06/11/18  7:55 AM   Result Value Ref Range    Cardiolipin Antibody IgG <1.6 0.0 - 19.9 GPL-U/mL    Cardiolipin Antibody IgM 1.1 0.0 - 19.9 MPL-U/mL   HCG qualitative [AED389]    Collection Time: 06/11/18  7:55 AM   Result Value Ref Range    HCG Qualitative Serum Negative NEG^Negative   INR [LQL7755]    Collection Time: 06/11/18  7:55 AM   Result Value Ref Range    INR 1.13 0.86 - 1.14   Partial thromboplastin time [LAB56]    Collection Time: 06/11/18  7:55 AM   Result Value Ref Range    PTT 30 22 - 37 sec   Thrombin time [PAD033]    Collection Time: 06/11/18  7:55 AM   Result Value Ref Range    Thrombin Time 17.3 13.0 - 19.0 sec   CBC with platelets differential [LZU733]    Collection Time: 06/11/18  7:55 AM   Result Value Ref Range    WBC 7.2 4.0 - 11.0 10e9/L    RBC Count 2.96 (L) 3.8 - 5.2 10e12/L    Hemoglobin 9.6 (L) 11.7 - 15.7 g/dL    Hematocrit 32.1 (L) 35.0 - 47.0 %     (H) 78 - 100 fl    MCH 32.4 26.5 - 33.0 pg    MCHC 29.9 (L) 31.5 - 36.5 g/dL    RDW 15.9 (H) 10.0 - 15.0 %    Platelet Count 173 150 - 450 10e9/L    Diff Method Automated Method     % Neutrophils 65.4 %    % Lymphocytes 24.4 %    % Monocytes 7.0 %    % Eosinophils 2.5 %    % Basophils 0.4 %    % Immature Granulocytes 0.3 %    Nucleated RBCs 0 0 /100    Absolute Neutrophil 4.7 1.6 - 8.3 10e9/L    Absolute Lymphocytes 1.8 0.8 - 5.3 10e9/L    Absolute Monocytes 0.5 0.0 - 1.3 10e9/L    Absolute Eosinophils 0.2 0.0 - 0.7 10e9/L    Absolute Basophils 0.0 0.0 - 0.2 10e9/L    Abs Immature Granulocytes 0.0 0 - 0.4 10e9/L    Absolute Nucleated RBC 0.0    CMV Antibody IgG [[id number]]    Collection Time: 06/11/18  7:55 AM   Result Value Ref Range    CMV Antibody IgG >8.0 (H) 0.0 - 0.8 AI   EBV Capsid Antibody IgG [[id number]]    Collection Time: 06/11/18  7:55 AM   Result Value Ref  Range    EBV Capsid Antibody IgG 7.9 (H) 0.0 - 0.8 AI   Treponema Abs w Reflex to RPR and Titer [[id number]]    Collection Time: 06/11/18  7:55 AM   Result Value Ref Range    Treponema Antibodies Nonreactive NR^Nonreactive   Varicella Zoster Virus Antibody IgG [[id number]]    Collection Time: 06/11/18  7:55 AM   Result Value Ref Range    Varicella Zoster Virus Antibody IgG 5.5 (H) 0.0 - 0.8 AI   ABO/Rh type and screen [EBW127]    Collection Time: 06/11/18  7:56 AM   Result Value Ref Range    ABO PENDING     Antibody Screen PENDING     Test Valid Only At          [facility]    Specimen Expires 06/14/2018    ABO Subtyping [[id number]]    Collection Time: 06/11/18  7:56 AM   Result Value Ref Range    Antigen Type Canceled, Test credited     Blood Bank Comment       Canceled, Test credited  TESTING NOT INDICATED. PATIENT IS TYPE O     ABO type [[id number]]    Collection Time: 06/11/18  8:01 AM   Result Value Ref Range    ABO O     RH(D) Pos     Specimen Expires 06/14/2018    Routine UA with microscopic [[id number]]    Collection Time: 06/11/18 10:29 AM   Result Value Ref Range    Color Urine Yellow     Appearance Urine Slightly Cloudy     Glucose Urine 150 (A) NEG^Negative mg/dL    Bilirubin Urine Negative NEG^Negative    Ketones Urine Negative NEG^Negative mg/dL    Specific Gravity Urine 1.011 1.003 - 1.035    Blood Urine Small (A) NEG^Negative    pH Urine 6.0 5.0 - 7.0 pH    Protein Albumin Urine >499 (A) NEG^Negative mg/dL    Urobilinogen mg/dL 0.0 0.0 - 2.0 mg/dL    Nitrite Urine Negative NEG^Negative    Leukocyte Esterase Urine Small (A) NEG^Negative    Source Midstream Urine     WBC Urine 27 (H) 0 - 5 /HPF    RBC Urine 7 (H) 0 - 2 /HPF    Bacteria Urine Few (A) NEG^Negative /HPF    Squamous Epithelial /HPF Urine 1 0 - 1 /HPF   General PFT Lab (Please always keep checked)    Collection Time: 06/11/18  1:59 PM   Result Value Ref Range    FVC-Pred 3.59 L    FVC-Pre 2.09 L     FVC-%Pred-Pre 58 %    FEV1-Pre 1.47 L    FEV1-%Pred-Pre 50 %    FEV1FVC-Pred 81 %    FEV1FVC-Pre 71 %    FEFMax-Pred 7.28 L/sec    FEFMax-Pre 3.87 L/sec    FEFMax-%Pred-Pre 53 %    FEF2575-Pred 2.87 L/sec    FEF2575-Pre 1.03 L/sec    GRX9149-%Pred-Pre 36 %    FEF2575-Post 1.06 L/sec    EMO7473-%Pred-Post 36 %    ExpTime-Pre 6.13 sec    FIFMax-Pre 3.44 L/sec    MVV-Pred 105 L/min    MVV-Pre 55 L/min    MVV-%Pred-Pre 52 %    VC-Pred 3.91 L    VC-Pre 2.19 L    VC-%Pred-Pre 56 %    IC-Pred 3.35 L    IC-Pre 1.91 L    IC-%Pred-Pre 57 %    ERV-Pred 0.56 L    ERV-Pre 0.28 L    ERV-%Pred-Pre 50 %    FEV1FEV6-Pred 83 %    FEV1FEV6-Pre 71 %    FRCPleth-Pred 2.86 L    FRCPleth-Pre 4.03 L    FRCPleth-%Pred-Pre 140 %    RVPleth-Pred 1.84 L    RVPleth-Pre 3.75 L    RVPleth-%Pred-Pre 203 %    TLCPleth-Pred 5.44 L    TLCPleth-Pre 5.94 L    TLCPleth-%Pred-Pre 109 %    DLCOunc-Pred 24.22 ml/min/mmHg    DLCOunc-Pre 23.00 ml/min/mmHg    DLCOunc-%Pred-Pre 94 %    DLCOcor-Pre 26.74 ml/min/mmHg    DLCOcor-%Pred-Pre 110 %    VA-Pre 4.20 L    VA-%Pred-Pre 75 %    FEV1SVC-Pred 74 %    FEV1SVC-Pre 67 %   EKG 12-lead, tracing only [EKG1]    Collection Time: 06/11/18  2:31 PM   Result Value Ref Range    Interpretation ECG Click View Image link to view waveform and result      Exam: XR CHEST 2 VW, 6/11/2018 7:12 AM     Indication: ; Chronic renal failure, stage 5 (H); History of tobacco  use; Nephritis and nephropathy, with membranoproliferative  glomerulonephritis; Organ transplant candidate; Essential  hypertension; Hyperlipidemia     Comparison: None     Findings:   PA and lateral views of the chest. Cardiac silhouette is enlarged.  Pulmonary vasculature is distinct with mild perihilar opacities.  Prominent interstitial lung markings. Few curly B-lines in the lateral  aspects of the lower lobes. No pleural effusion or pneumothorax. Upper  abdomen is unremarkable.         Impression: Mild interstitial pulmonary edema.     I have personally  reviewed the examination and initial interpretation  and I agree with the findings.     EDA CONNORS MD      Reading Physician Reading Date Result Priority     JESE Delgado MD 2018          Narrative           [id number]  [id number]  [id number]^[email or web address]^KIP^SIM     [facility]  Diagnostic and Treamtent-3rd Floor  [address] [place] [address]     Name: NATALIE DUNN  MRN: [id number]  : 1970  Study Date: 2018 01:21 PM  Age: 47 yrs  Gender: Female  [place] [facility] [place]  Reason For Study: Pre Kidney Transplant  Ordering Physician: KIP MUNGUIA  Referring Physician: KIP MUNGUIA  Performed By: [facility] Zunilda Fiore     BSA: 2.2 m2  Height: 67 in  Weight: 253 lb  BP: 167/95 mmHg  __  Procedure  Echocardiogram with two-dimensional, color and spectral Doppler performed.    Interpretation Summary  Global and regional left ventricular function is normal with an EF of 60-65%.  Mild right ventricular dilation is present.  Global right ventricular function is normal.  Moderate to severe mitral insufficiency is present.  Moderate tricuspid insufficiency is present.  Right ventricular systolic pressure is 61mmHg above the right atrial pressure.  Dilation of the inferior vena cava is present with abnormal respiratory  variation in diameter.  Previous study not available for comparison.  _  Left Ventricle  Global and regional left ventricular function is normal with an EF of 60-65%.  Left ventricular size is normal. Mild concentric wall thickening consistent  with left ventricular hypertrophy is present. Grade III or advanced diastolic  dysfunction. No regional wall motion abnormalities are seen.     Right Ventricle  Global right ventricular function is normal. Mild right ventricular dilation  is present.     Atria  The left atrium appears normal. Moderate to severe right atrial enlargement is  present.     Mitral Valve  Mild mitral  annular calcification is present. Moderate to severe mitral  insufficiency is present.     Aortic Valve  Trace aortic insufficiency is present.     Tricuspid Valve  Moderate tricuspid insufficiency is present. Right ventricular systolic  pressure is 61mmHg above the right atrial pressure.     Pulmonic Valve  The pulmonic valve is normal. Mild pulmonic insufficiency is present.     Vessels  The aorta root is normal. The pulmonary artery is normal. Dilation of the  inferior vena cava is present with abnormal respiratory variation in diameter.     Pericardium  No pericardial effusion is present.        Compared to Previous Study  Previous study not available for comparison.  _________________________________________________________________________  __  MMode/2D Measurements & Calculations     IVSd: 1.2 cm  LVIDd: 6.1 cm  LVIDs: 4.4 cm  LVPWd: 1.2 cm  FS: 27.8 %  LV mass(C)d: 322.6 grams  LV mass(C)dI: 144.3 grams/m2  Ao root diam: 3.3 cm  asc Aorta Diam: 3.5 cm  LVOT diam: 2.3 cm  LVOT area: 4.1 cm2  LA Volume (BP): 76.5 ml  LA Volume Index (BP): 34.3 ml/m2  RWT: 0.38     Doppler Measurements & Calculations  MV E max mikhail: 124.0 cm/sec  MV A max mikhail: 51.4 cm/sec  MV E/A: 2.4  MV max P.1 mmHg  MV mean P.7 mmHg  MV V2 VTI: 39.7 cm  MV dec time: 0.14 sec  PA V2 max: 115.4 cm/sec  PA max P.3 mmHg  PA acc time: 0.10 sec  TR max mikhail: 389.1 cm/sec  TR max P.6 mmHg  E/E' av.9  Lateral E/e': 17.5  Medial E/e': 22.3     Report approved by: Antwan Brewer 2018 03:19 PM

## 2018-06-12 LAB
ABO + RH BLD: ABNORMAL
ABO + RH BLD: ABNORMAL
BLD GP AB SCN SERPL QL: ABNORMAL
BLD GP AB SCN TITR SERPL: NORMAL {TITER}
BLD GP AB SCN TITR SERPL: NORMAL {TITER}
BLOOD BANK CMNT PATIENT-IMP: ABNORMAL
BLOOD BANK CMNT PATIENT-IMP: ABNORMAL
HLA TYPING COMPLETE SOT RECIPIENT: NORMAL
INTERPRETATION ECG - MUSE: NORMAL
LA PPP-IMP: NEGATIVE
M TB TUBERC IFN-G BLD QL: NEGATIVE
M TB TUBERC IFN-G/MITOGEN IGNF BLD: 0 IU/ML
PRA SINGLE ANTIGEN IGG ANTIBODY: NORMAL
SPECIMEN EXP DATE BLD: ABNORMAL

## 2018-06-14 ENCOUNTER — COMMUNICATION - HEALTHEAST (OUTPATIENT)
Dept: INTERNAL MEDICINE | Facility: CLINIC | Age: 48
End: 2018-06-14

## 2018-06-14 LAB
A* LOCUS NMDP: NORMAL
A* LOCUS: NORMAL
A* NMDP: NORMAL
A*: NORMAL
ABTEST METHOD: NORMAL
B* LOCUS NMDP: NORMAL
B* LOCUS: NORMAL
B* NMDP: NORMAL
B*: NORMAL
BW-1: NORMAL
C* LOCUS NMDP: NORMAL
C* LOCUS: NORMAL
C* NMDP: NORMAL
C*: NORMAL
COPATH REPORT: NORMAL
DPA1* NMDP: NORMAL
DPA1*: NORMAL
DPA1*LOCUS: NORMAL
DPB1* LOCUS NMDP: NORMAL
DPB1* NMDP: NORMAL
DPB1*: NORMAL
DPB1*LOCUS: NORMAL
DQA1*LOCUS NMDP: NORMAL
DQA1*LOCUS: NORMAL
DQA1*NMDP: NORMAL
DQB1* LOCUS NMDP: NORMAL
DQB1* LOCUS: NORMAL
DQB1* NMDP: NORMAL
DRB1* LOCUS: NORMAL
DRB1* NMDP: NORMAL
DRB1*: NORMAL
DRB3* LOCUS NMDP: NORMAL
DRB3* LOCUS: NORMAL
DRB3* NMDP: NORMAL
DRSSO TEST METHOD: NORMAL
PROTOCOL CUTOFF: NORMAL
UNACCEPTABLE ANTIGEN: NORMAL
UNOS CPRA: 100

## 2018-06-15 ENCOUNTER — TELEPHONE (OUTPATIENT)
Dept: TRANSPLANT | Facility: CLINIC | Age: 48
End: 2018-06-15

## 2018-06-15 ENCOUNTER — COMMUNICATION - HEALTHEAST (OUTPATIENT)
Dept: INTERNAL MEDICINE | Facility: CLINIC | Age: 48
End: 2018-06-15

## 2018-06-15 LAB
SA1 CELL: NORMAL
SA1 COMMENTS: NORMAL
SA1 HI RISK ABY: NORMAL
SA1 MOD RISK ABY: NORMAL
SA1 TEST METHOD: NORMAL
SA2 CELL: NORMAL
SA2 COMMENTS: NORMAL
SA2 HI RISK ABY UA: NORMAL
SA2 MOD RISK ABY: NORMAL
SA2 TEST METHOD: NORMAL

## 2018-06-15 NOTE — TELEPHONE ENCOUNTER
Put call out to pt and left VM 6/14. Need to do pre transplant nutrition assessment, as RD unable to see pt in clinic on 6/11 during PKE.

## 2018-06-19 ENCOUNTER — TELEPHONE (OUTPATIENT)
Dept: TRANSPLANT | Facility: CLINIC | Age: 48
End: 2018-06-19

## 2018-06-19 NOTE — TELEPHONE ENCOUNTER
"Outpatient MNT: Kidney Transplant Evaluation    Current BMI: 39.8 (HT 67 in,  lbs/115 kg)  BMI is outside criteria of <35 for kidney transplant  Goal weight for kidney transplant <222 lbs (31 lb loss)     Visit Type: phone visit for pre txp eval (unable to see in clinic on 6/11)  Referring Physician: Dr Littlejohn    History of previous txp: none   Dialysis: no, but pt anticipates starting HD within the next week or two     Nutrition Assessment  Pt cooks for self with no added salt, using Mrs Messina instead. She did visit the weight management clinic last summer (2017). Her BMI was 46 at this time. She reports changing diet after this, continuing with these changes (almost no starches) and eliminated soda. She was prescribed an appetite suppressant (naltrexone) for 30 days and reports this helped her. She would like to restart this again.     Vitamins, Supplements, Pertinent Meds: none   Herbal Medicines/Supplements: none     Diet Recall  Wakes up 5 am   7 am - banana or yogurt  10 am - 2 eggs or fruit (ie 1/2 cantaloupe)  1 pm - salad with veggies, chicken breasts, any type of dressing (\"a lot of\") or leftovers, but typically a salad   Goes to the gym after work and may not get home until 7-8 pm   7-8 pm - may have chicken, but may not eat as it is late when she gets home   Sn - may have apple, orange, yogurt with granola bar  Zoila - water, crystal light, 2 Gatorade/week, occasional coffee  EtOH - a few glasses of wine 1x/month  Dining out - none d/t expense and health     Physical Activity  Swims 4x/week for 1 hour each time      Anthropometrics  Height:   67 in   BMI:    39.8    Weight Status:Obesity Grade II BMI 35-39.9   Weight:  253 lbs            IBW (lb): 135  % IBW: 187    Wt Hx: Pt reports she believes she is retaining ~20 lbs worth of fluid right now. She is slightly overwhelmed hearing she needs to reach 222 lbs for kidney transplant, as she has already lost weight.    Adj/dosing BW: 165 lbs/75 kg   "     Labs  No results for input(s): CHOL, HDL, LDL, TRIG, CHOLHDLRATIO in the last 15919 hours.  No results found for: A1C  Potassium   Date Value Ref Range Status   06/11/2018 3.9 3.4 - 5.3 mmol/L Final     PHOSPHORUS: no level on file     Malnutrition  % Intake: Decreased intake does not meet criteria for malnutrition   % Weight Loss: Weight loss does not meet criteria for malnutrition   Subcutaneous Fat Loss: does not meet criteria  Muscle Loss: does not meet criteria  Fluid Accumulation/Edema: Severe per pt report  Malnutrition Diagnosis: Patient does not meet two of the above criteria necessary for diagnosing malnutrition     Estimated Nutrition Needs  Energy  1500     (20 kcal/kg dosing BW for desired wt loss)       Protein  45-60    (0.6-0.8 g/kg for CKD)           Fluid  1 ml/kcal or per MD   Micronutrient   Na+: <2000 mg/day  K+: 7197-3330 mg/day  Phos: 800-1000 mg/day            Nutrition Diagnosis  Food and nutrition related knowledge deficit r/t pre kidney transplant eval AEB pt verbalized not hearing pre/post transplant diet guidelines.    Obesity r/t excessive energy intake and inadequate physical activity AEB BMI >30.    Nutrition Intervention  Nutrition education provided:  Discussed strategies for weight loss. Encouraged pt to revisit weight management clinic if she is open to this, which she is. She is hoping to get another Rx for naltrexone. Will message her coordinator to provide her with the weight management clinic phone number. Encouraged pt to measure out salad dressing, as this adds up quickly. Congratulated her on weight loss progress thus far and that she has made some great changes. Did encourage her to eat something small (boiled egg, fruit, yogurt, etc) after getting home from the gym, even if it is 7-8 pm. Explained rationale. Also discussed that after she starts dialysis, her weight will drop significantly, so her true weight loss goal will become more clear.     Discussed sodium  intake (low sodium foods and drinks, seasoning food without salt and tips for low sodium diet). Pt doing well with this.     Reviewed post txp diet guidelines in brief (will review in further detail post txp):  (1) Review of proper food safety measures d/t immunosuppressant therapy post-op and increased risk for food-borne illness   (2) Stressed importance of not taking any herbal/Chinese/alternative medicines or supplements post txp (d/t risk for rejection, unknown effects on the organs, potential interactions with immunosuppresants).   (3) Med regimen and possible side effects    Patient Understanding: Pt verbalized understanding of education provided.  Expected Compliance: Good  Follow-Up Plans: PRN     Nutrition Goals  1. Weight loss to goal wt: < 222 lbs (BMI <35 or per MD)   2. Continue limiting Na+ <2000mg/day  3. Pt to verbalize understanding of 3 aspects of post txp education provided    Provided pt with contact info.   Radhika Jaime RD, LD  [facility] [phone]

## 2018-06-26 ENCOUNTER — COMMUNICATION - HEALTHEAST (OUTPATIENT)
Dept: INTERNAL MEDICINE | Facility: CLINIC | Age: 48
End: 2018-06-26

## 2018-07-05 ENCOUNTER — RECORDS - HEALTHEAST (OUTPATIENT)
Dept: ADMINISTRATIVE | Facility: OTHER | Age: 48
End: 2018-07-05

## 2018-08-03 ENCOUNTER — OFFICE VISIT - HEALTHEAST (OUTPATIENT)
Dept: INTERNAL MEDICINE | Facility: CLINIC | Age: 48
End: 2018-08-03

## 2018-08-03 DIAGNOSIS — Z12.4 CERVICAL CANCER SCREENING: ICD-10-CM

## 2018-08-03 DIAGNOSIS — N18.6 ESRD (END STAGE RENAL DISEASE) (H): ICD-10-CM

## 2018-08-03 DIAGNOSIS — N89.8 VAGINAL DISCHARGE: ICD-10-CM

## 2018-08-03 DIAGNOSIS — Z11.3 SCREEN FOR STD (SEXUALLY TRANSMITTED DISEASE): ICD-10-CM

## 2018-08-03 DIAGNOSIS — Z23 NEED FOR VACCINATION: ICD-10-CM

## 2018-08-03 DIAGNOSIS — I05.9 MITRAL VALVE DISORDER: ICD-10-CM

## 2018-08-03 DIAGNOSIS — Z71.6 ENCOUNTER FOR SMOKING CESSATION COUNSELING: ICD-10-CM

## 2018-08-03 LAB
CLUE CELLS: ABNORMAL
HIV 1+2 AB+HIV1 P24 AG SERPL QL IA: NEGATIVE
TRICHOMONAS, WET PREP: ABNORMAL
YEAST, WET PREP: ABNORMAL

## 2018-08-03 ASSESSMENT — MIFFLIN-ST. JEOR: SCORE: 1701.29

## 2018-08-04 LAB
HBV SURFACE AG SERPL QL IA: NEGATIVE
T PALLIDUM AB SER QL: NEGATIVE

## 2018-08-06 ENCOUNTER — COMMUNICATION - HEALTHEAST (OUTPATIENT)
Dept: INTERNAL MEDICINE | Facility: CLINIC | Age: 48
End: 2018-08-06

## 2018-08-06 LAB
C TRACH DNA SPEC QL PROBE+SIG AMP: NEGATIVE
HCV AB SERPL QL IA: NEGATIVE
HPV SOURCE: NORMAL
HUMAN PAPILLOMA VIRUS 16 DNA: NEGATIVE
HUMAN PAPILLOMA VIRUS 18 DNA: NEGATIVE
HUMAN PAPILLOMA VIRUS FINAL DIAGNOSIS: NORMAL
HUMAN PAPILLOMA VIRUS OTHER HR: NEGATIVE
N GONORRHOEA DNA SPEC QL NAA+PROBE: NEGATIVE
SPECIMEN DESCRIPTION: NORMAL

## 2018-08-10 ENCOUNTER — COMMUNICATION - HEALTHEAST (OUTPATIENT)
Dept: INTERNAL MEDICINE | Facility: CLINIC | Age: 48
End: 2018-08-10

## 2018-08-10 LAB
BKR LAB AP ABNORMAL BLEEDING: YES
BKR LAB AP BIRTH CONTROL/HORMONES: NORMAL
BKR LAB AP CERVICAL APPEARANCE: NORMAL
BKR LAB AP GYN ADEQUACY: NORMAL
BKR LAB AP GYN INTERPRETATION: NORMAL
BKR LAB AP GYN OTHER FINDINGS: NORMAL
BKR LAB AP HPV REFLEX: NORMAL
BKR LAB AP LMP: 2008
BKR LAB AP PATIENT STATUS: NORMAL
BKR LAB AP PREVIOUS ABNORMAL: NO
BKR LAB AP PREVIOUS NORMAL: 2008
HIGH RISK?: NO
PATH REPORT.COMMENTS IMP SPEC: NORMAL
RESULT FLAG (HE HISTORICAL CONVERSION): NORMAL

## 2018-08-21 ENCOUNTER — TELEPHONE (OUTPATIENT)
Dept: TRANSPLANT | Facility: CLINIC | Age: 48
End: 2018-08-21

## 2018-08-21 NOTE — TELEPHONE ENCOUNTER
Contacted patient to review outcome of selection committee meeting (See selection committee encounter).   Explained to patient that he/she needs to complete all components of the evaluation to be eligible for active status on the waiting list or to proceed with a live donor kidney transplant.   Reviewed next steps based on outcomes: weight loss to BMI of 35 or less, Compliance Contract x 6 months, Chemical Dependency assessment now.  Once all successfully completed will need to repeat echocardiogram, PFT's and do bilateral iliac US.  Pt had several questions regarding the need for a compliance contract - I did review reasoning as missed appointments recently as well as in the past. Pt seemed to think that she had good reasons for missing appointments. I did review that reason to demonstrate compliance pre transplant as this is an indicator of ability to be compliant post transplant, I did review our standard use of a 6 months Compliance Contract in transplant evaluations which has been standard for decades.  Pt requesting I check back with Transplant Providers regarding need for Compliance Contract - told her I will do, but for now the decision is for her to complete the Compliance Contract.   Pt has several questions about donor and donor process which I answered to the best of my abiliy.,   Patient will not be listed (patient is on dialysis and evaluation is not complete), patient will receive:    - An Evaluation Summary Letter indicating what is needed to complete evaluation-discussed with patient if they would like to have testing done with  [employer] or locally  Confirmed with patient that on successful completion of outstanding components, patient is eligible for active status and they will receive a follow-up call.   Confirmed that patient has contact information for additional questions or concerns. Pt expressed very good understanding of all and was in good agreement with the plan.   Generated Transplant  Evaluation Summary Letter today in EPIC - routed to  for mailing.

## 2018-08-21 NOTE — LETTER
08/21/18        Alla Shrestha  [address]   [address]  [place] [address]        Dear Alla,    It was a pleasure to see you recently for consideration of kidney transplantation. Your pre-transplant evaluation results were reviewed at our Multidisciplinary Selection Committee. The Committee is requesting the following items are completed before determining your candidacy:    1. Weight Loss down to Body Mass  Index ( BMI ) of 35 or less. For your height of 5 foot 7 inches, your weight will need to equal 222 pounds.  You have the option of establishing care with our Weight Loss Management Clinic to assist you with weight loss. Please call [phone] to schedule an appointment.   2. Need to demonstrate 6 months of consistent medical compliance. Please see the enclosed Compliance Contract, read and if agreement please sign and return to our Office per the provided envelope. Your compliance period is from June 20, 2018 to December 20, 2018. At the end of your 6 months, you need to arrange for your primary care provider, your nephrologist and Chemical Dependency Provider to send a letter to our Office that summarizes what they are treating you for, their observation of your compliance with their recommendations during this time as well as their opinion of your ability to be compliant post transplant.  Please arrange for these letters to be faxed to our Office at [phone].   3. Chemical Dependency assessment needs to be completed now. A  from our Transplant Program will call you with CD options. Please arrange for these records from this asessement and follow-up need to be faxed to our Office at [phone].                      Upon successful completion of all the above, I will then facilitate getting appointments scheduled for the following items:     1. Cardiologist appointment.   2. Lower extremity venous duplex ultrasound due to edema.   3. Bilateral arterial duplex aorto-iliac  ultrasound due to difficulty palpating left groin pulse on exam.   4. Repeat echocardiogram.   5. Possible repeat chest x-ray.   6. Smoking cessation is strongly encouraged.   7. Possibly repeat pulmonary function test.   8. Hematology consult to evaluate anemia if this is still warranted.    9. Dental work will need to remain up to date.   10. Mammogram and PAP smear testing needs to remain up to date.   For any questions, please contact the Transplant Office at [phone].      Sincerely,      Carolin Underwood RN BSN Transplant Coordinator   Solid Organ Transplant  [facility], [facility]    Enclosure: UNOS Letter     CCS: Betty Vazquez MD; Liliya Capellan MD; Sunshine Turner MD

## 2018-08-23 ENCOUNTER — TELEPHONE (OUTPATIENT)
Dept: TRANSPLANT | Facility: CLINIC | Age: 48
End: 2018-08-23

## 2018-08-23 NOTE — TELEPHONE ENCOUNTER
Transplant Social Work Services Phone Call      Data: Chem Dep Eval Resources  Intervention: Received in-basket message from patient's coordinator that patient was requesting resources on where to get a chemical dependency evaluation done. Contacted patient. Informed patient she should contact her insurance to see where an evaluation should be covered.   Assessment: Patient has a history of alcohol abuse and transplant committee recommends patient get a chem dep evaluation  Education provided by SW: Chem Dep  Plan: Patient to update her transplant coordinator once evaluation is complete.     Thelma Harman, [facility]    Kidney/Pancreas/Auto Islet Transplant Programs

## 2018-09-05 ENCOUNTER — TELEPHONE (OUTPATIENT)
Dept: TRANSPLANT | Facility: CLINIC | Age: 48
End: 2018-09-05

## 2018-09-05 NOTE — TELEPHONE ENCOUNTER
Spoke with Antonina HEAD Case Manager today from [facility] who called for update on tx eval - provided this.

## 2018-10-02 ENCOUNTER — COMMUNICATION - HEALTHEAST (OUTPATIENT)
Dept: INTERNAL MEDICINE | Facility: CLINIC | Age: 48
End: 2018-10-02

## 2018-10-02 DIAGNOSIS — N89.8 VAGINAL DISCHARGE: ICD-10-CM

## 2018-10-09 ENCOUNTER — OFFICE VISIT (OUTPATIENT)
Dept: ENDOCRINOLOGY | Facility: CLINIC | Age: 48
End: 2018-10-09
Payer: COMMERCIAL

## 2018-10-09 ENCOUNTER — RECORDS - HEALTHEAST (OUTPATIENT)
Dept: ADMINISTRATIVE | Facility: OTHER | Age: 48
End: 2018-10-09

## 2018-10-09 VITALS
HEART RATE: 106 BPM | DIASTOLIC BLOOD PRESSURE: 93 MMHG | OXYGEN SATURATION: 100 % | SYSTOLIC BLOOD PRESSURE: 139 MMHG | HEIGHT: 67 IN | BODY MASS INDEX: 36.9 KG/M2 | WEIGHT: 235.1 LBS

## 2018-10-09 DIAGNOSIS — E66.812 CLASS 2 SEVERE OBESITY DUE TO EXCESS CALORIES WITH SERIOUS COMORBIDITY AND BODY MASS INDEX (BMI) OF 36.0 TO 36.9 IN ADULT (H): ICD-10-CM

## 2018-10-09 DIAGNOSIS — E66.01 MORBID OBESITY, UNSPECIFIED OBESITY TYPE (H): ICD-10-CM

## 2018-10-09 DIAGNOSIS — E66.01 CLASS 2 SEVERE OBESITY DUE TO EXCESS CALORIES WITH SERIOUS COMORBIDITY AND BODY MASS INDEX (BMI) OF 36.0 TO 36.9 IN ADULT (H): ICD-10-CM

## 2018-10-09 RX ORDER — AMLODIPINE BESYLATE 10 MG/1
TABLET ORAL
COMMUNITY
Start: 2018-09-24 | End: 2022-03-03

## 2018-10-09 RX ORDER — TOPIRAMATE 25 MG/1
TABLET, FILM COATED ORAL
Qty: 180 TABLET | Refills: 2 | Status: SHIPPED | OUTPATIENT
Start: 2018-10-09 | End: 2019-01-15

## 2018-10-09 RX ORDER — NALTREXONE HYDROCHLORIDE 50 MG/1
TABLET, FILM COATED ORAL
Qty: 15 TABLET | Refills: 2 | Status: CANCELLED | OUTPATIENT
Start: 2018-10-09

## 2018-10-09 RX ORDER — ATORVASTATIN CALCIUM 40 MG/1
TABLET, FILM COATED ORAL
COMMUNITY
Start: 2018-09-11 | End: 2024-03-11

## 2018-10-09 ASSESSMENT — ENCOUNTER SYMPTOMS
SLEEP DISTURBANCES DUE TO BREATHING: 0
RECTAL BLEEDING: 0
SORE THROAT: 0
WHEEZING: 0
EYE REDNESS: 0
NUMBNESS: 0
MUSCLE WEAKNESS: 0
RESPIRATORY PAIN: 0
DECREASED CONCENTRATION: 0
NAUSEA: 0
HALLUCINATIONS: 0
PANIC: 0
DECREASED APPETITE: 0
SINUS PAIN: 0
FEVER: 0
BREAST PAIN: 0
HEARTBURN: 0
SWOLLEN GLANDS: 0
NECK PAIN: 0
TACHYCARDIA: 0
HEMATURIA: 0
LEG PAIN: 0
WEIGHT LOSS: 0
DYSPNEA ON EXERTION: 0
DEPRESSION: 0
HOARSE VOICE: 0
ABDOMINAL PAIN: 0
ORTHOPNEA: 0
DIZZINESS: 0
EXERCISE INTOLERANCE: 0
ALTERED TEMPERATURE REGULATION: 0
TINGLING: 0
MEMORY LOSS: 0
HOT FLASHES: 0
NERVOUS/ANXIOUS: 0
POSTURAL DYSPNEA: 0
FLANK PAIN: 0
SHORTNESS OF BREATH: 0
SPEECH CHANGE: 0
INCREASED ENERGY: 0
MYALGIAS: 0
DOUBLE VISION: 0
LEG SWELLING: 0
DIFFICULTY URINATING: 0
HYPOTENSION: 0
NIGHT SWEATS: 0
DYSURIA: 0
INSOMNIA: 0
JOINT SWELLING: 0
SPUTUM PRODUCTION: 0
EYE WATERING: 0
TROUBLE SWALLOWING: 0
SMELL DISTURBANCE: 0
EYE PAIN: 0
STIFFNESS: 0
MUSCLE CRAMPS: 0
BREAST MASS: 0
CLAUDICATION: 0
ARTHRALGIAS: 0
SEIZURES: 0
HYPERTENSION: 0
DISTURBANCES IN COORDINATION: 0
JAUNDICE: 0
HEMOPTYSIS: 0
POLYDIPSIA: 0
COUGH: 0
SYNCOPE: 0
POOR WOUND HEALING: 0
SNORES LOUDLY: 0
WEIGHT GAIN: 0
PALPITATIONS: 0
FATIGUE: 0
CONSTIPATION: 0
BOWEL INCONTINENCE: 0
BLOOD IN STOOL: 0
TASTE DISTURBANCE: 0
WEAKNESS: 0
LIGHT-HEADEDNESS: 0
NECK MASS: 0
BLOATING: 0
RECTAL PAIN: 0
SKIN CHANGES: 0
TREMORS: 0
DECREASED LIBIDO: 0
VOMITING: 0
CHILLS: 0
DIARRHEA: 0
POLYPHAGIA: 0
EYE IRRITATION: 0
HEADACHES: 0
NAIL CHANGES: 0
BACK PAIN: 0
PARALYSIS: 0
COUGH DISTURBING SLEEP: 0
SINUS CONGESTION: 0
LOSS OF CONSCIOUSNESS: 0
EXTREMITY NUMBNESS: 0
BRUISES/BLEEDS EASILY: 0

## 2018-10-09 NOTE — PATIENT INSTRUCTIONS
. Start topiramate: Take 25 mg (one tab) at bedtime for one week, then increase to 50 mg (two tabs) at bedtime thereafter.   - will have telephone visit with Nkecih Payne, Pharmacist  - see me in 3 months  - try to walk more    If you have any questions, please do not hesitate to call Weight management clinic at [phone] or [phone].    Sincerely,    Sunshine Turner MD  Endocrinology

## 2018-10-09 NOTE — MR AVS SNAPSHOT
After Visit Summary   10/9/2018    Alla Shrestha    MRN: [id number]           Patient Information     Date Of Birth          1970        Visit Information        Provider Department      10/9/2018 4:30 PM Sunshine Turner MD [facility] Medical Weight Management        Today's Diagnoses     Morbid obesity, unspecified obesity type (H)          Care Instructions    . Start topiramate: Take 25 mg (one tab) at bedtime for one week, then increase to 50 mg (two tabs) at bedtime thereafter.   - will have telephone visit with Nkechi Payne Pharmacist  - see me in 3 months  - try to walk more    If you have any questions, please do not hesitate to call Weight management clinic at [phone] or [phone].    Sincerely,    Sunshine Turner MD  Endocrinology                    Follow-ups after your visit        Who to contact     Please call your clinic at [phone] to:    Ask questions about your health    Make or cancel appointments    Discuss your medicines    Learn about your test results    Speak to your doctor            Additional Information About Your Visit        [employer] Information     [employer] is an electronic gateway that provides easy, online access to your medical records. With [employer], you can request a clinic appointment, read your test results, renew a prescription or communicate with your care team.     To sign up for [employer] visit the website at [email or web address]   You will be asked to enter the access code listed below, as well as some personal information. Please follow the directions to create your username and password.     Your access code is: [email or web address]  Expires: 2018  6:31 AM     Your access code will  in 90 days. If you need help or a new code, please contact your [facility] or call [phone] for assistance.        Care EveryWhere ID     This is your Care EveryWhere ID. This could be used by other  "organizations to access your [facility] medical records  [id number]        Your Vitals Were     Pulse Height Pulse Oximetry BMI (Body Mass Index)          106 1.702 m (5' 7\") 100% 36.82 kg/m2         Blood Pressure from Last 3 Encounters:   10/09/18 (!) 139/93   06/11/18 (!) 167/95   09/26/17 (!) 177/107    Weight from Last 3 Encounters:   10/09/18 106.6 kg (235 lb 1.6 oz)   06/11/18 115 kg (253 lb 9.6 oz)   05/03/18 107 kg (236 lb)              Today, you had the following     No orders found for display       Primary Care Provider Office Phone # Fax #    Betty Vazquez [phone]       [facility] [address] [address]        Equal Access to Services     [facility]: Hadii ron sullivan hadasho Soomaali, waaxda luqadaha, qaybta kaalmada adeegyada, carlos hesterin haymichaeln sergio arevalo . So [facility] [phone].    ATENCIÓN: Si habla español, tiene a dudley disposición servicios gratuitos de asistencia lingüística. Rosangela al [phone].    We comply with applicable federal civil rights laws and [place] laws. We do not discriminate on the basis of race, color, national origin, age, disability, sex, sexual orientation, or gender identity.            Thank you!     Thank you for choosing [facility] WEIGHT MANAGEMENT  for your care. Our goal is always to provide you with excellent care. Hearing back from our patients is one way we can continue to improve our services. Please take a few minutes to complete the written survey that you may receive in the mail after your visit with us. Thank you!             Your Updated Medication List - Protect others around you: Learn how to safely use, store and throw away your medicines at www.disposemymeds.org.          This list is accurate as of 10/9/18  4:52 PM.  Always use your most recent med list.                   Brand Name Dispense Instructions for use Diagnosis    amLODIPine 10 MG tablet    NORVASC          atorvastatin " 40 MG tablet    LIPITOR          calcitRIOL 0.25 MCG capsule    ROCALTROL     Take 0.25 mcg by mouth daily.        lisinopril 40 MG tablet    PRINIVIL/ZESTRIL    30 tablet    Take 1 tablet (40 mg) by mouth daily        metoprolol tartrate 25 MG tablet    LOPRESSOR     Take 25 mg by mouth 2 times daily.        naltrexone 50 MG tablet    DEPADE;REVIA    15 tablet    Take 1/2 Tablet daily    Morbid obesity, unspecified obesity type (H)       simvastatin 20 MG tablet    ZOCOR     Take 1 tablet by mouth daily.        sodium bicarbonate 650 MG tablet      Take 325 mg by mouth 3 times daily.        traZODone 50 MG tablet    DESYREL     Take 1 tablet by mouth At Bedtime.

## 2018-10-09 NOTE — PROGRESS NOTES
"    Return Medical Weight Management Note     Alla Shrestha  MRN:  [id number]  :  1970  RHONDA:   10/9/2018    Dear PCP    I had the pleasure of seeing your patient Alla Shrestha. 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    INTERVAL HISTORY:  Last seen 2017. She has lost 29 lbs since 2017. She was on naltrexone but could not get refill because she missed appointment.  She said that naltrexone helped curbing some of appetite. She did have knee replacement in 2017. She is now on hemodialysis -. She said that she needs to have weight down to 224 lbs in order to get kidney transplant.     CURRENT WEIGHT:   235 lbs 1.6 oz    Wt Readings from Last 4 Encounters:   10/09/18 106.6 kg (235 lb 1.6 oz)   18 115 kg (253 lb 9.6 oz)   18 107 kg (236 lb)   17 119.8 kg (264 lb 1.6 oz)       Height:  5' 7\"  Body Mass Index:  Body mass index is 36.82 kg/(m^2).  Vitals:  BP (!) 139/93  Pulse 106  Ht 1.702 m (5' 7\")  Wt 106.6 kg (235 lb 1.6 oz)  SpO2 100%  BMI 36.82 kg/m2    Initial consult weight was 289 on 2017 at [facility] consult  Weight change since last seen on 2017 is down 29 pounds.   Total loss is 54 pounds.    Review of Systems     Constitutional:  Negative for fever, chills, weight loss, weight gain, fatigue, decreased appetite, night sweats, recent stressors, height gain, height loss, post-operative complications, incisional pain, hallucinations, increased energy, hyperactivity and confused.   HENT:  Negative for ear pain, hearing loss, tinnitus, nosebleeds, trouble swallowing, hoarse voice, mouth sores, sore throat, ear discharge, tooth pain, gum tenderness, taste disturbance, smell disturbance, hearing aid, bleeding gums, dry mouth, sinus pain, sinus congestion and neck mass.  "   Eyes:  Negative for double vision, pain, redness, eye pain, decreased vision, eye watering, eye bulging, eye dryness, flashing lights, spots, floaters, strabismus, tunnel vision, jaundice and eye irritation.   Respiratory:   Negative for cough, hemoptysis, sputum production, shortness of breath, wheezing, sleep disturbances due to breathing, snores loudly, respiratory pain, dyspnea on exertion, cough disturbing sleep and postural dyspnea.    Cardiovascular:  Negative for chest pain, dyspnea on exertion, palpitations, orthopnea, claudication, leg swelling, fingers/toes turn blue, hypertension, hypotension, syncope, history of heart murmur, chest pain on exertion, chest pain at rest, pacemaker, few scattered varicosities, leg pain, sleep disturbances due to breathing, tachycardia, light-headedness, exercise intolerance and edema.   Gastrointestinal:  Negative for heartburn, nausea, vomiting, abdominal pain, diarrhea, constipation, blood in stool, melena, rectal pain, bloating, hemorrhoids, bowel incontinence, jaundice, rectal bleeding, coffee ground emesis and change in stool.   Genitourinary:  Negative for bladder incontinence, dysuria, urgency, hematuria, flank pain, vaginal discharge, difficulty urinating, genital sores, dyspareunia, decreased libido, nocturia, voiding less frequently, arousal difficulty, abnormal vaginal bleeding, excessive menstruation, menstrual changes, hot flashes, vaginal dryness and postmenopausal bleeding.   Musculoskeletal:  Negative for myalgias, back pain, joint swelling, arthralgias, stiffness, muscle cramps, neck pain, bone pain, muscle weakness and fracture.   Skin:  Positive for itching and rash. Negative for nail changes, poor wound healing, hair changes, skin changes, acne, warts, poor wound healing, scarring, flaky skin, Raynaud's phenomenon, sensitivity to sunlight and skin thickening.   Neurological:  Negative for dizziness, tingling, tremors, speech change, seizures, loss of  consciousness, weakness, light-headedness, numbness, headaches, disturbances in coordination, extremity numbness, memory loss, difficulty walking and paralysis.   Endo/Heme:  Negative for anemia, swollen glands and bruises/bleeds easily.   Psychiatric/Behavioral:  Negative for depression, hallucinations, memory loss, decreased concentration, mood swings and panic attacks.    Breast:  Negative for breast discharge, breast mass, breast pain and nipple retraction.   Endocrine:  Negative for altered temperature regulation, polyphagia, polydipsia, unwanted hair growth and change in facial hair.      MEDICATIONS:   Current Outpatient Prescriptions   Medication Sig Dispense Refill     calcitRIOL (ROCALTROL) 0.25 MCG capsule Take 0.25 mcg by mouth daily.       lisinopril (PRINIVIL,ZESTRIL) 40 MG tablet Take 1 tablet (40 mg) by mouth daily 30 tablet      metoprolol (LOPRESSOR) 25 MG tablet Take 25 mg by mouth 2 times daily.       naltrexone (DEPADE;REVIA) 50 MG tablet Take 1/2 Tablet daily 15 tablet 2     simvastatin (ZOCOR) 20 MG tablet Take 1 tablet by mouth daily.       sodium bicarbonate 650 MG tablet Take 325 mg by mouth 3 times daily.       traZODone (DESYREL) 50 MG tablet Take 1 tablet by mouth At Bedtime.           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    PLAN:  - start topiramate 25 mg up to 50 mg daily  - encourage her to have more protein  - continue diet plan  - encourage exercise as tolerated    FOLLOW-UP:    Telephone visit with Nkechi Payne, PharmD in 1 month  RTC 4 months    Time: 15 min spent on evaluation, management, counseling, education, &  motivational interviewing with greater than 50 % of the total time was spent on counseling and coordinating care    Sincerely,    Sunshine Turner MD

## 2018-10-09 NOTE — NURSING NOTE
"  Chief Complaint   Patient presents with     Weight Problem     RMWM     Vitals:    10/09/18 1634   BP: (!) 139/93   Pulse: 106   SpO2: 100%   Weight: 235 lb 1.6 oz   Height: 5' 7\"     Body mass index is 36.82 kg/(m^2).  Carlos De La Cruz CMA    "

## 2018-10-09 NOTE — LETTER
"10/9/2018       RE: Alla Shrestha  [address]  [address]  [place] [address]     Dear Colleague,    Thank you for referring your patient, Alla Shrestha, to the [facility] MEDICAL WEIGHT MANAGEMENT at [facility]. Please see a copy of my visit note below.        Return Medical Weight Management Note     Alla Shrestha  MRN:  [id number]  :  1970  RHONDA:   10/9/2018    Dear PCP    I had the pleasure of seeing your patient Alla Shrestha. 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    INTERVAL HISTORY:  Last seen 2017. She has lost 29 lbs since 2017. She was on naltrexone but could not get refill because she missed appointment.  She said that naltrexone helped curbing some of appetite. She did have knee replacement in 2017. She is now on hemodialysis . She said that she needs to have weight down to 224 lbs in order to get kidney transplant.     CURRENT WEIGHT:   235 lbs 1.6 oz    Wt Readings from Last 4 Encounters:   10/09/18 106.6 kg (235 lb 1.6 oz)   18 115 kg (253 lb 9.6 oz)   18 107 kg (236 lb)   17 119.8 kg (264 lb 1.6 oz)       Height:  5' 7\"  Body Mass Index:  Body mass index is 36.82 kg/(m^2).  Vitals:  BP (!) 139/93  Pulse 106  Ht 1.702 m (5' 7\")  Wt 106.6 kg (235 lb 1.6 oz)  SpO2 100%  BMI 36.82 kg/m2    Initial consult weight was 289 on 2017 at [facility] consult  Weight change since last seen on 2017 is down 29 pounds.   Total loss is 54 pounds.    Review of Systems     Constitutional:  Negative for fever, chills, weight loss, weight gain, fatigue, decreased appetite, night sweats, recent stressors, height gain, height loss, post-operative complications, incisional pain, hallucinations, increased energy, hyperactivity and " confused.   HENT:  Negative for ear pain, hearing loss, tinnitus, nosebleeds, trouble swallowing, hoarse voice, mouth sores, sore throat, ear discharge, tooth pain, gum tenderness, taste disturbance, smell disturbance, hearing aid, bleeding gums, dry mouth, sinus pain, sinus congestion and neck mass.    Eyes:  Negative for double vision, pain, redness, eye pain, decreased vision, eye watering, eye bulging, eye dryness, flashing lights, spots, floaters, strabismus, tunnel vision, jaundice and eye irritation.   Respiratory:   Negative for cough, hemoptysis, sputum production, shortness of breath, wheezing, sleep disturbances due to breathing, snores loudly, respiratory pain, dyspnea on exertion, cough disturbing sleep and postural dyspnea.    Cardiovascular:  Negative for chest pain, dyspnea on exertion, palpitations, orthopnea, claudication, leg swelling, fingers/toes turn blue, hypertension, hypotension, syncope, history of heart murmur, chest pain on exertion, chest pain at rest, pacemaker, few scattered varicosities, leg pain, sleep disturbances due to breathing, tachycardia, light-headedness, exercise intolerance and edema.   Gastrointestinal:  Negative for heartburn, nausea, vomiting, abdominal pain, diarrhea, constipation, blood in stool, melena, rectal pain, bloating, hemorrhoids, bowel incontinence, jaundice, rectal bleeding, coffee ground emesis and change in stool.   Genitourinary:  Negative for bladder incontinence, dysuria, urgency, hematuria, flank pain, vaginal discharge, difficulty urinating, genital sores, dyspareunia, decreased libido, nocturia, voiding less frequently, arousal difficulty, abnormal vaginal bleeding, excessive menstruation, menstrual changes, hot flashes, vaginal dryness and postmenopausal bleeding.   Musculoskeletal:  Negative for myalgias, back pain, joint swelling, arthralgias, stiffness, muscle cramps, neck pain, bone pain, muscle weakness and fracture.   Skin:  Positive for  itching and rash. Negative for nail changes, poor wound healing, hair changes, skin changes, acne, warts, poor wound healing, scarring, flaky skin, Raynaud's phenomenon, sensitivity to sunlight and skin thickening.   Neurological:  Negative for dizziness, tingling, tremors, speech change, seizures, loss of consciousness, weakness, light-headedness, numbness, headaches, disturbances in coordination, extremity numbness, memory loss, difficulty walking and paralysis.   Endo/Heme:  Negative for anemia, swollen glands and bruises/bleeds easily.   Psychiatric/Behavioral:  Negative for depression, hallucinations, memory loss, decreased concentration, mood swings and panic attacks.    Breast:  Negative for breast discharge, breast mass, breast pain and nipple retraction.   Endocrine:  Negative for altered temperature regulation, polyphagia, polydipsia, unwanted hair growth and change in facial hair.      MEDICATIONS:   Current Outpatient Prescriptions   Medication Sig Dispense Refill     calcitRIOL (ROCALTROL) 0.25 MCG capsule Take 0.25 mcg by mouth daily.       lisinopril (PRINIVIL,ZESTRIL) 40 MG tablet Take 1 tablet (40 mg) by mouth daily 30 tablet      metoprolol (LOPRESSOR) 25 MG tablet Take 25 mg by mouth 2 times daily.       naltrexone (DEPADE;REVIA) 50 MG tablet Take 1/2 Tablet daily 15 tablet 2     simvastatin (ZOCOR) 20 MG tablet Take 1 tablet by mouth daily.       sodium bicarbonate 650 MG tablet Take 325 mg by mouth 3 times daily.       traZODone (DESYREL) 50 MG tablet Take 1 tablet by mouth At Bedtime.           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    PLAN:  - start topiramate 25 mg up to 50 mg daily  - encourage her to have more protein  - continue diet plan  - encourage exercise as tolerated    FOLLOW-UP:    Telephone visit with Martin FosterD in 1 month  RTC 4 months    Time: 15 min spent on evaluation, management, counseling, education, & motivational interviewing with greater than 50 % of the total time was spent on counseling and coordinating care    Sincerely,    Sunshine Turner MD

## 2018-11-08 ENCOUNTER — TELEPHONE (OUTPATIENT)
Dept: PHARMACY | Facility: CLINIC | Age: 48
End: 2018-11-08

## 2018-11-08 NOTE — LETTER
[facility]  [address] [id number] [phone]          November 8, 2018    Alla Shrestha                                                                                                                     [address]  [address]  [place] [address]            Xiang Gold,     My name is Nekchi Payne, I am the Medication Therapy Management (MTM) Pharmacist that works at the [facility] with Dr. Sunshine Turner . It looks like Dr. Sunshine Turner  wanted you and I to follow up with one another regarding the new medication that was started for weight loss, Topiramate. I tried calling you on the phone numbers within your file for a phone appointment that was scheduled for today, 11/08, at 2 pm. I was unable to leave a voicemail as the mailbox was full    I would love to follow up with you. You can either call my phone number at [phone] or call the [facility] scheduling department to reschedule at [phone].     Thank you and I look forward to hearing from you,        Nkechi Payne, PharmD  Medication Therapy Management Pharmacist   [facility]   Phone: [phone]

## 2018-11-08 NOTE — TELEPHONE ENCOUNTER
Called patient at all numbers on file for patient's 2 PM MTM follow up appointment. Patient did not answer. Could not leave voicemail as the mailbox was full on all phone lines.     Cannot send mychart as it is pending. Will send letter of how patient can reschedule.     Nkechi Payne, PharmD  Medication Therapy Management Pharmacist   [facility]   Phone: [phone]

## 2018-11-26 ENCOUNTER — TRANSFERRED RECORDS (OUTPATIENT)
Dept: HEALTH INFORMATION MANAGEMENT | Facility: CLINIC | Age: 48
End: 2018-11-26

## 2019-01-15 ENCOUNTER — OFFICE VISIT (OUTPATIENT)
Dept: ENDOCRINOLOGY | Facility: CLINIC | Age: 49
End: 2019-01-15
Payer: COMMERCIAL

## 2019-01-15 ENCOUNTER — RECORDS - HEALTHEAST (OUTPATIENT)
Dept: ADMINISTRATIVE | Facility: OTHER | Age: 49
End: 2019-01-15

## 2019-01-15 VITALS
SYSTOLIC BLOOD PRESSURE: 145 MMHG | WEIGHT: 241.5 LBS | BODY MASS INDEX: 37.9 KG/M2 | DIASTOLIC BLOOD PRESSURE: 83 MMHG | HEIGHT: 67 IN | OXYGEN SATURATION: 100 % | HEART RATE: 89 BPM

## 2019-01-15 DIAGNOSIS — E66.01 CLASS 2 SEVERE OBESITY WITH SERIOUS COMORBIDITY AND BODY MASS INDEX (BMI) OF 37.0 TO 37.9 IN ADULT, UNSPECIFIED OBESITY TYPE (H): Primary | ICD-10-CM

## 2019-01-15 DIAGNOSIS — E66.812 CLASS 2 SEVERE OBESITY WITH SERIOUS COMORBIDITY AND BODY MASS INDEX (BMI) OF 37.0 TO 37.9 IN ADULT, UNSPECIFIED OBESITY TYPE (H): Primary | ICD-10-CM

## 2019-01-15 RX ORDER — NALTREXONE HYDROCHLORIDE 50 MG/1
TABLET, FILM COATED ORAL
Qty: 90 TABLET | Refills: 2 | Status: SHIPPED | OUTPATIENT
Start: 2019-01-15 | End: 2022-03-03

## 2019-01-15 ASSESSMENT — ENCOUNTER SYMPTOMS
HEMOPTYSIS: 0
HALLUCINATIONS: 0
NECK PAIN: 0
POOR WOUND HEALING: 0
SKIN CHANGES: 0
NUMBNESS: 0
PARALYSIS: 0
RECTAL BLEEDING: 0
FLANK PAIN: 0
TROUBLE SWALLOWING: 0
NAIL CHANGES: 0
SPUTUM PRODUCTION: 0
JAUNDICE: 0
TREMORS: 0
RESPIRATORY PAIN: 0
ALTERED TEMPERATURE REGULATION: 0
TACHYCARDIA: 0
EXTREMITY NUMBNESS: 0
DECREASED CONCENTRATION: 0
FATIGUE: 0
LIGHT-HEADEDNESS: 0
BOWEL INCONTINENCE: 0
ABDOMINAL PAIN: 0
NIGHT SWEATS: 0
SORE THROAT: 0
SWOLLEN GLANDS: 0
HEMATURIA: 0
COUGH DISTURBING SLEEP: 0
LEG PAIN: 0
SINUS CONGESTION: 0
LEG SWELLING: 0
CONSTIPATION: 0
MYALGIAS: 0
MUSCLE CRAMPS: 0
DYSURIA: 0
WHEEZING: 0
BLOATING: 0
PALPITATIONS: 0
SINUS PAIN: 0
DYSPNEA ON EXERTION: 0
FEVER: 0
SNORES LOUDLY: 0
DIFFICULTY URINATING: 0
VOMITING: 0
WEIGHT LOSS: 0
SHORTNESS OF BREATH: 0
RECTAL PAIN: 0
INCREASED ENERGY: 0
BACK PAIN: 0
HEARTBURN: 0
PANIC: 0
NERVOUS/ANXIOUS: 0
POSTURAL DYSPNEA: 0
JOINT SWELLING: 0
DIARRHEA: 0
INSOMNIA: 0
EYE WATERING: 0
BRUISES/BLEEDS EASILY: 0
TINGLING: 0
EYE REDNESS: 0
LOSS OF CONSCIOUSNESS: 0
POLYDIPSIA: 0
NAUSEA: 0
WEIGHT GAIN: 0
DISTURBANCES IN COORDINATION: 0
ORTHOPNEA: 0
EYE PAIN: 0
ARTHRALGIAS: 0
EYE IRRITATION: 0
BREAST MASS: 0
TASTE DISTURBANCE: 0
DEPRESSION: 0
HYPERTENSION: 0
HYPOTENSION: 0
CHILLS: 0
NECK MASS: 0
SPEECH CHANGE: 0
COUGH: 0
BREAST PAIN: 0
EXERCISE INTOLERANCE: 0
SYNCOPE: 0
STIFFNESS: 0
SEIZURES: 0
WEAKNESS: 0
DECREASED APPETITE: 0
MEMORY LOSS: 0
MUSCLE WEAKNESS: 0
HOARSE VOICE: 0
DECREASED LIBIDO: 0
CLAUDICATION: 0
SLEEP DISTURBANCES DUE TO BREATHING: 0
DIZZINESS: 0
HOT FLASHES: 0
BLOOD IN STOOL: 0
POLYPHAGIA: 0
DOUBLE VISION: 0
HEADACHES: 0
SMELL DISTURBANCE: 0

## 2019-01-15 ASSESSMENT — MIFFLIN-ST. JEOR: SCORE: 1758.07

## 2019-01-15 NOTE — NURSING NOTE
"  Chief Complaint   Patient presents with     Weight Problem     RMWM     Vitals:    01/15/19 1546   BP: 145/83   Pulse: 89   SpO2: 100%   Weight: 109.5 kg (241 lb 8 oz)   Height: 1.702 m (5' 7\")     Body mass index is 37.82 kg/m .  Carlos De La Cruz CMA    "

## 2019-01-15 NOTE — LETTER
"1/15/2019       RE: Alla Shrestha  [address]  [address]  [place] [address]     Dear Colleague,    Thank you for referring your patient, Alla Shrestha, to the [facility] MEDICAL WEIGHT MANAGEMENT at [facility]. Please see a copy of my visit note below.        Return Medical Weight Management Note     Alla Shrestha  MRN:  [id number]  :  1970  RHONDA:   1/15/2019    Dear PCP    I had the pleasure of seeing your patient Alla Shrestha. 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    She required to weigh 224 lbs in order to get kidney transplant.    INTERVAL HISTORY:  Last seen 10/9/2018. She gained 6 lbs in 3 months. She said that she had to stop topiramate due to jitteriness at night and insomnia. She is on peritoneal dialysis from 7 pm to 7 am daily. She feels that she has a lot of restriction in her diet due to high phosphorus.    She was previously on naltrexone and it helped curbing some of appetite.     CURRENT WEIGHT:   241 lbs 8 oz    Wt Readings from Last 4 Encounters:   01/15/19 109.5 kg (241 lb 8 oz)   10/09/18 106.6 kg (235 lb 1.6 oz)   18 115 kg (253 lb 9.6 oz)   18 107 kg (236 lb)       Height:  5' 7\"  Body Mass Index:  Body mass index is 37.82 kg/m .  Vitals:  /83   Pulse 89   Ht 1.702 m (5' 7\")   Wt 109.5 kg (241 lb 8 oz)   SpO2 100%   BMI 37.82 kg/m       Initial consult weight was 289 on 2017 at [facility] consult  Weight change since last seen on 10/9/2018 is up 6 pounds.   Total loss is 48 pounds.    Review of Systems     Constitutional:  Negative for fever, chills, weight loss, weight gain, fatigue, decreased appetite, night sweats, recent stressors, height gain, height loss, post-operative complications, incisional pain, hallucinations, increased energy, " hyperactivity and confused.   HENT:  Negative for ear pain, hearing loss, tinnitus, nosebleeds, trouble swallowing, hoarse voice, mouth sores, sore throat, ear discharge, tooth pain, gum tenderness, taste disturbance, smell disturbance, hearing aid, bleeding gums, dry mouth, sinus pain, sinus congestion and neck mass.    Eyes:  Negative for double vision, pain, redness, eye pain, decreased vision, eye watering, eye bulging, eye dryness, flashing lights, spots, floaters, strabismus, tunnel vision, jaundice and eye irritation.   Respiratory:   Negative for cough, hemoptysis, sputum production, shortness of breath, wheezing, sleep disturbances due to breathing, snores loudly, respiratory pain, dyspnea on exertion, cough disturbing sleep and postural dyspnea.    Cardiovascular:  Negative for chest pain, dyspnea on exertion, palpitations, orthopnea, claudication, leg swelling, fingers/toes turn blue, hypertension, hypotension, syncope, history of heart murmur, chest pain on exertion, chest pain at rest, pacemaker, few scattered varicosities, leg pain, sleep disturbances due to breathing, tachycardia, light-headedness, exercise intolerance and edema.   Gastrointestinal:  Negative for heartburn, nausea, vomiting, abdominal pain, diarrhea, constipation, blood in stool, melena, rectal pain, bloating, hemorrhoids, bowel incontinence, jaundice, rectal bleeding, coffee ground emesis and change in stool.   Genitourinary:  Negative for bladder incontinence, dysuria, urgency, hematuria, flank pain, vaginal discharge, difficulty urinating, genital sores, dyspareunia, decreased libido, nocturia, voiding less frequently, arousal difficulty, abnormal vaginal bleeding, excessive menstruation, menstrual changes, hot flashes, vaginal dryness and postmenopausal bleeding.   Musculoskeletal:  Negative for myalgias, back pain, joint swelling, arthralgias, stiffness, muscle cramps, neck pain, bone pain, muscle weakness and fracture.   Skin:   Positive for itching and rash. Negative for nail changes, poor wound healing, hair changes, skin changes, acne, warts, poor wound healing, scarring, flaky skin, Raynaud's phenomenon, sensitivity to sunlight and skin thickening.   Neurological:  Negative for dizziness, tingling, tremors, speech change, seizures, loss of consciousness, weakness, light-headedness, numbness, headaches, disturbances in coordination, extremity numbness, memory loss, difficulty walking and paralysis.   Endo/Heme:  Negative for anemia, swollen glands and bruises/bleeds easily.   Psychiatric/Behavioral:  Negative for depression, hallucinations, memory loss, decreased concentration, mood swings and panic attacks.    Breast:  Negative for breast discharge, breast mass, breast pain and nipple retraction.   Endocrine:  Negative for altered temperature regulation, polyphagia, polydipsia, unwanted hair growth and change in facial hair.      MEDICATIONS:   Current Outpatient Medications   Medication Sig Dispense Refill     amLODIPine (NORVASC) 10 MG tablet        atorvastatin (LIPITOR) 40 MG tablet        calcitRIOL (ROCALTROL) 0.25 MCG capsule Take 0.25 mcg by mouth daily.       traZODone (DESYREL) 50 MG tablet Take 1 tablet by mouth At Bedtime.       lisinopril (PRINIVIL,ZESTRIL) 40 MG tablet Take 1 tablet (40 mg) by mouth daily 30 tablet      metoprolol (LOPRESSOR) 25 MG tablet Take 25 mg by mouth 2 times daily.       simvastatin (ZOCOR) 20 MG tablet Take 1 tablet by mouth daily.       sodium bicarbonate 650 MG tablet Take 325 mg by mouth 3 times daily.       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    Now on peritoneal dialysis  Stopped topiramate due to jitteriness at night and insomnia.   Motivated to lose weight and continue with diet plan    PLAN:  - start naltrexone 25-50 mg daily at midday  - encourage her to have more protein  - continue diet plan  - encourage exercise as tolerated    FOLLOW-UP:    RTC 3 months    Time: 15 min spent on evaluation, management, counseling, education, & motivational interviewing with greater than 50 % of the total time was spent on counseling and coordinating care    Sincerely,    Sunshine Turner MD

## 2019-01-15 NOTE — PATIENT INSTRUCTIONS
- try naltrexone 1/2 pill at midday. If you tolerate, go up to 1 pill at midday  - have telephone visit with Nkechi Payne in 4 weeks  - see me in 3 months    If you have any questions, please do not hesitate to call Weight management clinic at [phone] or [phone].    Sincerely,    Sunshine Turner MD  Endocrinology

## 2019-01-23 ENCOUNTER — TELEPHONE (OUTPATIENT)
Dept: TRANSPLANT | Facility: CLINIC | Age: 49
End: 2019-01-23

## 2019-01-23 NOTE — TELEPHONE ENCOUNTER
Transplant Social Work Services Phone Call        Data: Chem Dep Eval Resources  Intervention: Received in-basket message from patient's coordinator that patient was requesting resources on where to get a chemical dependency evaluation done. Contacted patient. Informed patient she should contact her insurance to see where an evaluation should be covered.   Assessment: Patient has a history of alcohol abuse and transplant committee recommends patient get a chem dep evaluation. Patient has been informed of this before (see previous  telephone conversations).  Education provided by : Chem Dep  Plan: Patient to update her transplant coordinator once evaluation is complete. Encouraged patient to contact this writer with further questions.      Thelma Harman, [facility]    Kidney/Pancreas/Auto Islet Transplant Programs

## 2019-01-23 NOTE — TELEPHONE ENCOUNTER
Called pt today and spoke with her. Pt stating she is continuing to lose weight and is currently down to 230 pounds, equals BMI of 36. Reviewed items needing to be completed - wt loss, 6 months compliance which time frame ended 12/2018 so pt needs to have letters sent from nephrologist/PCP to our office, chem dep.  Pt stating she does not know how to set up Chem Dep appt, reviewed that Thelma MCKENZIE did speak with her in June 2018 and August 2018 about this, but happy to have Thelma call her again. Pt requesting my Transplant Eval Summary Letter from August 2018 be remailed to her - told her will do.  Pt stating her current nephrologist is Dr. Magdiel Rothman and she has been doing home dialysis now for the last 2 months which is going well. Pt states dialysis unit is [facility].  Pt will call me when BMI is 35. Pt expressed very good understanding of all and was in good agreement with the plan.     Staff message to Thelma MCKENZIE to call pt as above. Staff message to  to mail out Transplant Eval Summary Letter again to patient asap.      Called dialysis unit who confirmed pt is now on peritoneal dialysis, requested [id number] and last 2 provider notes be faxed to our office - they will do so.

## 2019-01-31 ENCOUNTER — TELEPHONE (OUTPATIENT)
Dept: TRANSPLANT | Facility: CLINIC | Age: 49
End: 2019-01-31

## 2019-02-12 ENCOUNTER — ALLIED HEALTH/NURSE VISIT (OUTPATIENT)
Dept: PHARMACY | Facility: CLINIC | Age: 49
End: 2019-02-12
Payer: COMMERCIAL

## 2019-02-12 DIAGNOSIS — E66.812 CLASS 2 SEVERE OBESITY WITH SERIOUS COMORBIDITY AND BODY MASS INDEX (BMI) OF 37.0 TO 37.9 IN ADULT, UNSPECIFIED OBESITY TYPE (H): Primary | ICD-10-CM

## 2019-02-12 DIAGNOSIS — E66.01 CLASS 2 SEVERE OBESITY WITH SERIOUS COMORBIDITY AND BODY MASS INDEX (BMI) OF 37.0 TO 37.9 IN ADULT, UNSPECIFIED OBESITY TYPE (H): Primary | ICD-10-CM

## 2019-02-12 DIAGNOSIS — N04.20 NEPHROTIC SYNDROME WITH MEMBRANOUS GLOMERULONEPHRITIS: ICD-10-CM

## 2019-02-12 PROCEDURE — 99207 ZZC NO CHARGE LOS: CPT | Performed by: PHARMACIST

## 2019-02-12 NOTE — PROGRESS NOTES
"Therapy Management:                                                    Alla Shrestha is a 48 year old female called for a therapy management visit.  She was referred to me from Dr. Gonsalez.     Reason for Consult: Naltrexone start follow up.     Discussion: Obesity: Current medication(s) include: Naltrexone 50 mg once daily in AM. Medication was started on 1/15 by Dr. Sunshine Turner . Patient is experiencing the follow side effects: None. She feels like the medication works throughout the day. 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 She is required to weight 224 lb in order to get kidney transplant. Patient states that the \"jitteriness\" that she experienced with topiramate has slighted dissipated, but is still present and feels it may not have been from medication. Patient describes \"jitteriness\" as feeling that legs, and at times arms, need to move especially at night when resting at night.   Diet/Eating Habits: Patient reports that her appetite has lessened since starting naltrexone. She is eating 2-3 meals per day. Finding that she is eating less portion sizes, this started after initial consult with [facility] clinic, but she finds that the naltexone has assisted in lowering this even more.     Patient is following up with Dr. Sunshine Turner  on 4/23/19.   Initial consult weight 5/23/17 was 289 lb   Weight when naltrexone started, 1/15/19: 241 lb 8 oz. BMI 37.82 kg/m^2  Goal weight: 224 lb     Dialysis of Drugs suggests naltrexone \" Indicates that dialysis does not have a clinically important effect on plasma clearance. Supplemental dosing is usually not required\". Discussed with patient that if \"jitteriness\" at night continues to bring up with PCP or Nephrologist as it sounds similar to that of RLS. Discussed MOA, safety and efficacy of naltrexone.     AST   Date Value Ref Range Status   06/11/2018 43 0 - 45 " U/L Final     Lab Results   Component Value Date    ALT 49 06/11/2018       Plan:  1. Continue naltrexone 50 mg daily in AM.  2. Patient to follow up with Dr. Gonsalez in 2 months and call with patient related questions if needed.     Lauren Turner Bloch, PharmD  Medication Therapy Management Pharmacist   [facility]   Phone: [phone]

## 2019-04-10 ENCOUNTER — OFFICE VISIT - HEALTHEAST (OUTPATIENT)
Dept: MIDWIFE SERVICES | Facility: CLINIC | Age: 49
End: 2019-04-10

## 2019-04-10 DIAGNOSIS — N76.0 BV (BACTERIAL VAGINOSIS): ICD-10-CM

## 2019-04-10 DIAGNOSIS — B96.89 BV (BACTERIAL VAGINOSIS): ICD-10-CM

## 2019-04-10 DIAGNOSIS — Z11.3 SCREEN FOR STD (SEXUALLY TRANSMITTED DISEASE): ICD-10-CM

## 2019-04-10 ASSESSMENT — MIFFLIN-ST. JEOR: SCORE: 1759.41

## 2019-04-11 LAB
C TRACH DNA SPEC QL PROBE+SIG AMP: NEGATIVE
N GONORRHOEA DNA SPEC QL NAA+PROBE: NEGATIVE
T PALLIDUM AB SER QL: NEGATIVE

## 2019-09-23 ENCOUNTER — TRANSFERRED RECORDS (OUTPATIENT)
Dept: HEALTH INFORMATION MANAGEMENT | Facility: CLINIC | Age: 49
End: 2019-09-23

## 2019-09-24 ENCOUNTER — TELEPHONE (OUTPATIENT)
Dept: TRANSPLANT | Facility: CLINIC | Age: 49
End: 2019-09-24

## 2019-09-24 NOTE — TELEPHONE ENCOUNTER
Patient's SW from the patient's dialysis would an update on the patient status faxed to [phone].

## 2019-10-16 ENCOUNTER — TRANSFERRED RECORDS (OUTPATIENT)
Dept: HEALTH INFORMATION MANAGEMENT | Facility: CLINIC | Age: 49
End: 2019-10-16

## 2019-10-16 NOTE — TELEPHONE ENCOUNTER
Chart reviewed.  -Goal weiight 221 #- lat recorded weight was 241# on 1/15/19. Has not done any further follow-up with Weight loss management clinic.  -There is no compliance contract in her chart- needed to demonstrate 6 months of medical compliance from 6/30/18- 12/30/18. There are no nots from her           PCP, nephrologist not Chem Dep regarding this.  - We have no indication that she attended a Chem Dep program.   Left a general message on her cell voice mail  Asking her to call us with an update.  Did speak with her PD nurse who reports that Alla is getting betetr taking her medication - phosphate binders in particular.   She is a poor communicator  Fairly compliant with her nephrology appointments.  The PD nurse will fax us the last 2 provider notes.

## 2019-10-21 ENCOUNTER — TELEPHONE (OUTPATIENT)
Dept: TRANSPLANT | Facility: CLINIC | Age: 49
End: 2019-10-21

## 2019-10-21 NOTE — TELEPHONE ENCOUNTER
Reviewed dialysis provider notes from this month and last. Sept weight is 231 pounds, Oct 16th weight is 253 pounds.   231 pounds = BMI of 36. 253 pounds = BMI 39.5.  Height is 5 foot 7 inches. Has no showed for Weight Loss Management appts that were scheduled for her. Called pt today and LM on her cell VM asking her to return my call .

## 2019-11-04 ENCOUNTER — TELEPHONE (OUTPATIENT)
Dept: TRANSPLANT | Facility: CLINIC | Age: 49
End: 2019-11-04

## 2019-11-04 NOTE — LETTER
November 4, 2019  Alla Shrestha  [address] [place] [id number]      Dear Ms. Shrestha,   The purpose of this letter is to let you know that on  the [facility] Multi-Disciplinary Selection Team reviewed the results of your transplant evaluation on 10/30/3019.  Based on the results of your evaluation and the selection criteria used by our program , the decision was made to not list you on the kidney transplant list at this time. This is because your Body Mass Index ( BMI ) remains greater then 35. You needs to weight 222 pounds to equal a Body Mass Index ( BMI ) of 35.  Please call me as soon as your weight is down to 228 pounds so I can make arrangements for any medical updating appointments you may require then as you continue to lose weight down to 222 pounds.   Important things you should know:    If you would like to discuss the decision, or if your medical status changes you may schedule a return visits with your doctor by calling [phone] and asking to speak to your transplant coordinator.    We recommend that you continue to follow up with your primary care doctor in order to manage your health concerns.  Enclosed is a letter from [employer] which describes the services offered to patients by [employer] and the [facility].  Thank you for allowing us to participate in your care.  We wish you well.  Sincerely,    Carolin Underwood RN BSN Transplant Coordinator   Solid Organ Transplant  [facility]    Enclosures: [employer] Letter   Cc: [facility]; Betty Vazquez MD (PCP); Sunshine Turner MD; Michael Rothman MD

## 2019-11-04 NOTE — TELEPHONE ENCOUNTER
Called pt today on her cell # and LM on her VM to call me regarding the outcome of last week's Committee review. Also called her work number, unable to leave a message. Also called home number and reached a message stating the mailbox was full and could not accept messages.      Generated Declined as a Kidney Transplant Candidate Letter today in Epic - routed to ad min for mailing.

## 2020-01-07 ENCOUNTER — RECORDS - HEALTHEAST (OUTPATIENT)
Dept: ADMINISTRATIVE | Facility: OTHER | Age: 50
End: 2020-01-07

## 2020-01-07 ENCOUNTER — HOSPITAL ENCOUNTER (OUTPATIENT)
Dept: RADIOLOGY | Facility: CLINIC | Age: 50
Discharge: HOME | End: 2020-01-07
Attending: INTERNAL MEDICINE

## 2020-01-07 DIAGNOSIS — T85.611D PERITONEAL DIALYSIS CATHETER DYSFUNCTION, SUBSEQUENT ENCOUNTER (H): ICD-10-CM

## 2020-03-25 ENCOUNTER — OFFICE VISIT - HEALTHEAST (OUTPATIENT)
Dept: INTERNAL MEDICINE | Facility: CLINIC | Age: 50
End: 2020-03-25

## 2020-03-25 DIAGNOSIS — G47.00 INSOMNIA, UNSPECIFIED TYPE: ICD-10-CM

## 2020-03-25 DIAGNOSIS — J45.20 MILD INTERMITTENT ASTHMA IN ADULT WITHOUT COMPLICATION: ICD-10-CM

## 2020-03-25 DIAGNOSIS — Z12.31 ENCOUNTER FOR SCREENING MAMMOGRAM FOR BREAST CANCER: ICD-10-CM

## 2020-03-25 DIAGNOSIS — N89.8 VAGINAL DISCHARGE: ICD-10-CM

## 2020-03-25 DIAGNOSIS — N18.6 ESRD (END STAGE RENAL DISEASE) (H): ICD-10-CM

## 2020-03-31 ENCOUNTER — COMMUNICATION - HEALTHEAST (OUTPATIENT)
Dept: INTERNAL MEDICINE | Facility: CLINIC | Age: 50
End: 2020-03-31

## 2020-03-31 DIAGNOSIS — J45.20 MILD INTERMITTENT ASTHMA IN ADULT WITHOUT COMPLICATION: ICD-10-CM

## 2020-11-17 ENCOUNTER — RECORDS - HEALTHEAST (OUTPATIENT)
Dept: ADMINISTRATIVE | Facility: OTHER | Age: 50
End: 2020-11-17

## 2020-11-17 ENCOUNTER — VIRTUAL VISIT (OUTPATIENT)
Dept: ENDOCRINOLOGY | Facility: CLINIC | Age: 50
End: 2020-11-17
Payer: COMMERCIAL

## 2020-11-17 VITALS — WEIGHT: 241 LBS | BODY MASS INDEX: 37.83 KG/M2 | HEIGHT: 67 IN

## 2020-11-17 DIAGNOSIS — E66.01 CLASS 2 SEVERE OBESITY WITH SERIOUS COMORBIDITY AND BODY MASS INDEX (BMI) OF 37.0 TO 37.9 IN ADULT, UNSPECIFIED OBESITY TYPE (H): Primary | ICD-10-CM

## 2020-11-17 DIAGNOSIS — E66.812 CLASS 2 SEVERE OBESITY WITH SERIOUS COMORBIDITY AND BODY MASS INDEX (BMI) OF 37.0 TO 37.9 IN ADULT, UNSPECIFIED OBESITY TYPE (H): Primary | ICD-10-CM

## 2020-11-17 PROCEDURE — 99213 OFFICE O/P EST LOW 20 MIN: CPT | Mod: 95 | Performed by: INTERNAL MEDICINE

## 2020-11-17 RX ORDER — LIRAGLUTIDE 6 MG/ML
INJECTION SUBCUTANEOUS
Qty: 18 ML | Refills: 3 | Status: SHIPPED | OUTPATIENT
Start: 2020-11-17 | End: 2022-03-03

## 2020-11-17 RX ORDER — CALCIUM ACETATE 667 MG/1
667 CAPSULE ORAL
COMMUNITY
End: 2023-01-27

## 2020-11-17 ASSESSMENT — MIFFLIN-ST. JEOR: SCORE: 1745.8

## 2020-11-17 ASSESSMENT — PAIN SCALES - GENERAL: PAINLEVEL: NO PAIN (0)

## 2020-11-17 NOTE — LETTER
"11/17/2020       RE: Alla Shrestha  [address] [place] [address]     Dear Colleague,    Thank you for referring your patient, Alla Shrestha, to the [facility] WEIGHT MANAGEMENT CLINIC [place] at [facility]. Please see a copy of my visit note below.    Alla Shrestha is a 50 year old female who is being evaluated via a billable video visit.      The patient has been notified of following:     \"This video visit will be conducted via a call between you and your physician/provider. We have found that certain health care needs can be provided without the need for an in-person physical exam.  This service lets us provide the care you need with a video conversation.  If a prescription is necessary we can send it directly to your pharmacy.  If lab work is needed we can place an order for that and you can then stop by our lab to have the test done at a later time.    Video visits are billed at different rates depending on your insurance coverage.  Please reach out to your insurance provider with any questions.    If during the course of the call the physician/provider feels a video visit is not appropriate, you will not be charged for this service.\"    Patient has given verbal consent for Video visit? Yes  How would you like to obtain your AVS? Mail a copy  If you are dropped from the video visit, the video invite should be resent to: Send to e-mail at: [email or web address]  Will anyone else be joining your video visit? No      During this virtual visit the patient is located in [place], patient verifies this as the location during the entirety of this visit.     Video-Visit Details    Type of service:  Video Visit    Video Start Time: 1116 AM  Video End Time: 1132 AM    Originating Location (pt. Location): Home    Distant Location (provider location):  [facility] WEIGHT MANAGEMENT [facility]     Platform used for Video Visit: " "[facility]        Return Medical Weight Management Note     Alla Shrestha  MRN:  [id number]  :  1970  RHONDA:   2020    Dear PCP    I had the pleasure of seeing your patient Alla Shrestha. 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    She required to weigh 224 lbs in order to get kidney transplant.    She could not tolerate topiramate due to jitteriness at night and insomnia.    INTERVAL HISTORY:  Last seen 1/15/2019.     She did have peritonitis from peritoneal dialysis so it was converted to hemodialysis. She has been on hemodialysis for the past 2 months from Monday-Wednesday and Friday in the evening.    At the last visit, she was on naltrexone. However, she did not think it helps drastically. She would like to work on weight loss again with the goal to be able to get kidney transplant.    She feels that her main barrier is eating high carb meal or snack during the day.     Exercise: she just got the treadmill and is planning to use it regularly    CURRENT WEIGHT:   241 lbs 0 oz    Wt Readings from Last 4 Encounters:   20 109.3 kg (241 lb)   01/15/19 109.5 kg (241 lb 8 oz)   10/09/18 106.6 kg (235 lb 1.6 oz)   18 115 kg (253 lb 9.6 oz)       Height:  5' 7\"  Body Mass Index:  Body mass index is 37.75 kg/m .  Vitals:  Ht 1.702 m (5' 7\")   Wt 109.3 kg (241 lb)   BMI 37.75 kg/m      Initial consult weight was 289 on 2017 at [facility] consult  Weight change since last seen on 1/15/2019 is down 0 pounds.   Total loss is 48 pounds.    [facility] Return Questionnaire    Question 2020  9:53 AM CST - Filed by Becky Miner   I have made the following changes to my diet since my last visit: none   With regards to my diet, I am still struggling with:    I have made the following changes to my activity/exercise since my last " visit: none   With regards to my activity/exercise, I am still struggling with: get back on track   Which weight loss medications are you currently taking on a regular basis?  None   If you are not taking a weight loss medication that was prescribed to you, please indicate why: Other   Are you having any side effects from the weight loss medication that we have prescribed you? No   If you are having side effects please describe:          MEDICATIONS:   Current Outpatient Medications   Medication Sig Dispense Refill     atorvastatin (LIPITOR) 40 MG tablet        calcitRIOL (ROCALTROL) 0.25 MCG capsule Take 0.25 mcg by mouth daily.       calcium acetate (PHOSLO) 667 MG CAPS capsule Take 667 mg by mouth 3 times daily (with meals)       traZODone (DESYREL) 50 MG tablet Take 1 tablet by mouth At Bedtime.       amLODIPine (NORVASC) 10 MG tablet        lisinopril (PRINIVIL,ZESTRIL) 40 MG tablet Take 1 tablet (40 mg) by mouth daily 30 tablet      metoprolol (LOPRESSOR) 25 MG tablet Take 25 mg by mouth 2 times daily.       naltrexone (DEPADE/REVIA) 50 MG tablet Take 1/2 tablet.  Time it one to two hours prior to worst cravings.  Then increase to one full tablet as instructed. Max dose 1 pill per day 90 tablet 2     simvastatin (ZOCOR) 20 MG tablet Take 1 tablet by mouth daily.       sodium bicarbonate 650 MG tablet Take 325 mg by mouth 3 times daily.           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    Now on hemodialysis  Could not tolerate topiramate due to jitteriness at night and insomnia.   Did not think that naltrexone helped.  Motivated to lose weight and continue with diet plan in order to get kidney transplant.    PLAN:  - consider GLP-1 agonist -- Victoza titration from 0.6 mg up to 1.2 mg  daily  - encourage her to have more protein and fruit  - continue diet plan  - encourage exercise as tolerated    FOLLOW-UP:    Follow up with Lauren Bloch in 1 month  Follow up with me in 2 months    Time: 15 min spent on evaluation, management, counseling, education, & motivational interviewing with greater than 50 % of the total time was spent on counseling and coordinating care    Sincerely,    Sunshine Turner MD

## 2020-11-17 NOTE — PATIENT INSTRUCTIONS
- see Lauren Bloch in 1 month  - see Dr.Tasma THOMSON in 2 months    If you have any questions, please do not hesitate to call Weight management clinic at [phone] or [phone]    Sincerely,    Sunshine Turner MD  Endocrinology

## 2020-11-17 NOTE — PROGRESS NOTES
"Alla Shrestha is a 50 year old female who is being evaluated via a billable video visit.      The patient has been notified of following:     \"This video visit will be conducted via a call between you and your physician/provider. We have found that certain health care needs can be provided without the need for an in-person physical exam.  This service lets us provide the care you need with a video conversation.  If a prescription is necessary we can send it directly to your pharmacy.  If lab work is needed we can place an order for that and you can then stop by our lab to have the test done at a later time.    Video visits are billed at different rates depending on your insurance coverage.  Please reach out to your insurance provider with any questions.    If during the course of the call the physician/provider feels a video visit is not appropriate, you will not be charged for this service.\"    Patient has given verbal consent for Video visit? Yes  How would you like to obtain your AVS? Mail a copy  If you are dropped from the video visit, the video invite should be resent to: Send to e-mail at: [email or web address]  Will anyone else be joining your video visit? No      During this virtual visit the patient is located in [place], patient verifies this as the location during the entirety of this visit.     Video-Visit Details    Type of service:  Video Visit    Video Start Time: 1116 AM  Video End Time: 1132 AM    Originating Location (pt. Location): [place]    Distant Location (provider location):  [facility] [place]     Platform used for Video Visit: [facility] Weight Management Note     Alla Shrestha  MRN:  [id number]  :  1970  RHONDA:   2020    Dear PCP    I had the pleasure of seeing your patient Alla Shrestha. 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    She required to weigh 224 lbs in order to get kidney transplant.    She could not tolerate topiramate due to jitteriness at night and insomnia.    INTERVAL HISTORY:  Last seen 1/15/2019.     She did have peritonitis from peritoneal dialysis so it was converted to hemodialysis. She has been on hemodialysis for the past 2 months from Monday-Wednesday and Friday in the evening.    At the last visit, she was on naltrexone. However, she did not think it helps drastically. She would like to work on weight loss again with the goal to be able to get kidney transplant.    She feels that her main barrier is eating high carb meal or snack during the day.     Exercise: she just got the treadmill and is planning to use it regularly    CURRENT WEIGHT:   241 lbs 0 oz    Wt Readings from Last 4 Encounters:   11/17/20 109.3 kg (241 lb)   01/15/19 109.5 kg (241 lb 8 oz)   10/09/18 106.6 kg (235 lb 1.6 oz)   06/11/18 115 kg (253 lb 9.6 oz)       Height:  5' 7\"  Body Mass Index:  Body mass index is 37.75 kg/m .  Vitals:  Ht 1.702 m (5' 7\")   Wt 109.3 kg (241 lb)   BMI 37.75 kg/m      Initial consult weight was 289 on 5/23/2017 at [facility] consult  Weight change since last seen on 1/15/2019 is down 0 pounds.   Total loss is 48 pounds.    [facility] Return Questionnaire    Question 11/17/2020  9:53 AM CST - Filed by Becky Miner   I have made the following changes to my diet since my last visit: none   With regards to my diet, I am still struggling with:    I have made the following changes to my activity/exercise since my last visit: none   With regards to my activity/exercise, I am still struggling with: get back on track   Which weight loss medications are you currently taking on a regular basis?  None   If you are not taking a weight loss medication that was prescribed to you, please indicate why: Other   Are you having any side effects from the " weight loss medication that we have prescribed you? No   If you are having side effects please describe:          MEDICATIONS:   Current Outpatient Medications   Medication Sig Dispense Refill     atorvastatin (LIPITOR) 40 MG tablet        calcitRIOL (ROCALTROL) 0.25 MCG capsule Take 0.25 mcg by mouth daily.       calcium acetate (PHOSLO) 667 MG CAPS capsule Take 667 mg by mouth 3 times daily (with meals)       traZODone (DESYREL) 50 MG tablet Take 1 tablet by mouth At Bedtime.       amLODIPine (NORVASC) 10 MG tablet        lisinopril (PRINIVIL,ZESTRIL) 40 MG tablet Take 1 tablet (40 mg) by mouth daily 30 tablet      metoprolol (LOPRESSOR) 25 MG tablet Take 25 mg by mouth 2 times daily.       naltrexone (DEPADE/REVIA) 50 MG tablet Take 1/2 tablet.  Time it one to two hours prior to worst cravings.  Then increase to one full tablet as instructed. Max dose 1 pill per day 90 tablet 2     simvastatin (ZOCOR) 20 MG tablet Take 1 tablet by mouth daily.       sodium bicarbonate 650 MG tablet Take 325 mg by mouth 3 times daily.           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    Now on hemodialysis  Could not tolerate topiramate due to jitteriness at night and insomnia.   Did not think that naltrexone helped.  Motivated to lose weight and continue with diet plan in order to get kidney transplant.    PLAN:  - consider GLP-1 agonist -- Victoza titration from 0.6 mg up to 1.2 mg daily  - encourage her to have more protein and fruit  - continue diet plan  - encourage exercise as tolerated    FOLLOW-UP:    Follow up with Lauren Bloch in 1 month  Follow up with me in 2 months    Time: 15 min spent on evaluation, management, counseling, education, & motivational interviewing with greater than 50 % of the  total time was spent on counseling and coordinating care    Sincerely,    Sunshine Turner MD

## 2020-11-17 NOTE — NURSING NOTE
"Chief Complaint   Patient presents with     Follow Up     [facility] follow up       Vitals:    11/17/20 0948   Weight: 109.3 kg (241 lb)   Height: 1.702 m (5' 7\")       Body mass index is 37.75 kg/m .                            "

## 2020-11-18 ENCOUNTER — TELEPHONE (OUTPATIENT)
Dept: ENDOCRINOLOGY | Facility: CLINIC | Age: 50
End: 2020-11-18

## 2020-12-06 ENCOUNTER — HEALTH MAINTENANCE LETTER (OUTPATIENT)
Age: 50
End: 2020-12-06

## 2021-01-27 ENCOUNTER — COMMUNICATION - HEALTHEAST (OUTPATIENT)
Dept: INTERNAL MEDICINE | Facility: CLINIC | Age: 51
End: 2021-01-27

## 2021-01-27 ENCOUNTER — OFFICE VISIT - HEALTHEAST (OUTPATIENT)
Dept: INTERNAL MEDICINE | Facility: CLINIC | Age: 51
End: 2021-01-27

## 2021-01-27 DIAGNOSIS — E78.2 MIXED HYPERLIPIDEMIA: ICD-10-CM

## 2021-01-27 DIAGNOSIS — B35.3 TINEA PEDIS OF BOTH FEET: ICD-10-CM

## 2021-01-27 DIAGNOSIS — Z99.2 ESRD NEEDING DIALYSIS (H): ICD-10-CM

## 2021-01-27 DIAGNOSIS — N18.9 ANEMIA OF CHRONIC RENAL FAILURE, UNSPECIFIED CKD STAGE: ICD-10-CM

## 2021-01-27 DIAGNOSIS — Z12.11 COLON CANCER SCREENING: ICD-10-CM

## 2021-01-27 DIAGNOSIS — Z12.31 ENCOUNTER FOR SCREENING MAMMOGRAM FOR BREAST CANCER: ICD-10-CM

## 2021-01-27 DIAGNOSIS — L21.9 SEBORRHEIC DERMATITIS: ICD-10-CM

## 2021-01-27 DIAGNOSIS — D63.1 ANEMIA OF CHRONIC RENAL FAILURE, UNSPECIFIED CKD STAGE: ICD-10-CM

## 2021-01-27 DIAGNOSIS — N18.6 ESRD NEEDING DIALYSIS (H): ICD-10-CM

## 2021-01-27 DIAGNOSIS — Z71.6 ENCOUNTER FOR SMOKING CESSATION COUNSELING: ICD-10-CM

## 2021-05-24 ENCOUNTER — OFFICE VISIT - HEALTHEAST (OUTPATIENT)
Dept: INTERNAL MEDICINE | Facility: CLINIC | Age: 51
End: 2021-05-24

## 2021-05-24 DIAGNOSIS — J45.20 MILD INTERMITTENT ASTHMA IN ADULT WITHOUT COMPLICATION: ICD-10-CM

## 2021-05-24 DIAGNOSIS — Z99.2 ESRD NEEDING DIALYSIS (H): ICD-10-CM

## 2021-05-24 DIAGNOSIS — N18.6 ESRD NEEDING DIALYSIS (H): ICD-10-CM

## 2021-05-24 DIAGNOSIS — J01.00 ACUTE MAXILLARY SINUSITIS, RECURRENCE NOT SPECIFIED: ICD-10-CM

## 2021-05-24 ASSESSMENT — PATIENT HEALTH QUESTIONNAIRE - PHQ9: SUM OF ALL RESPONSES TO PHQ QUESTIONS 1-9: 0

## 2021-05-27 NOTE — PROGRESS NOTES
"Assessment:   1.  Vaginal irritation  2.  Bacterial vaginosis  3.  STD screening  4.  Undergoing peritoneal dialysis  (Complex chronic medical diagnoses followed by PCP Dr. Vazquez)     Plan:      1. Continue to follow-up with Dr. Vazquez as directed to manage chronic medical diagnoses  2. Blood tests: HIV, RPR.  Wet prep and GC/CT cultures obtained.    3.  STD prevention teaching and vaginal hygiene reviewed.  4. Contraception: Status post partial hysterectomy  5.  Prescription for metronidazole sent to patient's preferred pharmacy.  Verified this medication is safe and effective while considering peritoneal dialysis.    Total time spent with patient: 30 minutes, greater than 50% of which was spent counseling and coordinating care    Subjective:      Alla Shrestha is a 48 y.o. female who presents for vaginal irritation.  States that she was diagnosed with trichomonas and bacterial vaginosis 2018.  Approached  about this, and he denied infidelity.  She also denied any infidelity in the last 10 years.  She was treated for both trichomonas and bacterial vaginosis.  Her  was seen at a medical clinic and his testing revealed NEGATIVE Trichomonas \"and a few other sexually transmitted infections were also NEGATIVE\".  \"I have not felt right down there ever since, and we have not been intimate yet.\"  Requesting STD screening.        Immunization History   Administered Date(s) Administered     Influenza, inj, historic,unspecified 10/27/2017     Pneumo Polysac 23-V 2018     Tdap 2017       Gynecologic History  No LMP recorded. Patient has had a hysterectomy.  Contraception: status post hysterectomy    Cancer screening:   Last Pap: 8/3/2018. Results were: normal      OB History    Para Term  AB Living   1 1 1         SAB TAB Ectopic Multiple Live Births                  # Outcome Date GA Lbr Maik/2nd Weight Sex Delivery Anes PTL Lv   1 Term                Current Outpatient " Medications   Medication Sig Dispense Refill     acetaminophen (TYLENOL) 500 MG tablet Take 500 mg by mouth every 6 (six) hours as needed.       albuterol (PROAIR HFA;PROVENTIL HFA;VENTOLIN HFA) 90 mcg/actuation inhaler Inhale 2 puffs every 6 (six) hours as needed for wheezing. 1 each 0     aspirin 81 MG EC tablet Take 81 mg by mouth daily.       atorvastatin (LIPITOR) 40 MG tablet Take 1 tablet (40 mg total) by mouth daily. 30 tablet 11     bumetanide (BUMEX) 1 MG tablet Take 1 tablet (1 mg total) by mouth 2 (two) times a day at 9am and 6pm. 60 tablet 0     calcitriol (ROCALTROL) 0.25 MCG capsule Take 1 capsule (0.25 mcg total) by mouth daily. 30 capsule 0     traZODone (DESYREL) 50 MG tablet Take 50 mg by mouth at bedtime as needed for sleep.       varenicline (CHANTIX) 0.5 MG tablet Take 1 tablet (0.5 mg total) by mouth daily. 30 tablet 3     amLODIPine (NORVASC) 10 MG tablet        gentamicin (GARAMYCIN) 0.1 % cream        lisinopril (PRINIVIL,ZESTRIL) 40 MG tablet        metroNIDAZOLE (FLAGYL) 500 MG tablet Take 1 tablet (500 mg total) by mouth 2 (two) times a day for 7 days. 14 tablet 0     naltrexone (DEPADE) 50 mg tablet        VELPHORO 500 mg Chew chewable tablet        No current facility-administered medications for this visit.      Past Medical History:   Diagnosis Date     Anemia     has received iron infusions     Arthritis      Chronic kidney disease     Stage 4     Gout      Hay fever      HTN (hypertension)      Hyperlipidemia      Low hemoglobin      Mild intermittent asthma     pt. denies, but is listed by      Past Surgical History:   Procedure Laterality Date     HYSTERECTOMY  1999    fibroids     OOPHORECTOMY      listed by  /pt. thought they left her ovaries when she had hyst at young age     IL TOTAL KNEE ARTHROPLASTY  11/15/2017    Procedure: LEFT TOTAL KNEE ARTHROPLASTY;  Surgeon: Kyle Villanueva MD;  Location: [facility];  Service: Orthopedics     IL TOTAL KNEE  ARTHROPLASTY Right 12/20/2017    Procedure: RIGHT TOTAL KNEE ARTHROPLASTY;  Surgeon: Kyle Villanueva MD;  Location: [facility] OR;  Service: Orthopedics     RENAL BIOPSY  2010     Penicillins  Family History   Problem Relation Age of Onset     COPD Mother      Lung cancer Mother      Arthritis Mother      Heart disease Father      Hypertension Father      Heart attack Father      Alcohol abuse Father      COPD Brother      Alcohol abuse Brother      Asthma Brother      Hypertension Son      Stroke Maternal Grandmother      Heart disease Maternal Grandfather      Heart attack Maternal Grandfather      Cancer Paternal Grandmother      Vision loss Paternal Grandmother      Clotting disorder Paternal Grandfather      Cancer Paternal Grandfather      Hypertension Paternal Grandfather      Colon cancer Maternal Uncle      Cancer Paternal Uncle      Social History     Socioeconomic History     Marital status: Single     Spouse name: Not on file     Number of children: Not on file     Years of education: Not on file     Highest education level: Not on file   Occupational History     Occupation:      Employer: [employer]   Social Needs     Financial resource strain: Not on file     Food insecurity:     Worry: Not on file     Inability: Not on file     Transportation needs:     Medical: Not on file     Non-medical: Not on file   Tobacco Use     Smoking status: Current Every Day Smoker     Packs/day: 0.30     Years: 20.00     Pack years: 6.00     Types: Cigarettes     Smokeless tobacco: Never Used   Substance and Sexual Activity     Alcohol use: Yes     Comment: rare     Drug use: No     Sexual activity: Yes     Partners: Male   Lifestyle     Physical activity:     Days per week: Not on file     Minutes per session: Not on file     Stress: Not on file   Relationships     Social connections:     Talks on phone: Not on file     Gets together: Not on file     Attends Latter day service: Not on  "file     Active member of club or organization: Not on file     Attends meetings of clubs or organizations: Not on file     Relationship status: Not on file     Intimate partner violence:     Fear of current or ex partner: Not on file     Emotionally abused: Not on file     Physically abused: Not on file     Forced sexual activity: Not on file   Other Topics Concern     Not on file   Social History Narrative    Patient lives with her boyfriend.  She has a grown son and a dog.  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       Review of Systems  General:  Denies problem  Genitourinary: Please see HPI/subjective  Skin: Denies problem      Objective:         Vitals:    04/10/19 1346   BP: 110/66   Pulse: 80   Weight: (!) 244 lb (110.7 kg)   Height: 5' 7\" (1.702 m)       Physical Exam:  General Appearance: Alert, cooperative, no distress, appears older than stated age.  Very pleasant, and with appropriate eye contact and insight.  Pelvic:External genitalia normal without lesions or irritation.  Milky white discharge apparent at vaginal introitus.        "

## 2021-05-28 ASSESSMENT — ASTHMA QUESTIONNAIRES: ACT_TOTALSCORE: 25

## 2021-05-31 VITALS — BODY MASS INDEX: 43.47 KG/M2 | WEIGHT: 277 LBS | HEIGHT: 67 IN

## 2021-05-31 VITALS — HEIGHT: 67 IN | BODY MASS INDEX: 40.51 KG/M2 | WEIGHT: 258.1 LBS

## 2021-05-31 VITALS — WEIGHT: 245 LBS | HEIGHT: 67 IN | BODY MASS INDEX: 38.45 KG/M2

## 2021-05-31 VITALS — BODY MASS INDEX: 41.35 KG/M2 | WEIGHT: 264 LBS

## 2021-05-31 VITALS — WEIGHT: 277.25 LBS | HEIGHT: 67 IN | BODY MASS INDEX: 43.52 KG/M2

## 2021-05-31 VITALS — WEIGHT: 261.6 LBS | BODY MASS INDEX: 40.97 KG/M2

## 2021-06-01 VITALS — WEIGHT: 231.19 LBS | HEIGHT: 67 IN | BODY MASS INDEX: 36.29 KG/M2

## 2021-06-01 VITALS — WEIGHT: 240 LBS | BODY MASS INDEX: 37.59 KG/M2

## 2021-06-02 VITALS — BODY MASS INDEX: 38.3 KG/M2 | WEIGHT: 244 LBS | HEIGHT: 67 IN

## 2021-06-02 NOTE — PROGRESS NOTES
"    Return Medical Weight Management Note     Alla Shrestha  MRN:  [id number]  :  1970  RHONDA:   1/15/2019    Dear PCP    I had the pleasure of seeing your patient Alla Shrestha. 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    She required to weigh 224 lbs in order to get kidney transplant.    INTERVAL HISTORY:  Last seen 10/9/2018. She gained 6 lbs in 3 months. She said that she had to stop topiramate due to jitteriness at night and insomnia. She is on peritoneal dialysis from 7 pm to 7 am daily. She feels that she has a lot of restriction in her diet due to high phosphorus.    She was previously on naltrexone and it helped curbing some of appetite.     CURRENT WEIGHT:   241 lbs 8 oz    Wt Readings from Last 4 Encounters:   01/15/19 109.5 kg (241 lb 8 oz)   10/09/18 106.6 kg (235 lb 1.6 oz)   18 115 kg (253 lb 9.6 oz)   18 107 kg (236 lb)       Height:  5' 7\"  Body Mass Index:  Body mass index is 37.82 kg/m .  Vitals:  /83   Pulse 89   Ht 1.702 m (5' 7\")   Wt 109.5 kg (241 lb 8 oz)   SpO2 100%   BMI 37.82 kg/m      Initial consult weight was 289 on 2017 at [facility] consult  Weight change since last seen on 10/9/2018 is up 6 pounds.   Total loss is 48 pounds.    Review of Systems     Constitutional:  Negative for fever, chills, weight loss, weight gain, fatigue, decreased appetite, night sweats, recent stressors, height gain, height loss, post-operative complications, incisional pain, hallucinations, increased energy, hyperactivity and confused.   HENT:  Negative for ear pain, hearing loss, tinnitus, nosebleeds, trouble swallowing, hoarse voice, mouth sores, sore throat, ear discharge, tooth pain, gum tenderness, taste disturbance, smell disturbance, hearing aid, bleeding gums, dry mouth, sinus pain, sinus congestion " and neck mass.    Eyes:  Negative for double vision, pain, redness, eye pain, decreased vision, eye watering, eye bulging, eye dryness, flashing lights, spots, floaters, strabismus, tunnel vision, jaundice and eye irritation.   Respiratory:   Negative for cough, hemoptysis, sputum production, shortness of breath, wheezing, sleep disturbances due to breathing, snores loudly, respiratory pain, dyspnea on exertion, cough disturbing sleep and postural dyspnea.    Cardiovascular:  Negative for chest pain, dyspnea on exertion, palpitations, orthopnea, claudication, leg swelling, fingers/toes turn blue, hypertension, hypotension, syncope, history of heart murmur, chest pain on exertion, chest pain at rest, pacemaker, few scattered varicosities, leg pain, sleep disturbances due to breathing, tachycardia, light-headedness, exercise intolerance and edema.   Gastrointestinal:  Negative for heartburn, nausea, vomiting, abdominal pain, diarrhea, constipation, blood in stool, melena, rectal pain, bloating, hemorrhoids, bowel incontinence, jaundice, rectal bleeding, coffee ground emesis and change in stool.   Genitourinary:  Negative for bladder incontinence, dysuria, urgency, hematuria, flank pain, vaginal discharge, difficulty urinating, genital sores, dyspareunia, decreased libido, nocturia, voiding less frequently, arousal difficulty, abnormal vaginal bleeding, excessive menstruation, menstrual changes, hot flashes, vaginal dryness and postmenopausal bleeding.   Musculoskeletal:  Negative for myalgias, back pain, joint swelling, arthralgias, stiffness, muscle cramps, neck pain, bone pain, muscle weakness and fracture.   Skin:  Positive for itching and rash. Negative for nail changes, poor wound healing, hair changes, skin changes, acne, warts, poor wound healing, scarring, flaky skin, Raynaud's phenomenon, sensitivity to sunlight and skin thickening.   Neurological:  Negative for dizziness, tingling, tremors, speech change,  seizures, loss of consciousness, weakness, light-headedness, numbness, headaches, disturbances in coordination, extremity numbness, memory loss, difficulty walking and paralysis.   Endo/Heme:  Negative for anemia, swollen glands and bruises/bleeds easily.   Psychiatric/Behavioral:  Negative for depression, hallucinations, memory loss, decreased concentration, mood swings and panic attacks.    Breast:  Negative for breast discharge, breast mass, breast pain and nipple retraction.   Endocrine:  Negative for altered temperature regulation, polyphagia, polydipsia, unwanted hair growth and change in facial hair.      MEDICATIONS:   Current Outpatient Medications   Medication Sig Dispense Refill     amLODIPine (NORVASC) 10 MG tablet        atorvastatin (LIPITOR) 40 MG tablet        calcitRIOL (ROCALTROL) 0.25 MCG capsule Take 0.25 mcg by mouth daily.       traZODone (DESYREL) 50 MG tablet Take 1 tablet by mouth At Bedtime.       lisinopril (PRINIVIL,ZESTRIL) 40 MG tablet Take 1 tablet (40 mg) by mouth daily 30 tablet      metoprolol (LOPRESSOR) 25 MG tablet Take 25 mg by mouth 2 times daily.       simvastatin (ZOCOR) 20 MG tablet Take 1 tablet by mouth daily.       sodium bicarbonate 650 MG tablet Take 325 mg by mouth 3 times daily.       topiramate (TOPAMAX) 25 MG tablet 25 mg at bedtime for 1 week, 50 mg at bedtime thereafter (Patient not taking: Reported on 1/15/2019) 180 tablet 2         ASSESSMENT/PLAN:  Alla Shrestha is a 47 year old female who I am continuing to see for treatment of obesity related to: ESRD on HD secondary to membranous nephropathy, hypertension, hyperlipidemia    She required to weigh 224 lbs in order to get kidney transplant.    Now on peritoneal dialysis  Stopped topiramate due to jitteriness at night and insomnia.   Motivated to lose weight and continue with diet plan    PLAN:  - start naltrexone 25-50 mg daily at midday  - encourage her to have more protein  - continue diet plan  - encourage  exercise as tolerated    FOLLOW-UP:    RTC 3 months    Time: 15 min spent on evaluation, management, counseling, education, & motivational interviewing with greater than 50 % of the total time was spent on counseling and coordinating care    Sincerely,    Sunshine Turner MD   No

## 2021-06-03 ENCOUNTER — RECORDS - HEALTHEAST (OUTPATIENT)
Dept: ADMINISTRATIVE | Facility: CLINIC | Age: 51
End: 2021-06-03

## 2021-06-07 NOTE — PROGRESS NOTES
Alla Shrestha is a 49 y.o. female who is being evaluated via a billable telephone visit.      Alla Shrestha complains of    Chief Complaint   Patient presents with     Vaginitis       Assessment/Plan:    1. Vaginal discharge  Symptoms are similar to what she has experienced in the past with BV.  She did have history of trichomonas in the past but currently reports that has not been sexually active.  Will treat with Flagyl and will check on dosing in patients with peritoneal dialysis.  If this does not help we can consider treating for yeast infection given the fact that she has had antibiotics in the last 6 weeks.  -Flagyl 500 mg PO two times a day x 7 days.    2. ESRD (end stage renal disease) (H)  She is followed by nephrology closely.  She is on home peritoneal dialysis which she administers to himself nightly.  She has been able to work from home during this coronavirus outbreak    3. Mild intermittent asthma in adult without complication  She has had mild upper respiratory infection 6 weeks ago and was treated with antibiotic.  Currently she is asymptomatic.  She is requesting refill on her albuterol.  She is trying to quit smoking    4. Encounter for screening mammogram for breast cancer  Discussed to have that done in the summer  - Mammo Screening Bilateral; Future    5. Insomnia, unspecified type  She has been experiencing slightly more anxiety.  In the past has tolerated trazodone well.  She usually takes half to 1 tablet a day as needed.  - traZODone (DESYREL) 50 MG tablet; Take 1 tablet (50 mg total) by mouth at bedtime as needed for sleep.  Dispense: 90 tablet; Refill: 1      Additional provider notes:     Alla  has history of end stage kidney disease/on PD,  hypertension , anemia, history of fibroids and partial hysterectomy and , obesity, gout,bl knee OA and smoking who is currently here for telephone visit due to vaginal discharge.    Patient reports that she developed mild vaginal  "itchiness and malodorous discharge several days ago.  She has tried Monistat 7 over-the-counter but it did not help.  In the past when she had similar complaints vaginal swab was positive for BV.  She also has had history of trichomonas in 2018.  Currently she reports that she has not been sexually active.  Her symptoms are similar to what she experienced before with BV.  She does have partial hysterectomy and does not menstruate.  Her ovaries are still intact.    I have not seen her in the office for the last year and a half.  She does have end-stage kidney disease due to membranous nephropathy and started peritoneal dialysis at home for about a year and a half.  She has not had any problems with that and is doing very well.  She continues to work and was able to start working from home 3 weeks ago when corona virus outbreak started.    She does have mild intermittent asthma.  She did have mild upper respiratory infection 6 weeks ago and her nephrologist did give her antibiotics.  Currently her symptoms have resolved and she has not required albuterol but does request a refill for that.  She does have history of smoking.  She does not smoke in the house and since she has been working from home she is trying to quit smoking completely.    I have reviewed and updated the patient's Past Medical History, Social History, Family History, Allergies and Medication List.      The patient has been notified of following:     \"This telephone visit will be conducted via a call between you and your physician/provider. We have found that certain health care needs can be provided without the need for a physical exam.  This service lets us provide the care you need with a short phone conversation.  If a prescription is necessary we can send it directly to your pharmacy.  If lab work is needed we can place an order for that and you can then stop by our lab to have the test done at a later time.    If during the course of the call " "the physician/provider feels a telephone visit is not appropriate, you will not be charged for this service.\"       Phone call duration:  12 minutes    "

## 2021-06-07 NOTE — TELEPHONE ENCOUNTER
Medication Question or Clarification  Who is calling: patient   What medication are you calling about (include dose and sig)?: albuterol (PROAIR HFA;PROVENTIL HFA;VENTOLIN HFA) 90 mcg/actuation inhaler   Who prescribed the medication?: Betty Vazquez MD    What is your question/concern?: medication was printed off instead of e scribe please change class and re send  Requested Pharmacy: [facility]  Okay to leave a detailed message?: Yes

## 2021-06-12 NOTE — PROGRESS NOTES
[facility] Follow Up Note    Assessment/Plan:    1. Annual physical exam  Tetanus shot booster was given to her today.  In the future she should get pneumococcal vaccinations.  She had history of hysterectomy and oophorectomy.  Mammogram was ordered.  - Tdap vaccine,  8yo or older,  IM  - Mammo Screening Bilateral; Future    2. CKD stage 4 secondary to hypertension  We will obtain records from her nephrologist.  She has CKD associated anemia and acidosis.  She is on Procrit shots and sodium bicarbonate.  She is on a fairly high dose of lisinopril and currently eats high potassium diet.  We will check her potassium levels and discussed low potassium diet.  She is on a cholesterol medication, will check her LDL  - Comprehensive Metabolic Panel  - LDL Cholesterol, Direct    3. Left knee pain  - Ambulatory referral to Orthopedics    4. Gout  She is currently off uric acid and has not had any recent recurrence  - Uric Acid    5. Anemia  Secondary to CKD, we will also check for iron deficiency.  - HM1(CBC and Differential)  - Iron and Transferrin Iron Binding Capacity  - Thyroid Stimulating Hormone (TSH)  - HM1 (CBC with Diff)    6. Calf pain  - US Venous Insufficency Leg Left; Future    7. Mild persistent asthma without complication  Patient has not been diagnosed with asthma but currently she is chronic smoker and does have some mild wheezing.  Discussed that she should start antihistamine and I will put her on Advair.  Currently she does not appear to be in congestive heart failure.  Smoking cessation was strongly encouraged.  - fluticasone-salmeterol (ADVAIR) 250-50 mcg/dose DISKUS; Inhale 1 puff 2 (two) times a day.  Dispense: 60 each; Refill: 11      Betty Vazquez MD    Chief Complaint:  Chief Complaint   Patient presents with     Annual Exam     Breast exam       History of Present Illness:  Alla is a 46 y.o. female Ms. history of stage IV kidney disease secondary to uncontrolled hypertension  and associated anemia and acidemia, history of fibroids and hysterectomy and oophorectomy, obesity, gout, left knee pain and smoking who is currently here to establish care.    Patient sounds congested on exam when she talks.  She denies any history of asthma but has used albuterol in the past.  She tells me that she woke up congested this morning and denies any shortness of breath, fevers or chills.  She does intermittently take Benadryl.  On exam she has very mild and expiratory wheezing.  She does smoke 8 cigarettes a day for the last 20 years.  Discussed smoking cessation and Advair.  She will also start on nonsedating antihistamine during the day.    Patient has stage IV kidney disease due to previously uncontrolled high blood pressure.  Currently she is followed by nephrologist from [facility].  They were planning to put her on transplant list but she has to lose weight first and currently is followed by weight management clinic at [facility].  She does have chronic anemia and has been on erythropoietin shots.  In the past she also received IV iron infusions but it became too expensive and currently she is supposed to take iron supplements.  She is on sodium bicarbonate.  Her blood pressure today is above goal but she has not filled her medications for the last 3 days and will be going to the pharmacy to pick them up.  She is on metoprolol, amlodipine and lisinopril.  For some reason she is on a very high dose of lisinopril 40 mg twice a day.  She continues to eat high potassium diet and does not appear that low potassium diet was discussed with her.  She does occasionally retains fluid and uses Bumex 1 mg 3 tablets 3 times a day as needed.  She is on Lipitor 40 mg a day.  She is also on aspirin 325 mg which sometimes she takes more than once a day for her knee arthritis.    In the past she had history of gout and was on allopurinol but currently has not been taking it and denies any  "recent gout outbreaks.  She does have chronic pain in her left knee which has gotten worse.  In the past she had knee steroid injections.  Currently it is interfering with her ability to exercise.  She is determined to lose some weight and has been doing a lot of water aerobics.  She also complains about pain in her left calf which is new.    Review of Systems:  A comprehensive review of systems was performed and was otherwise negative denies any abdominal pain or blood in the stool.    PFSH:  Social History: Reviewed  History   Smoking Status     Never Smoker   Smokeless Tobacco     Never Used     Social History     Social History Narrative    Patient lives with her boyfriend.  She has a grown son and a dog.  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       Past History: Reviewed  Current Outpatient Prescriptions   Medication Sig Dispense Refill     allopurinol (ZYLOPRIM) 100 MG tablet Take 100 mg by mouth daily.       aspirin 81 MG EC tablet Take 81 mg by mouth daily.       lisinopril (PRINIVIL,ZESTRIL) 40 MG tablet Take 40 mg by mouth 2 (two) times a day.       MULTIVITAMIN ORAL Take 1 tablet by mouth daily.       SIMVASTATIN ORAL Take 1 tablet by mouth bedtime.       SODIUM BICARBONATE/SODIUM CIT (SODIUM BICARB & CITRATE ORAL) Take 0.5 tablets by mouth bedtime.       fluticasone-salmeterol (ADVAIR) 250-50 mcg/dose DISKUS Inhale 1 puff 2 (two) times a day. 60 each 11     No current facility-administered medications for this visit.        Family History: Reviewed, very strong family history of hypertension    Physical Exam:    Vitals:    07/31/17 1609   BP: 148/90   Pulse: 82   Resp: 12   SpO2: 96%   Weight: (!) 277 lb (125.6 kg)   Height: 5' 7\" (1.702 m)     Wt Readings from Last 3 Encounters:   07/31/17 (!) 277 lb (125.6 kg)   10/11/14 (!) 240 lb (108.9 kg)     Body mass index is 43.38 kg/(m^2).    Constitutional:  Reveals a pleasant female.  Vitals:  Per nursing notes.  HEENT:No cervical LAD, no " thyromegaly,  conjunctiva is pink, no scleral icterus, TMs are visualized and normal bl, oropharynx is clear, no exudates,   Cardiac:  Regular rate and rhythm,no murmurs, rubs, or gallops. Legs without edema. Palpation of the radial pulse regular.  Lungs: Clear to auscultation bl.  Respiratory effort normal.  Abdomen:positive BS, soft, nontender, nondistended.  No hepato-splenomagaly  Skin:   Without rash, bruise, or palpable lesions.  Rheumatologic: Normal joints and nails of the hands.  Neurologic:  Cranial nerves II-XII intact.     Psychiatric: affect appropriate, memory intact.   Breast exam: Burrell lymphadenopathy breast masses or skin changes appreciated.    Data Review:    Analysis and Summary of Old Records (2): Yes    Records Requested (1): Yes from nephrologist    Other History Summarized (from other people in the room) (2): No    Radiology Tests Summarized (XRAY/CT/MRI/DXA) (1): No    Labs Reviewed (1): No    Medicine Tests Reviewed (EKG/ECHO/COLONOSCOPY/EGD) (1): No    Independent Review of EKG or X-RAY (2): No

## 2021-06-13 NOTE — PROGRESS NOTES
[facility] Follow Up Note    Assessment/Plan:    ADDENDUM 11.14.17:    Pt has been working with her hematologist and has gotten IV iron infusions and Aranesp. Her most recent Hb is up to 8.9. Her last Aranesp dose was on 11.10.17. Hemoglobin level is now appropriate for surgery. Pt will f/u with hematologist post op.    Pt also reached out to her nephrologist regarding surgery. Due to severe renal insufficiency, nephrologist recommended using regional anesthesia instead of general anesthesia to mitigate possibility of renal strain. Pt is aware of risk of surgery pushing her to dialysis if any complications occur. Pt should avoid all NDAIDs, nephrotoxins, dehydration and hypotension alison and post operatively. The day of surgery she should take her b/p meds with small sip of water. Discussed with pt to take only 1/2 pill of lisinopril day of surgery.    1. Pre-op exam  Discussed with patient.  Her preop hemoglobin was 7.6.  She is not fit for the surgery was such a low hemoglobin.  I asked her to continue close up with hematologist and nephrologist to help bring up her hemoglobin to around 9 or 10 before we would consider elective surgery.  She will need to postpone surgery for now  - Electrocardiogram Perform and Read  - XR Chest PA and Lateral  - INR  - Urinalysis-UC if Indicated  - Culture, Urine    2. Chronic renal impairment, stage 4 (severe)  Patient is followed by nephrologist.  Will check a BMP today.  She is taking baking soda with water instead of bicarbonate tablets because they are making her nauseated.  Her blood pressure is controlled on lisinopril and amlodipine.  She takes Bumex only intermittently.  - HM1(CBC and Differential)  - Basic Metabolic Panel  - HM1 (CBC with Diff)    3. HTN (hypertension)  Controlled    4. Mild intermittent asthma without complication  I asked patient to start using Advair twice a day on a regular basis and quit smoking.  - XR Chest PA and Lateral    Betty  MD Taylor    Chief Complaint:  Chief Complaint   Patient presents with     Pre-op Exam     Pre Op 11/15/17 at [facility] with  for replacement of both knees if possible        History of Present Illness:  Alla is a 47 y.o. 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    Patient has significant pain in her knees.  She was seen by orthopedist and x-rays showed end-stage arthritis.  No replacement surgery was recommended and that is why patient is here.  Her previous surgeries include hysterectomy and kidney biopsy.  She denies any bleeding clotting or anesthesia problems.    Patient has history of asthma and smoking.  She smokes 4 cigarettes a day.  Currently she is not using albuterol or Advair.  She denies any recent upper respiratory infections, fevers chills cough or shortness of breath.  On exam she is slightly congested.  After she coughs for lung exam is clear.  We discussed that she needs to restart her Advair twice a day and we will obtain chest x-ray.    Patient has stage IV kidney disease due to membranous nephropathy.  She is followed by renal doctor.  She is currently on lisinopril and Norvasc for blood pressure which is well controlled.  She also restarted taking bicarbonate supplement.  She recently saw her kidney doctor in August 23.  At that time her lisinopril was decreased from 40 mg twice a day to 40 mg daily and instead of sodium bicarbonate tablets she rec commended to take baking soda once a day with a glass of water.  Patient does have history of anemia.  Per renal nodes she had limited response to Procrit in the past.  There was a question about possible thalassemia or thalassemia trait.  Currently she is followed by hematologist and was seen there  recently.  She reports that they did a bunch of blood work 2 weeks ago.  She will be seeing them again next week.  We will repeat her hemoglobin levels today.  She does have occasional fluid retention and uses Blue Max once a week.    Patient denies any history of heart problems.  She is not very physically active but denies any chest pains palpitations or dizziness.  EKG in the office today was normal.    Review of Systems:  A comprehensive review of systems was performed and was otherwise negative    PFSH:  Social History: Yes  History   Smoking Status     Never Smoker   Smokeless Tobacco     Never Used     Social History     Social History Narrative    Patient lives with her boyfriend.  She has a grown son and a dog.  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       Past History: Yes  Current Outpatient Prescriptions   Medication Sig Dispense Refill     albuterol (PROAIR HFA;PROVENTIL HFA;VENTOLIN HFA) 90 mcg/actuation inhaler Inhale 2 puffs every 6 (six) hours as needed for wheezing. 1 each 0     amLODIPine (NORVASC) 10 MG tablet Take 1 tablet (10 mg total) by mouth daily. 30 tablet 11     aspirin 325 MG EC tablet Take 1 tablet (325 mg total) by mouth daily.  0     atorvastatin (LIPITOR) 40 MG tablet Take 1 tablet (40 mg total) by mouth daily. 30 tablet 11     bumetanide (BUMEX) 1 MG tablet Take 1 tablet (1 mg total) by mouth daily. As needed 30 tablet 0     calcitriol (ROCALTROL) 0.25 MCG capsule Take 1 capsule (0.25 mcg total) by mouth daily. 30 capsule 0     fluticasone-salmeterol (ADVAIR) 250-50 mcg/dose DISKUS Inhale 1 puff 2 (two) times a day. 60 each 11     lisinopril (PRINIVIL,ZESTRIL) 40 MG tablet Take 40 mg by mouth 2 (two) times a day.       metoprolol tartrate (LOPRESSOR) 25 MG tablet Take 1 tablet (25 mg total) by mouth 2 (two) times a day.  0     MULTIVITAMIN ORAL Take 1 tablet by mouth daily.       sodium bicarbonate 650 MG tablet Take 1 tablet (650 mg total) by mouth 2 (two) times a day.  100 tablet 1     SODIUM BICARBONATE/SODIUM CIT (SODIUM BICARB & CITRATE ORAL) Take 0.5 tablets by mouth bedtime.       traZODone (DESYREL) 50 MG tablet Take 0.5 tablets (25 mg total) by mouth at bedtime. 30 tablet 11     No current facility-administered medications for this visit.        Family History: Yes    Physical Exam:    Vitals:    10/23/17 1619   BP: 132/82   Patient Site: Left Arm   Patient Position: Sitting   Cuff Size: Adult Large   Pulse: 70   Weight: (!) 261 lb 9.6 oz (118.7 kg)     Wt Readings from Last 3 Encounters:   10/23/17 (!) 261 lb 9.6 oz (118.7 kg)   07/31/17 (!) 277 lb (125.6 kg)   10/11/14 (!) 240 lb (108.9 kg)     Body mass index is 40.97 kg/(m^2).    Constitutional:  Reveals a pleasant female.  Vitals:  Per nursing notes.  HEENT:No cervical LAD, no thyromegaly,  conjunctiva is pink, no scleral icterus, TMs are visualized and normal bl, oropharynx is clear, no exudates,   Cardiac:  Regular rate and rhythm,no murmurs, rubs, or gallops.  Legs with 1+ edema which is chronic. Palpation of the radial pulse regular.  Lungs: Clear to auscultation bl.  Respiratory effort normal.  She does have some mucus at the bases but after she clears it up her breathing is clear.  When she coughs there is slight bronchospasm.  Abdomen:positive BS, soft, nontender, nondistended.  No hepato-splenomagaly  Skin:   Without rash, bruise, or palpable lesions.  Rheumatologic: Antalgic gait  Neurologic:  Cranial nerves II-XII intact.     Psychiatric: affect appropriate, memory intact.     Data Review:    Analysis and Summary of Old Records (2): Yes    Records Requested (1): Yes from oncologist    Other History Summarized (from other people in the room) (2): No    Radiology Tests Summarized (XRAY/CT/MRI/DXA) (1): No    Labs Reviewed (1): Yes    Medicine Tests Reviewed (EKG/ECHO/COLONOSCOPY/EGD) (1): No    Independent Review of EKG or X-RAY (2): Yes EKG

## 2021-06-14 NOTE — ANESTHESIA PREPROCEDURE EVALUATION
Anesthesia Evaluation      Patient summary reviewed   No history of anesthetic complications     Airway   Mallampati: II  Neck ROM: full   Pulmonary - normal exam    breath sounds clear to auscultation  (+) asthma  a smoker                         Cardiovascular - normal exam  Exercise tolerance: > or = 4 METS  (+) hypertension, , hypercholesterolemia,     Rhythm: regular  Rate: normal,         Neuro/Psych - negative ROS     Endo/Other    (+) arthritis, obesity,      GI/Hepatic/Renal    (+)   chronic renal disease CRI,     Comments: Anemia ofchronic DZ     Other findings: Lab Results      Component                Value               Date                       INR                      1.02                10/23/2017                 INR                      1.07                07/11/2011            Results for orders placed or performed in visit on 10/23/17  -Basic Metabolic Panel       Result                                            Value                         Ref Range                       Sodium                                            140                           136 - 145 mmol/L                Potassium                                         4.4                           3.5 - 5.0 mmol/L                Chloride                                          109 (H)                       98 - 107 mmol/L                 CO2                                               21 (L)                        22 - 31 mmol/L                  Anion Gap, Calculation                            10                            5 - 18 mmol/L                   Glucose                                           96                            70 - 125 mg/dL                  Calcium                                           9.0                           8.5 - 10.5 mg/dL                BUN                                               58 (H)                        8 - 22 mg/dL                    Creatinine                                         5.05 (H)                      0.60 - 1.10 mg/dL               GFR MDRD Af Amer                                  11 (L)                        >60 mL/min/1.73m2               GFR MDRD Non Af Amer                              9 (L)                         >60 mL/min/1.73m2            Lab Results       Component                Value               Date                       WBC                      9.8                 11/15/2017                 HGB                      8.8 (L)             11/15/2017                 HCT                      29.1 (L)            11/15/2017                 MCV                      110 (H)             11/15/2017                 PLT                      199                 11/15/2017                  Dental    (+) poor dentition                         Anesthesia Plan  Planned anesthetic: spinal and peripheral nerve block  Changed from bilat to left  POP blocks (tib/saph)    Will get type and screen    ASA 4     Anesthetic plan and risks discussed with: patient  Anesthesia plan special considerations: antiemetics,   Post-op plan: routine recovery

## 2021-06-14 NOTE — PROGRESS NOTES
[facility] Daily Progress     Patient Name: Alla Shrestha  Date: 2017  Visit #: 5  PTA visit #:  2  Referral Diagnosis: Primary osteoarthritis of left knee  Referring provider: Martha Boss PAC, Marina Rhoades PAC  Visit Diagnosis:     ICD-10-CM    1. Left knee pain, unspecified chronicity M25.562    2. Knee stiffness, left M25.662    3. Primary osteoarthritis of left knee M17.12    4. History of total left knee replacement Z96.652    5. Essential hypertension I10    6. Mixed hyperlipidemia E78.2    7. Mild intermittent asthma in adult without complication J45.20          Assessment:     HEP/POC compliance is  good .  Patient demonstrates understanding/independence with home program.  Patient is benefitting from skilled physical therapy and is making steady progress toward functional goals.  Patient is appropriate to continue with skilled physical therapy intervention, as indicated by initial plan of care.    Goal Status:  Pt. will demonstrate/verbalize independence in self-management of condition in : 6 weeks  Pt. will be independent with home exercise program in : 6 weeks  Pt. will be able to walk : 20 minutes;with less pain;with less difficulty;for community mobility;for household mobility;in 12 weeks;Comment  Comment:: without AD  Patient will stand : 30 minutes;with less pain;with less difficultty;for home chores;in 12 weeks  Patient will transfer: sit/stand;for car;for toileting;for in/out of bed;for in/out of chair;in 12 weeks  Pt will: be able to drive in 4-6 weeks.     Plan / Patient Education:     Continue with initial plan of care.  Progress with home program as tolerated.   Scheduled for surgery on other knee on 17.     Subjective:     Pain Ratin/10 left knee pain   Has been feeling pretty good. Swelling is better. Still having trouble with leg lifts.       Objective:     Quad recruitment is fair. Mod ext lag with SLR flex.   Added restorator, prone knee bends, prone SLR,knee  flex stretch with chair.  Issued tubigrip G for left leg swelling       Treatment Today   10 min late  TREATMENT MINUTES COMMENTS   Evaluation     Self-care/ Home management     Manual therapy     Neuromuscular Re-education     Therapeutic Activity     Therapeutic Exercises 23 Exercises per flow sheet.   Continue to emphasize quad control    Gait training     Modality__________________                Total 23    Blank areas are intentional and mean the treatment did not include these items.       Rekha Forde  12/6/2017

## 2021-06-14 NOTE — ANESTHESIA POSTPROCEDURE EVALUATION
Patient: Alla Shrestha  LEFT TOTAL KNEE ARTHROPLASTY  Anesthesia type: spinal    Patient location: PACU  Last vitals:   Vitals:    11/15/17 1500   BP: 122/58   Pulse: 72   Resp: 20   Temp:    SpO2: 100%     Post vital signs: stable  Level of consciousness: awake and responds to simple questions  Post-anesthesia pain: pain controlled  Post-anesthesia nausea and vomiting: no  Pulmonary: unassisted, return to baseline  Cardiovascular: stable and blood pressure at baseline  Hydration: adequate  Anesthetic events: no    QCDR Measures:  ASA# 11 - Eli-op Cardiac Arrest: ASA11B - Patient did NOT experience unanticipated cardiac arrest  ASA# 12 - Eli-op Mortality Rate: ASA12B - Patient did NOT die  ASA# 13 - PACU Re-Intubation Rate: NA - No ETT / LMA used for case  ASA# 10 - Composite Anes Safety: ASA10A - No serious adverse event    Additional Notes:

## 2021-06-14 NOTE — PROGRESS NOTES
[facility] Daily Progress     Patient Name: Alla Shrestha  Date: 12/15/2017  Visit #: 7  PTA visit #:  3  Referral Diagnosis: Primary osteoarthritis of left knee  (L) TKA 11/15/17  Referring provider: Martha BROWN, Marina Rhoades PAC  Visit Diagnosis:     ICD-10-CM    1. Left knee pain, unspecified chronicity M25.562    2. Knee stiffness, left M25.662    3. Primary osteoarthritis of left knee M17.12    4. History of total left knee replacement Z96.652    5. Essential hypertension I10    6. Mixed hyperlipidemia E78.2    7. Mild intermittent asthma in adult without complication J45.20    8. CKD (chronic kidney disease) stage 4, GFR 15-29 ml/min N18.4          Assessment:     HEP/POC compliance is  good .  Patient demonstrates understanding/independence with home program.  Response to Intervention good  Patient is benefitting from skilled physical therapy and is making steady progress toward functional goals.  Patient is appropriate to continue with skilled physical therapy intervention, as indicated by initial plan of care.    Goal Status:  Pt. will demonstrate/verbalize independence in self-management of condition in : 6 weeks;Partially Met  Pt. will be independent with home exercise program in : 6 weeks;Met  Pt. will be able to walk : 20 minutes;with less pain;with less difficulty;for community mobility;for household mobility;in 12 weeks;Comment;Progressing toward  Comment:: without AD   status: able to ambulate at home without walker  Patient will stand : 30 minutes;with less pain;with less difficultty;for home chores;in 12 weeks;Partially met  Patient will transfer: sit/stand;for car;for toileting;for in/out of bed;for in/out of chair;in 12 weeks;Partially met  Pt will: be able to drive in 4-6 weeks.    status: is driving now. Met    Plan / Patient Education:     Continue with initial plan of care.  Progress with home program as tolerated.    Subjective:     Pain Ratin  A little tight this  morning. Has been off the pain med for a few days, so has been more painful. Still scheduled for (R) TKA next week. Has been doing well with the exercises.       Objective:     Tenderness (L) post med femoral condyle, med hamstrings, post knee.   Edema is improving. Mod suprapatellar effusion noted.  Quad recruitment is improved with min/no ext lag with SLR flex.   ROM: 0-2-95 deg (heel slide).    Treatment Today     TREATMENT MINUTES COMMENTS   Evaluation     Self-care/ Home management     Manual therapy 10 Induction, indirect, direct techniques utilized as appropriate for optimal tissue release.   MFR/SCS -  (L) MSG-LV, (L) DFEM-LV, (L) SAPH-LV,     Neuromuscular Re-education     Therapeutic Activity     Therapeutic Exercises 20 Exercises per flow sheet.    Gait training     Modality__________________                Total 30    Blank areas are intentional and mean the treatment did not include these items.       Nanette Adkins  12/15/2017

## 2021-06-14 NOTE — PROGRESS NOTES
"[facility] Daily Progress     Patient Name: Alla Shrestha  Date: 2017  Visit #: 3  PTA visit #:  1  Referral Diagnosis: Primary osteoarthritis of left knee  Referring provider: Martha BROWN, Marina Rhoades PAC  Visit Diagnosis:     ICD-10-CM    1. Left knee pain, unspecified chronicity M25.562    2. Knee stiffness, left M25.662    3. Primary osteoarthritis of left knee M17.12    4. History of total left knee replacement Z96.652          Assessment:     Patient is benefitting from skilled physical therapy and is making steady progress toward functional goals.  Patient is appropriate to continue with skilled physical therapy intervention, as indicated by initial plan of care.    Goal Status:  Pt. will demonstrate/verbalize independence in self-management of condition in : 6 weeks  Pt. will be independent with home exercise program in : 6 weeks  Pt. will be able to walk : 20 minutes;with less pain;with less difficulty;for community mobility;for household mobility;in 12 weeks;Comment  Comment:: without AD  Patient will stand : 30 minutes;with less pain;with less difficultty;for home chores;in 12 weeks  Patient will transfer: sit/stand;for car;for toileting;for in/out of bed;for in/out of chair;in 12 weeks  Pt will: be able to drive in 4-6 weeks.     Plan / Patient Education:     Continue with initial plan of care.  Progress with home program as tolerated.   Measure for swelling on left knee    Subjective:     Pain Ratin/10 left knee pain, \"ran out of pain medications seeing MD today\"  Using ice 3x/day  Ex 2x/day walking stairs daily        Objective:   Discussed massage/ elevation/ icing for swelling  Quad set is fair. Moderate ext lag with SLR. Requires assist for SLR flex  Cue for HEP  Left knee joint line  68 cm today  Left knee flex 90 degress     Treatment Today     TREATMENT MINUTES COMMENTS   Evaluation     Self-care/ Home management     Manual therapy 10 Discussed elevation with " massage for swelling   Neuromuscular Re-education     Therapeutic Activity     Therapeutic Exercises 15 Exercises per flow sheet.    Gait training     Modality__________________                Total 25    Blank areas are intentional and mean the treatment did not include these items.       Rekha Forde  11/29/2017

## 2021-06-14 NOTE — ANESTHESIA PREPROCEDURE EVALUATION
Anesthesia Evaluation      Patient summary reviewed   No history of anesthetic complications     Airway   Mallampati: II  Neck ROM: full   Pulmonary - normal exam    breath sounds clear to auscultation  (+) a smoker                         Cardiovascular - normal exam  (+) hypertension poorly controlled, ,     Rhythm: regular  Rate: normal,         Neuro/Psych - negative ROS     Endo/Other    (+) obesity,      GI/Hepatic/Renal    (+)   chronic renal disease ESRD and CRI,     Comments: Anemia ofchronic DZ     Other findings: Lab Results      Component                Value               Date                       INR                      1.02                10/23/2017                 INR                      1.07                07/11/2011            Results for orders placed or performed in visit on 10/23/17  -Basic Metabolic Panel       Result                                            Value                         Ref Range                       Sodium                                            140                           136 - 145 mmol/L                Potassium                                         4.4                           3.5 - 5.0 mmol/L                Chloride                                          109 (H)                       98 - 107 mmol/L                 CO2                                               21 (L)                        22 - 31 mmol/L                  Anion Gap, Calculation                            10                            5 - 18 mmol/L                   Glucose                                           96                            70 - 125 mg/dL                  Calcium                                           9.0                           8.5 - 10.5 mg/dL                BUN                                               58 (H)                        8 - 22 mg/dL                    Creatinine                                        5.05 (H)                      0.60 - 1.10  mg/dL               GFR MDRD Af Amer                                  11 (L)                        >60 mL/min/1.73m2               GFR MDRD Non Af Amer                              9 (L)                         >60 mL/min/1.73m2            Lab Results       Component                Value               Date                       WBC                      9.8                 11/15/2017                 HGB                      8.8 (L)             11/15/2017                 HCT                      29.1 (L)            11/15/2017                 MCV                      110 (H)             11/15/2017                 PLT                      199                 11/15/2017                  Dental    (+) poor dentition                       Anesthesia Plan  Planned anesthetic: spinal and peripheral nerve block  Changed from bilat to left  POP blocks (tib/saph)    ASA 4     Anesthetic plan and risks discussed with: patient  Anesthesia plan special considerations: antiemetics,   Post-op plan: routine recovery

## 2021-06-14 NOTE — PROGRESS NOTES
[facility]   Knee Initial Evaluation    Patient Name: Alla Shrestha  Date of evaluation: 11/22/2017   Visit #1  Referral Diagnosis: Primary osteoarthritis of left knee  Referring provider: Martha Boss PAC, Marina Rhoades PAC  Visit Diagnosis:     ICD-10-CM    1. Left knee pain M25.562    2. Knee stiffness, left M25.662    3. Knee osteoarthritis M17.10    4. History of total left knee replacement Z96.652    5. CKD (chronic kidney disease) stage 4, GFR 15-29 ml/min N18.4    6. Essential hypertension I10    7. Mixed hyperlipidemia E78.2    8. Mild intermittent asthma in adult without complication J45.20        Assessment:       Alla Shrestha is a 47 y.o. female who presents to therapy today with chief complaints of (L) knee pain/stiffness, s/p TKA 11/15/17.  Functional impairments include working, driving, standing, walking, personal cares, transfers.  Clinical findings include gait deficits, knee effusion, decreased ROM, decreased quad recruitment/control.     Pt. is appropriate for skilled PT intervention as outlined in the Plan of Care (POC).  Pt. is a good candidate for skilled PT services to improve pain levels and function.    Goals:  Pt. will demonstrate/verbalize independence in self-management of condition in : 6 weeks  Pt. will be independent with home exercise program in : 6 weeks  Pt. will be able to walk : 20 minutes;with less pain;with less difficulty;for community mobility;for household mobility;in 12 weeks;Comment  Comment:: without AD  Patient will stand : 30 minutes;with less pain;with less difficultty;for home chores;in 12 weeks  Patient will transfer: sit/stand;for car;for toileting;for in/out of bed;for in/out of chair;in 12 weeks  Pt will: be able to drive in 4-6 weeks.     Patient's expectations/goals are realistic.    Barriers to Learning or Achieving Goals:  No Barriers.       Plan / Patient Instructions:      Plan of Care:   Authorization / Certification Number of Visits:  [facility]  Communication with: Referral Source  Patient Related Instruction: Nature of Condition;Treatment plan and rationale;Self Care instruction;Basis of treatment;Next steps;Precautions  Times per Week: 1-2  Number of Visits: up to 12  Discharge Planning: home program, self managemnt  Precautions / Restrictions : kidney disease  Therapeutic Exercise: ROM;Stretching;Strengthening  Neuromuscular Reeducation: kinesio tape;balance/proprioception  Manual Therapy: myofascial release;strain counterstrain  Gait Training: with/without AD    Plan for next visit: progression of home program, gait training, manual therapy     Subjective:        Social information:   Living Situation:apartment and lives with others    Occupation:customer care   Work Status:Working full time   Equipment Available: walker wheeled    History of Present Illness:    Alla is a 47 y.o. female who presents to therapy today with complaints of (L) knee pain, s/p (L) TKA on 11/15/17. Onset was related to surgery. She reports chronic knee problems for about 10 years. Symptoms are intermittent and getting better. She reports  A chronic  history of similar symptoms. She describes their previous level of function as not limited  She states things are going okay. Has a lot of stiffness. She is on medical leave from work. Return date undetermined.  She has problems with (R) knee too. They were unable to perform (B) TKA due to low hemoglobin. She is scheduled for (R) TKA on 17. She also has chronic kidney disease.    Pain Ratin  Pain rating at best: 3   Pain rating at worst: 10  Pain description:pain    Functional limitations are described as occurring with:   ascending and descending stairs or curbs  balance  sleeping  standing 5 min  transitional movements getting in  chair and car, getting out of  chair and car and sit to stand  walking 2 min  work not working  waking 2-3x/night   Has a toilet riser, unable to use shower right now    Patient reports  "benefit from:  rest  , cold       Objective:      Note: Items left blank indicates the item was not performed or not indicated at the time of the evaluation.    Patient Outcome Measures :    Lower Extremity Functional Scale (_/80): 10   Scores range from 0-80, where a score of 80 represents maximum function. The minimal clinically important difference is a positive change of 9 points.    Knee Examination  1. Left knee pain     2. Knee stiffness, left     3. Knee osteoarthritis     4. History of total left knee replacement     5. CKD (chronic kidney disease) stage 4, GFR 15-29 ml/min     6. Essential hypertension     7. Mixed hyperlipidemia     8. Mild intermittent asthma in adult without complication       Precautions/Restrictions:  chronic kidney disease  Involved Side: Left  Posture Observation:   Assistive Device: Walker  Gait Observation: antalgic (L), using wheeled walker  Observation: generalized effusion in knee/lower leg, pitting noted.  Steri strips intact. No drainage.  Circumferential measurements:  4\" above (L) 66.5 cm/(R) 59.6 cm  jt line  (L) 54.5 cm/(R) 46.5 cm  3\" below (L) 52.2 cm/(R) 46.2 cm    Knee ROM Within normal limits unless otherwise indicated     Date:  11/22/17    AROM in degrees  Right   Left  Right   Left  Right   Left       Knee Flexion  (130 )      72 (+)                   Knee Extension  (0 )      13 (+)                 PROM in degrees  Right   Left  Right   Left  Right   Left       Knee Flexion  (130 )      75 (+)                   Knee Extension  (0 )                       LE Strength    Within normal limits unless otherwise indicated               Date:     Strength (MMT/5)  Right   Left  Right   Left  Right   Left       Hip Flexion                         Hip Abduction                         Hip Adduction                         Hip Extension                         Hip Internal Rotation                         Hip External Rotation                         Knee Extension      " Fair recruitment with quad set, mod lag with SLR                   Knee Flexion                         Ankle Dorsiflexion                         Ankle Plantarflexion                       Flexibility:      Palpation:  Mild tenderness (L) sup/lat aspect medial femoral condyle    Knee Special Tests (+/-):      Knee OA Cluster   Right   Left   Ligament Tests   Right   Left    1. > 49 y/o           Lachman          2. Stiffness > 30 min.           Anterior Drawer          3. Crepitus           Posterior Drawer          4. Bony tenderness           Posterior Sag          5. Bone enlargement           Valgus Stress          6. No warmth to the touch           Varus Stress           Meniscal Tests   Right   Left    Other   Right    Left       Mc's           Ely's             Joint line tenderness           Dioni             Thessaly Thomas Apley's                        Treatment Today     TREATMENT MINUTES COMMENTS   Evaluation 30    Self-care/ Home management     Manual therapy 10 Induction, indirect, direct techniques utilized as appropriate for optimal tissue release.   MFR/SCS - (L) DFEM-LV, (L) MSG-LV, (L) JESSICA-LV, (L) LSG-LV, (L) LIG-LV    Neuromuscular Re-education     Therapeutic Activity     Therapeutic Exercises 15 Plan of care and goals developed in collaboration with patient.   Discussed findings, instructed in exercises. Needs assist for SLR flex. Adjusted walker to fit patient.    Gait training     Modality__________________                Total 55    Blank areas are intentional and mean the treatment did not include these items.       PT Evaluation Code: (Please list factors)  Patient History/Comorbidities: kidney disease, HTN, hyperlipidemia, OA, (R) knee OA  Examination: 2  Clinical Presentation: evolving  Clinical Decision Making: low    Patient History/  Comorbidities Examination  (body structures and functions, activity limitations, and/or participation restrictions)  Clinical Presentation Clinical Decision Making (Complexity)   No documented Comorbidities or personal factors 1-2 Elements Stable and/or uncomplicated Low   1-2 documented comorbidities or personal factor 3 Elements Evolving clinical presentation with changing characteristics Moderate   3-4 documented comorbidities or personal factors 4 or more Unstable and unpredictable High              Nanette Adkins  11/22/2017  11:09 AM

## 2021-06-14 NOTE — ANESTHESIA PROCEDURE NOTES
Spinal Block    Patient location during procedure: OR  Start time: 12/20/2017 11:32 AM  End time: 12/20/2017 11:36 AM  Reason for block: primary anesthetic    Staffing:  Performing  Anesthesiologist: REVA CONTRERAS    Preanesthetic Checklist  Completed: patient identified, risks, benefits, and alternatives discussed, timeout performed, consent obtained, airway assessed, oxygen available, suction available, emergency drugs available and hand hygiene performed  Spinal Block  Patient position: sitting  Prep: ChloraPrep and site prepped and draped  Patient monitoring: heart rate, continuous pulse ox and blood pressure  Approach: midline  Location: L3-4  Injection technique: single-shot  Needle type: pencil-tip   Needle gauge: 24 G

## 2021-06-14 NOTE — PROGRESS NOTES
"[facility] Daily Progress     Patient Name: Alla Shrestha  Date: 2017  Visit #: 6  PTA visit #:  3  Referral Diagnosis: Primary osteoarthritis of left knee  Referring provider: Martha Boss PAC, Marina Rhoades PAC  Visit Diagnosis:     ICD-10-CM    1. Left knee pain, unspecified chronicity M25.562    2. Knee stiffness, left M25.662    3. Primary osteoarthritis of left knee M17.12    4. History of total left knee replacement Z96.652    5. Essential hypertension I10    6. Mixed hyperlipidemia E78.2    7. Mild intermittent asthma in adult without complication J45.20          Assessment:     HEP/POC compliance is  good .  Patient demonstrates understanding/independence with home program.  Patient is benefitting from skilled physical therapy and is making steady progress toward functional goals.  Patient is appropriate to continue with skilled physical therapy intervention, as indicated by initial plan of care.    Goal Status:  Pt. will demonstrate/verbalize independence in self-management of condition in : 6 weeks  Pt. will be independent with home exercise program in : 6 weeks  Pt. will be able to walk : 20 minutes;with less pain;with less difficulty;for community mobility;for household mobility;in 12 weeks;Comment  Comment:: without AD  Patient will stand : 30 minutes;with less pain;with less difficultty;for home chores;in 12 weeks  Patient will transfer: sit/stand;for car;for toileting;for in/out of bed;for in/out of chair;in 12 weeks  Pt will: be able to drive in 4-6 weeks.     Plan / Patient Education:     Continue with initial plan of care.  Progress with home program as tolerated.       Subjective:     Pain Ratin/10 left knee pain \" I was sick the past few days haven't done ex as much as should.\"   Has been feeling pretty good. Swelling is better with the tubigrip       Objective:   AROM  3-100 degrees left knee ROM  Quad recruitment is fair. Mod ext lag with SLR flex.   Reviewed  " restorator, prone knee bends, prone SLR,knee flex stretch with chair.   tubigrip G for left leg has helped decreased swelling       Treatment Today   10 min late  TREATMENT MINUTES COMMENTS   Evaluation     Self-care/ Home management     Manual therapy     Neuromuscular Re-education     Therapeutic Activity     Therapeutic Exercises 25 Exercises per flow sheet.   Continue to emphasize quad control    Gait training     Modality__________________                Total 25    Blank areas are intentional and mean the treatment did not include these items.       Rekha Forde  12/13/2017

## 2021-06-14 NOTE — PROGRESS NOTES
Alla Shrestha is a 50 y.o. female who is being evaluated via a billable video visit.      How would you like to obtain your AVS? MyChart.  If dropped from the video visit, the video invitation should be resent by: Text to cell phone: [phone]  Will anyone else be joining your video visit? No          [facility] Follow Up Note    Assessment/Plan:    1. ESRD needing dialysis (H)  Patient is currently on hemodialysis, she was on peritoneal dialysis for 2 years but developed peritonitis last summer.  She has a fistula.  She is on a transplant list.  Discussed to make sure she is up-to-date on her health maintenance and complete mammogram and colon cancer screening with Sarah this year.  She is followed by nephrologist and is being treated for vitamin D deficiency, hyperparathyroidism and anemia.  With significant diet adjustments she was able to lose some weight and come off all of her blood pressure medications which was impressive.    2. Seborrheic dermatitis on her face  We will try topical Nasarel  - ketoconazole (NIZORAL) 2 % cream; Apply to face 2ce a day for 3 weeks and then as needed  Dispense: 30 g; Refill: 1    3. Tinea pedis of both feet  Apical Lotrimin  - clotrimazole (LOTRIMIN) 1 % cream; Apply to feet twice a day for 3 weeks and then as needed  Dispense: 113 g; Refill: 2    4. Encounter for smoking cessation counseling  She is smoking 6 cigarettes a day.  She wants to quit smoking.  Chantix worked well in her brother.  Due to kidney disease she could only use small dose of Chantix a day and will order it.  If that does not work she will let me know and we will switch to Wellbutrin.  - varenicline (CHANTIX) 0.5 MG tablet; One tab by mouth daily.  Dispense: 30 tablet; Refill: 2    5. Anemia of chronic renal failure, unspecified CKD stage  She reports recent hemoglobin was 11.1 at hemodialysis    6. Mixed hyperlipidemia  She is on Lipitor and will ask hemodialysis to do additional  blood work.  - Lipid [place] FASTING; Future  - Thyroid Stimulating Hormone (TSH); Future  - Hepatic Profile; Future    7. Colon cancer screening  - Cologuard; Future    8. Encounter for screening mammogram for breast cancer  - Mammo Screening Bilateral; Future    Betty Vazquez MD    Chief Complaint:  Chief Complaint   Patient presents with     Skin Problem     Dox Video: [phone] - possible rash/ ring worm (chin regions) - using otc cream prn        History of Present Illness:  Alla is a 50 y.o. female with  history of end stage kidney disease/HD,  hypertension , anemia, history of fibroids and partial hysterectomy and , obesity, gout,bl knee OA and smoking .  Is currently here for video visit for follow-up.    She was on the peritoneal dialysis due to kidney failure for 2 years but developed acute peritonitis in the summer of last year and currently has been on hemodialysis through fistula on Monday, Wednesday and Friday.  She is on a transplant list.  She is actively trying to lose weight and with dietary limitation she was able to come off her blood pressure medications including lisinopril and amlodipine and blood pressure has been in the normal range and occasionally even low at hemodialysis.  She still making urine once or twice a day.  She has been working from home.    She continues to smoke but is interested in quitting.  She is on 6 cigarettes a day.  She is afraid that smoking cessation will cause weight gain.  Her brother had a lot of success with Chantix.  We can try a small dose of it with her.  Discussed if it does not agree with her we can try Wellbutrin next.    She does have chronic anemia which is managed by her nephrologist.  She reports recent hemoglobin at 11.    Discussed that since she turned 50 she needs to stay on top of her cancer screenings for transplant purposes as well.  We discussed getting mammogram and Cologuard testing this year.    She is also complaining about flaky  skin in her nasolabial folds and in her forehead as well as itchy feet.    Review of Systems:  A comprehensive review of systems was performed and was otherwise negative    PFSH:  Social History: Reviewed  Social History     Tobacco Use   Smoking Status Current Every Day Smoker     Packs/day: 0.30     Years: 20.00     Pack years: 6.00     Types: Cigarettes   Smokeless Tobacco Never Used     Social History     Social History Narrative    Patient lives with her boyfriend.  She has a grown son and a dog.  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       Past History: Reviewed  Current Outpatient Medications   Medication Sig Dispense Refill     acetaminophen (TYLENOL) 500 MG tablet Take 500 mg by mouth every 6 (six) hours as needed.       albuterol (PROAIR HFA;PROVENTIL HFA;VENTOLIN HFA) 90 mcg/actuation inhaler Inhale 2 puffs every 6 (six) hours as needed for wheezing. 1 each 1     aspirin 81 MG EC tablet Take 1 tablet (81 mg total) by mouth daily. Resume taking 7/17/20  0     atorvastatin (LIPITOR) 40 MG tablet Take 1 tablet (40 mg total) by mouth daily. 30 tablet 11     cinacalcet (SENSIPAR) 60 MG tablet Take 60 mg by mouth every other day.        gentamicin (GARAMYCIN) 0.1 % cream Apply 1 application topically daily. To exit site       traZODone (DESYREL) 50 MG tablet Take 1 tablet (50 mg total) by mouth at bedtime as needed for sleep. 90 tablet 1     VELPHORO 500 mg Chew chewable tablet 1,000 mg 5 (five) times a day. Take with meals and snacks.       bumetanide (BUMEX) 1 MG tablet Take 1 mg by mouth daily as needed (fluid overload).       calcitrioL (ROCALTROL) 0.5 MCG capsule Take 0.5 mcg by mouth every other day. Get this inpatient       clotrimazole (LOTRIMIN) 1 % cream Apply to feet twice a day for 3 weeks and then as needed 113 g 2     ketoconazole (NIZORAL) 2 % cream Apply to face 2ce a day for 3 weeks and then as needed 30 g 1     varenicline (CHANTIX) 0.5 MG tablet One tab by mouth daily. 30 tablet  2     No current facility-administered medications for this visit.        Family History: Reviewed    Physical Exam: Limited by video visit    There were no vitals filed for this visit.  Wt Readings from Last 3 Encounters:   07/13/20 (!) 264 lb 1.6 oz (119.8 kg)   07/11/20 (!) 266 lb 1.6 oz (120.7 kg)   04/10/19 (!) 244 lb (110.7 kg)     There is no height or weight on file to calculate BMI.    Constitutional:  Reveals a pleasant female.  Vitals:  Per nursing notes.  Awake alert and oriented, affect is bright.  Conjunctive is pink.  Mild redness and slight scale per patient and her nasolabial folds and on the forehead, no acne noted.  No shortness of breath, cough or respiratory difficulty.      Data Review:    Analysis and Summary of Old Records (2): yes      Records Requested (1): no      Other History Summarized (from other people in the room) (2): no    Radiology Tests Summarized (XRAY/CT/MRI/DXA) (1): no    Labs Reviewed (1): yes    Medicine Tests Reviewed (EKG/ECHO/COLONOSCOPY/EGD) (1): no    Independent Review of EKG or X-RAY (2): no          Video-Visit Details    Type of service:  Video Visit  Video visit start time 8:22 AM  Video End Time (time video stopped): 8:44 AM  Originating Location (pt. Location): Home    Distant Location (provider location):  [facility]     Platform used for Video Visit: Blushr

## 2021-06-14 NOTE — PROGRESS NOTES
[facility] Daily Progress     Patient Name: Alla Shrestha  Date: 2017  Visit #: 2  PTA visit #:  0  Referral Diagnosis: Primary osteoarthritis of left knee  Referring provider: Martha BROWN, Marina Rhoades PAC  Visit Diagnosis:     ICD-10-CM    1. Left knee pain, unspecified chronicity M25.562    2. Knee stiffness, left M25.662    3. Knee osteoarthritis M17.10    4. History of total left knee replacement Z96.652    5. CKD (chronic kidney disease) stage 4, GFR 15-29 ml/min N18.4    6. Essential hypertension I10    7. Mixed hyperlipidemia E78.2    8. Mild intermittent asthma in adult without complication J45.20          Assessment:     Patient is benefitting from skilled physical therapy and is making steady progress toward functional goals.  Patient is appropriate to continue with skilled physical therapy intervention, as indicated by initial plan of care.    Goal Status:  Pt. will demonstrate/verbalize independence in self-management of condition in : 6 weeks  Pt. will be independent with home exercise program in : 6 weeks  Pt. will be able to walk : 20 minutes;with less pain;with less difficulty;for community mobility;for household mobility;in 12 weeks;Comment  Comment:: without AD  Patient will stand : 30 minutes;with less pain;with less difficultty;for home chores;in 12 weeks  Patient will transfer: sit/stand;for car;for toileting;for in/out of bed;for in/out of chair;in 12 weeks  Pt will: be able to drive in 4-6 weeks.     Plan / Patient Education:     Continue with initial plan of care.  Progress with home program as tolerated.    Subjective:     Pain Ratin-7/10  A little tight today. Didn't sleep well. Doing okay with mobility at home. Is able to do a little more at home. Continues to use walker.       Objective:     Quad set is fair. Moderate ext lag with SLR. Requires assist for SLR flex.     Treatment Today     TREATMENT MINUTES COMMENTS   Evaluation     Self-care/ Home  management     Manual therapy 5 Induction, indirect, direct techniques utilized as appropriate for optimal tissue release.   MFR/SCS - (L) MSG-LV, (L) LIG-LV,    Neuromuscular Re-education     Therapeutic Activity     Therapeutic Exercises 23 Exercises per flow sheet.    Gait training     Modality__________________                Total 28    Blank areas are intentional and mean the treatment did not include these items.       Nanette Adkins  11/27/2017

## 2021-06-14 NOTE — ANESTHESIA POSTPROCEDURE EVALUATION
Patient: Alla Shrestha  RIGHT TOTAL KNEE ARTHROPLASTY  Anesthesia type: spinal    Patient location: PACU  Last vitals:   Vitals:    12/20/17 1415   BP:    Pulse:    Resp: 19   Temp:    SpO2:      Post vital signs: stable  Level of consciousness: awake, alert and oriented  Post-anesthesia pain: pain controlled  Post-anesthesia nausea and vomiting: no  Pulmonary: unassisted, return to baseline  Cardiovascular: stable and blood pressure at baseline  Hydration: adequate  Anesthetic events: no    QCDR Measures:  ASA# 11 - Eli-op Cardiac Arrest: ASA11B - Patient did NOT experience unanticipated cardiac arrest  ASA# 12 - Eli-op Mortality Rate: ASA12B - Patient did NOT die  ASA# 13 - PACU Re-Intubation Rate: NA - No ETT / LMA used for case  ASA# 10 - Composite Anes Safety: ASA10A - No serious adverse event    Additional Notes:

## 2021-06-14 NOTE — ANESTHESIA CARE TRANSFER NOTE
Last vitals:   Vitals:    12/20/17 1300   BP: 130/71   Pulse: 80   Resp: 18   Temp:    SpO2: 100%     Patient's level of consciousness is awake  Spontaneous respirations: yes  Maintains airway independently: yes  Dentition unchanged: yes  Oropharynx: oropharynx clear of all foreign objects    QCDR Measures:  ASA# 20 - Surgical Safety Checklist: WHO surgical safety checklist completed prior to induction  PQRS# 430 - Adult PONV Prevention: 4558F - Pt received => 2 anti-emetic agents (different classes) preop & intraop  ASA# 8 - Peds PONV Prevention: NA - Not pediatric patient, not GA or 2 or more risk factors NOT present  PQRS# 424 - Eli-op Temp Management: 4559F - At least one body temp DOCUMENTED => 35.5C or 95.9F within required timeframe  PQRS# 426 - PACU Transfer Protocol: - Transfer of care checklist used  ASA# 14 - Acute Post-op Pain: ASA14B - Patient did NOT experience pain >= 7 out of 10

## 2021-06-14 NOTE — PROGRESS NOTES
[facility] Daily Progress     Patient Name: Alla Shrestha  Date: 2017  Visit #: 4  PTA visit #:  1  Referral Diagnosis: Primary osteoarthritis of left knee  Referring provider: Martha Boss PAC, Marina Rhoades PAC  Visit Diagnosis:     ICD-10-CM    1. Left knee pain, unspecified chronicity M25.562    2. Knee stiffness, left M25.662    3. Primary osteoarthritis of left knee M17.12    4. History of total left knee replacement Z96.652    5. Knee osteoarthritis M17.10    6. CKD (chronic kidney disease) stage 4, GFR 15-29 ml/min N18.4    7. Essential hypertension I10    8. Mixed hyperlipidemia E78.2    9. Mild intermittent asthma in adult without complication J45.20          Assessment:     HEP/POC compliance is  good .  Patient demonstrates understanding/independence with home program.  Patient is benefitting from skilled physical therapy and is making steady progress toward functional goals.  Patient is appropriate to continue with skilled physical therapy intervention, as indicated by initial plan of care.    Goal Status:  Pt. will demonstrate/verbalize independence in self-management of condition in : 6 weeks  Pt. will be independent with home exercise program in : 6 weeks  Pt. will be able to walk : 20 minutes;with less pain;with less difficulty;for community mobility;for household mobility;in 12 weeks;Comment  Comment:: without AD  Patient will stand : 30 minutes;with less pain;with less difficultty;for home chores;in 12 weeks  Patient will transfer: sit/stand;for car;for toileting;for in/out of bed;for in/out of chair;in 12 weeks  Pt will: be able to drive in 4-6 weeks.     Plan / Patient Education:     Continue with initial plan of care.  Progress with home program as tolerated.   Scheduled for surgery on other knee on 17.     Subjective:     Pain Ratin  Sees MD tomorrow.  Has been feeling pretty good. Swelling is better. Still having trouble with leg lifts.       Objective:     Quad  recruitment is fair. Mod ext lag with SLR flex.  Needs mild assist with SLR flex due to limited quad control.       Treatment Today   10 min late  TREATMENT MINUTES COMMENTS   Evaluation     Self-care/ Home management     Manual therapy     Neuromuscular Re-education     Therapeutic Activity     Therapeutic Exercises 23 Exercises per flow sheet.   Continue to emphasize quad control with isometrics and SLR ex. Added LAQ, standing quad set.   Gait training     Modality__________________                Total 23    Blank areas are intentional and mean the treatment did not include these items.       Nanette Adkins  12/4/2017

## 2021-06-14 NOTE — ANESTHESIA CARE TRANSFER NOTE
Last vitals: /73, HR 74, SaO2 100%, RR 20, T 97.6.  Vitals:    11/15/17 1244   BP: 161/77   Pulse:    Resp:    Temp:    SpO2:      Patient's level of consciousness is awake  Spontaneous respirations: yes  Maintains airway independently: yes  Dentition unchanged: yes  Oropharynx: oropharynx clear of all foreign objects    QCDR Measures:  ASA# 20 - Surgical Safety Checklist: WHO surgical safety checklist completed prior to induction  PQRS# 430 - Adult PONV Prevention: 4558F - Pt received => 2 anti-emetic agents (different classes) preop & intraop  ASA# 8 - Peds PONV Prevention: NA - Not pediatric patient, not GA or 2 or more risk factors NOT present  PQRS# 424 - Eli-op Temp Management: 4559F - At least one body temp DOCUMENTED => 35.5C or 95.9F within required timeframe  PQRS# 426 - PACU Transfer Protocol: - Transfer of care checklist used  ASA# 14 - Acute Post-op Pain: ASA14B - Patient did NOT experience pain >= 7 out of 10

## 2021-06-14 NOTE — PROGRESS NOTES
Patient arrived for appointment, but is ill and chose to reschedule.   She rescheduled for 12/15/17.    Nanette Adkins, PT

## 2021-06-14 NOTE — ANESTHESIA PROCEDURE NOTES
Peripheral Block    Patient location during procedure: pre-op  Start time: 12/20/2017 10:44 AM  End time: 12/20/2017 10:46 AM  post-op analgesia per surgeon order as noted in medical record  Staffing:  Performing  Anesthesiologist: ANGIE GOLDEN  Preanesthetic Checklist  Completed: patient identified, site marked, risks, benefits, and alternatives discussed, timeout performed, consent obtained, at patient's request, airway assessed, oxygen available, suction available, emergency drugs available and hand hygiene performed  Peripheral Block  Block type: saphenous  Prep: ChloraPrep  Patient position: supine  Patient monitoring: cardiac monitor, continuous pulse oximetry, heart rate and blood pressure  Laterality: right  Injection technique: ultrasound guided    Ultrasound used to visualize needle placement in proximity to nerve being blocked: yes   Permanent ultrasound image captured for medical record    Needle  Needle type: Stimuplex   Needle gauge: 20G  Needle length: 4 in  no peripheral nerve catheter placed  Assessment  Injection assessment: no difficulty with injection, negative aspiration for heme, no paresthesia on injection and incremental injection  Additional Notes  No issues.  Vss.  No signs of LAST.

## 2021-06-14 NOTE — TELEPHONE ENCOUNTER
Patient gets hemodialysis and [facility] in [facility].  Please ask them if they would do additional blood tests during her hemodialysis session (orders placed in the computer) and fax us the report.

## 2021-06-14 NOTE — ANESTHESIA PROCEDURE NOTES
Peripheral Block    Patient location during procedure: pre-op  Start time: 12/20/2017 10:42 AM  End time: 12/20/2017 10:44 AM  post-op analgesia per surgeon order as noted in medical record  Staffing:  Performing  Anesthesiologist: ANGIE GOLDEN  Preanesthetic Checklist  Completed: patient identified, site marked, risks, benefits, and alternatives discussed, timeout performed, consent obtained, at patient's request, airway assessed, oxygen available, suction available, emergency drugs available and hand hygiene performed  Peripheral Block  Block type: other (selective tibial)  Prep: ChloraPrep  Patient position: left lateral decubitus  Patient monitoring: cardiac monitor, continuous pulse oximetry, heart rate and blood pressure  Laterality: right  Injection technique: ultrasound guided    Ultrasound used to visualize needle placement in proximity to nerve being blocked: yes   Permanent ultrasound image captured for medical record    Needle  Needle type: Stimuplex   Needle gauge: 20G  Needle length: 4 in  no peripheral nerve catheter placed  Assessment  Injection assessment: no difficulty with injection, negative aspiration for heme, no paresthesia on injection and incremental injection  Additional Notes  No issues.  Block working well.  No signs of LAST.

## 2021-06-14 NOTE — TELEPHONE ENCOUNTER
[email or web address] and [facility]  Lab orders faxed to [phone].   Rhombic Flap Text: The defect edges were debeveled with a #15 scalpel blade.  Given the location of the defect and the proximity to free margins a rhombic flap was deemed most appropriate.  Using a sterile surgical marker, an appropriate rhombic flap was drawn incorporating the defect.    The area thus outlined was incised deep to adipose tissue with a #15 scalpel blade.  The skin margins were undermined to an appropriate distance in all directions utilizing iris scissors.

## 2021-06-14 NOTE — ANESTHESIA PROCEDURE NOTES
Spinal Block    Patient location during procedure: OR  Start time: 11/15/2017 1:12 PM  End time: 11/15/2017 1:17 PM  Reason for block: at surgeon's request and primary anesthetic    Staffing:  Performing  Anesthesiologist: MARE TENA    Preanesthetic Checklist  Completed: patient identified, risks, benefits, and alternatives discussed, timeout performed, consent obtained, at patient's request, airway assessed, oxygen available, suction available, emergency drugs available and hand hygiene performed  Spinal Block  Patient position: sitting  Prep: ChloraPrep  Patient monitoring: heart rate, cardiac monitor, continuous pulse ox and blood pressure  Approach: midline  Location: L4-5  Injection technique: single-shot  Needle type: pencil-tip   Needle gauge: 24 G      Additional Notes:  Difficult & deep approach.  Attempted 3-4, but anatomy felt more reliable at 4-5 92 (attepmts)

## 2021-06-14 NOTE — PROGRESS NOTES
[facility] Follow Up Note    Assessment/Plan:    1. Knee joint replacement status, left  Patient is doing well, she is doing home physical therapy.  She will be on Coumadin for clot prophylaxis and we enrolled her in anticoagulation clinic today.  Her INR goal would be between 2 and 2.5 given her chronic anemia.  - INR; Standing  - Ambulatory referral to Anticoagulation Monitoring  - HM1(CBC and Differential)  - Basic Metabolic Panel  - Iron and Transferrin Iron Binding Capacity  - HM1 (CBC with Diff)  - INR    2. Chronic renal impairment, stage 4 (severe)  Postoperatively creatinine has gotten slightly worse from 4.5-5.5.  Potassium 2 days ago was normal.  She is currently off lisinopril and her blood pressure is well controlled on Lopressor.  Repeat BMP again today.  She will be seeing her nephrologist in 2 days.  She has not used Blue Max full extremity edema and I would defer to her nephrologist to decide when she can start using it again  - HM1(CBC and Differential)  - Basic Metabolic Panel  - Iron and Transferrin Iron Binding Capacity  - HM1 (CBC with Diff)    3. Anemia  Postop hemoglobin decreased from 8.8-7.5.  Patient is currently asymptomatic.  Will repeat hemoglobin again today.  She has chronic anemia due to renal insufficiency and has been seeing her hematologist every 2 weeks for iron infusions and Procrit injections.  She will be seeing her hematologist next week.  - Basic Metabolic Panel  - Iron and Transferrin Iron Binding Capacity    4. Smoking  Smoking cessation was discussed.  Will order nicotine patch and gum.  She has not required inhaler use recently.  Her lungs are clear.  She will continue incentive spirometry.  - nicotine (NICODERM CQ) 14 mg/24 hr; Place 1 patch on the skin daily.  Dispense: 30 patch; Refill: 2  - nicotine polacrilex (NICORETTE) 4 MG gum; Apply 1 each (4 mg total) to the mouth or throat as needed for smoking cessation.  Dispense: 100 each; Refill: 2    5.  Constipation  Due to narcotic medications for pain control.  We will start with MiraLAX.  If it is not strong enough, we discussed using scented.  - polyethylene glycol (MIRALAX) 17 gram packet; Take 1 packet (17 g total) by mouth daily.  Dispense: 30 packet; Refill: 11      Betty Vazquez MD    Chief Complaint:  Chief Complaint   Patient presents with     Hospital Visit Follow Up     S/P (L) MARLI  - (R) to be done on 12/20       History of Present Illness:  Alla is a 47 y.o. female with history of stage IV kidney disease secondary to uncontrolled hypertension and associated anemia and acidemia, history of fibroids and hysterectomy and oophorectomy, obesity, gout, left knee pain and smoking who is currently here for follow-up from recent left total knee replacement.    Patient has chronic renal insufficiency stage IV but not on hemodialysis yet and has associated acidemia and anemia.  Because of chronic significant pain and problems with ambulation she really wanted to have that knee replacement done.  She work hard preoperatively with hematologist to increase her hemoglobin levels with IV iron infusions and Procrit.  Risk of worsening renal function postoperatively was discussed with her and she was aware of the risk.  Currently her surgery went well.  Postoperatively she did have increase in creatinine from 4.5-5.5 and so nephrologist impatiently.  They recommended stopping her ACE inhibitor and not using any NSAIDs.  Postoperative hemoglobin after surgery was 7.5.  Currently patient denies any shortness of breath dizziness or lightheadedness.  She is at home and doing physical therapy.  She is on Coumadin for clot prophylaxis.  Since she has been off lisinopril she is on Lopressor for blood pressure control and blood pressure today is good.  In the past she also used Bumex as needed for edema but has not used that.  She will be seeing her nephrologist in 2 days and I recommended to wait until she sees her  kidney doctor in regards of restarting her lisinopril Blue Max.  Will check her potassium and kidney function today.  She will be enrolled in anticoagulation clinic to help dose her Coumadin levels.    Patient has chronic anemia of chronic renal insufficiency.  She will be seeing her hematologist in 10 days.  Postoperative hemoglobin was 7.5.  We will repeat it again today to make sure that it has not gotten worse.  Patient denies any symptoms from anemia.  She is supposed to get another shot of Procrit later this month.    Patient is a smoker but is trying to cut down.  She denies any shortness of breath or cough.  She has been using spirometer at home.  She cut back to half a pack a day.  On exam today is normal.  We discussed nicotine patches and gum.    Patient's pain is controlled on oxycodone.  She has had several bowel movements but would like prescription for a laxative.  MiraLAX was prescribed.  We also discussed use of senna if she needs something stronger.    Review of Systems:  A comprehensive review of systems was performed and was otherwise negative    PFSH:  Social History: Reviewed  History   Smoking Status     Never Smoker   Smokeless Tobacco     Never Used     Social History     Social History Narrative    Patient lives with her boyfriend.  She has a grown son and a dog.  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       Past History: Reviewed  Current Outpatient Prescriptions   Medication Sig Dispense Refill     acetaminophen (TYLENOL) 500 MG tablet Take 2 tablets (1,000 mg total) by mouth 3 (three) times a day.  0     albuterol (PROAIR HFA;PROVENTIL HFA;VENTOLIN HFA) 90 mcg/actuation inhaler Inhale 2 puffs every 6 (six) hours as needed for wheezing. 1 each 0     atorvastatin (LIPITOR) 40 MG tablet Take 1 tablet (40 mg total) by mouth daily. 30 tablet 11     bumetanide (BUMEX) 1 MG tablet Take 1 mg by mouth 3 (three) times a day as needed. As needed for fluid retention in lower extremeties  "      calcitriol (ROCALTROL) 0.25 MCG capsule Take 1 capsule (0.25 mcg total) by mouth daily. 30 capsule 0     metoprolol tartrate (LOPRESSOR) 25 MG tablet Take 1 tablet (25 mg total) by mouth 2 (two) times a day.  0     nicotine (NICODERM CQ) 14 mg/24 hr Place 1 patch on the skin daily. 30 patch 2     nicotine polacrilex (NICORETTE) 4 MG gum Apply 1 each (4 mg total) to the mouth or throat as needed for smoking cessation. 100 each 2     oxyCODONE (ROXICODONE) 5 MG immediate release tablet Take 1-2 tablets (5-10 mg total) by mouth every 4 (four) hours as needed for pain. Take 1 tab for pain 1-6/10, take 2 tabs for pain 7-10/10. 60 tablet 0     polyethylene glycol (MIRALAX) 17 gram packet Take 1 packet (17 g total) by mouth daily. 30 packet 11     senna-docusate (PERICOLACE) 8.6-50 mg tablet Take 1 tablet by mouth 2 (two) times a day.  0     traZODone (DESYREL) 50 MG tablet Take 0.5 tablets (25 mg total) by mouth at bedtime. (Patient taking differently: Take 25 mg by mouth at bedtime as needed for sleep. ) 30 tablet 11     warfarin (COUMADIN) 5 MG tablet Take 1 tablet (5mg) by mouth daily. Adjust dose based on INR results as directed. 30 tablet 0     No current facility-administered medications for this visit.        Physical Exam:    Vitals:    11/20/17 1636   BP: 138/86   Patient Site: Left Arm   Patient Position: Sitting   Cuff Size: Adult Large   Pulse: 88   Resp: 18   SpO2: 96%   Weight: (!) 277 lb 4 oz (125.8 kg)   Height: 5' 7\" (1.702 m)     Wt Readings from Last 3 Encounters:   11/20/17 (!) 277 lb 4 oz (125.8 kg)   11/15/17 (!) 258 lb 1.6 oz (117.1 kg)   10/23/17 (!) 261 lb 9.6 oz (118.7 kg)     Body mass index is 43.42 kg/(m^2).    Constitutional:  Reveals a pleasant female, currently here with her  and is in the wheelchair.  Vitals:  Per nursing notes.  HEENT:No cervical LAD, no thyromegaly,  conjunctiva is pink, no scleral icterus, TMs are visualized and normal bl, oropharynx is clear, no exudates, "   Cardiac:  Regular rate and rhythm,no murmurs, rubs, or gallops. Legs with mild edema left more than right, patient has SEBASTIAN hose stocking on the left calf.. Palpation of the radial pulse regular.  Lungs: Clear to auscultation bl.  Respiratory effort normal.  No wheezes or rales.  Abdomen:positive BS, soft, nontender, nondistended.  No hepato-splenomagaly  Skin:   Well-healed incision over her left knee without associated cellulitis or drainage.  Dressings is clean and intact.  Rheumatologic: Normal joints and nails of the hands.  Neurologic:  Cranial nerves II-XII intact.     Psychiatric: affect appropriate, memory intact.     Data Review:    Analysis and Summary of Old Records (2): Yes    Records Requested (1): *No    Other History Summarized (from other people in the room) (2): No    Radiology Tests Summarized (XRAY/CT/MRI/DXA) (1): No    Labs Reviewed (1): *Yes    Medicine Tests Reviewed (EKG/ECHO/COLONOSCOPY/EGD) (1): No    Independent Review of EKG or X-RAY (2): No

## 2021-06-15 NOTE — PROGRESS NOTES
[facility] Follow Up Note    Assessment/Plan:    1. S/P total knee arthroplasty, right  Doing well. Prefers to do out pt PT rather then home PT, On coumadin post op x 6 weeks  - INR; Standing    2. Warfarin anticoagulation  Will check levels today  - INR; Standing    3. CKD (chronic kidney disease) stage 4, GFR 15-29 ml/min  Has fistula but has not been on HD yet. Creat, K  Stable, will check BMP today . F/u with nephrology Jan 4th  - Basic Metabolic Panel    4. Essential hypertension  controlled    5. Anemia, chronic renal failure, stage 4 (severe)  Chronic due to CKD and Fe def. Has appt with heme today. On IV Fe and Aranesp.    Betty Vazquez MD    Chief Complaint:  Chief Complaint   Patient presents with     Follow-up     Knee replacement       History of Present Illness:  Alla is a 47 y.o. female  with history of stage IV kidney disease secondary to uncontrolled hypertension and associated anemia and acidemia, history of fibroids and hysterectomy and oophorectomy, obesity, gout,bl knee OA and smoking who is currently here for follow-up from recent right total knee replacement.    Patient had left knee replaced in November which went well and most recently had right knee replaced on December 22.  Patient does have severe kidney disease with baseline creatinine of 5.  She has discussed with her nephrologist risks of the surgery and was willing to accept it.  Postsurgically her creatinine has been stable and potassium level was good.  Her surgery was done as a regional anesthesia rather than general anesthesia to decrease strain on her kidneys.  Will check BMP today.  Last BMP was done a week ago and showed normal potassium and creatinine at 5.5.  Patient does have right arm fistula which has not been used.    Patient also has chronic anemia secondary to significant iron deficiency and renal insufficiency.  She is seeing hematologist regularly.  She gets IV iron infusions and Aranesp.   Preoperatively her hemoglobin was 9.4 and postoperatively 7.5.  Patient has appointment there today.  I will defer to her hematologist to repeat hemoglobin levels and iron levels.    Patient is trying to quit smoking.  Currently she is using nicotine patch 14 mg a day and a gum.  She has not touched cigarettes.  She denies any cough or shortness of breath.    Review of Systems:  A comprehensive review of systems was performed and was otherwise negative.  She denies any dizziness or lightheadedness.  Her blood pressure is good today.  She is participating in physical therapy.  Her right knee is somewhat swollen due to surgery.  She has not restarted her diuretic and will check with nephrologist first. No CHF Sx.    PFSH:  Social History: reviewed  History   Smoking Status     Current Every Day Smoker     Packs/day: 0.30     Years: 20.00     Types: Cigarettes   Smokeless Tobacco     Never Used     Social History     Social History Narrative    Patient lives with her boyfriend.  She has a grown son and a dog.  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       Past History: reviewed  Current Outpatient Prescriptions   Medication Sig Dispense Refill     acetaminophen (TYLENOL) 500 MG tablet Take 2 tablets (1,000 mg total) by mouth 3 (three) times a day.  0     albuterol (PROAIR HFA;PROVENTIL HFA;VENTOLIN HFA) 90 mcg/actuation inhaler Inhale 2 puffs every 6 (six) hours as needed for wheezing. 1 each 0     atorvastatin (LIPITOR) 40 MG tablet Take 1 tablet (40 mg total) by mouth daily. 30 tablet 11     bumetanide (BUMEX) 1 MG tablet Take 1 mg by mouth 3 (three) times a day as needed. As needed for fluid retention in lower extremeties       calcitriol (ROCALTROL) 0.25 MCG capsule Take 1 capsule (0.25 mcg total) by mouth daily. 30 capsule 0     metoprolol tartrate (LOPRESSOR) 25 MG tablet Take 1 tablet (25 mg total) by mouth 2 (two) times a day.  0     nicotine (NICODERM CQ) 14 mg/24 hr Place 1 patch on the skin  daily. 30 patch 2     nicotine polacrilex (NICORETTE) 4 MG gum Apply 1 each (4 mg total) to the mouth or throat as needed for smoking cessation. 100 each 2     oxyCODONE (ROXICODONE) 5 MG immediate release tablet Take 1-2 tablets (5-10 mg total) by mouth every 4 (four) hours as needed for pain. 60 tablet 0     senna-docusate (SENNOSIDES-DOCUSATE SODIUM) 8.6-50 mg tablet Take 1 tablet by mouth daily.       sodium bicarbonate 650 MG tablet Take 648 mg by mouth 2 (two) times a day.       traZODone (DESYREL) 50 MG tablet Take 50 mg by mouth at bedtime as needed for sleep.       warfarin (COUMADIN) 5 MG tablet Take 7.5mg daily until next INR draw, then take daily as directed. 60 tablet 0     No current facility-administered medications for this visit.        Physical Exam:    Vitals:    12/28/17 1114   BP: 134/84   Patient Site: Left Arm   Patient Position: Sitting   Cuff Size: Adult Large   Pulse: 92   SpO2: 99%   Weight: (!) 264 lb (119.7 kg)     Wt Readings from Last 3 Encounters:   12/28/17 (!) 264 lb (119.7 kg)   12/20/17 (!) 245 lb (111.1 kg)   11/20/17 (!) 277 lb 4 oz (125.8 kg)     Body mass index is 41.35 kg/(m^2).    Constitutional:  Reveals a pleasant female.  Vitals:  Per nursing notes.  HEENT:No cervical LAD, no thyromegaly,  conjunctiva is pink, no scleral icterus, TMs are visualized and normal bl, oropharynx is clear, no exudates,   Cardiac:  Regular rate and rhythm,no murmurs, rubs, or gallops. Legs with 1+edema. Palpation of the radial pulse regular.  Lungs: Clear to auscultation bl.  Respiratory effort normal.  Abdomen:positive BS, soft, nontender, nondistended.  No hepato-splenomagaly  Skin:   Without rash, bruise, or palpable lesions. Right knee incision is Clean, dry intact.  Neurologic:  Cranial nerves II-XII intact.     Psychiatric: affect appropriate, memory intact.     Data Review:    Analysis and Summary of Old Records (2): yes      Records Requested (1): yes    Other History Summarized (from  other people in the room) (2): no    Radiology Tests Summarized (XRAY/CT/MRI/DXA) (1): no    Labs Reviewed (1): yes    Medicine Tests Reviewed (EKG/ECHO/COLONOSCOPY/EGD) (1): no    Independent Review of EKG or X-RAY (2): no

## 2021-06-15 NOTE — PROGRESS NOTES
[facility] Daily Progress     Patient Name: Alla Shrestha  Date: 1/16/2018  Visit #: 4  PTA visit #:  2  Referral Diagnosis: Primary osteoarthritis of right knee  Referring provider: Justice Willingham PAC  Visit Diagnosis:     ICD-10-CM    1. Primary osteoarthritis of right knee M17.11    2. History of total right knee replacement Z96.651    3. Knee stiffness, right M25.661    4. Swelling of knee joint, right M25.461    5. Abnormality of gait R26.9    6. Essential hypertension I10    7. Mixed hyperlipidemia E78.2    8. Mild intermittent asthma in adult without complication J45.20    9. CKD (chronic kidney disease) stage 4, GFR 15-29 ml/min N18.4    10. Left knee pain, unspecified chronicity M25.562    11. Knee stiffness, left M25.662    12. Primary osteoarthritis of left knee M17.12    13. History of total left knee replacement Z96.652          Assessment:     HEP/POC compliance is  good .  Patient demonstrates understanding/independence with home program.  Response to Intervention good  Patient is appropriate to continue with skilled physical therapy intervention, as indicated by initial plan of care.   Increased right knee flex     Goal Status:Ongoing  Pt. will demonstrate/verbalize independence in self-management of condition in : 6 weeks  Pt. will be independent with home exercise program in : 6 weeks  Pt. will be able to walk : 30 minutes;around the house;with less pain;with less difficulty;for household mobility;for community mobility;in 12 weeks  Comment:: without AD   status: able to ambulate at home without walker  Patient will stand : 30 minutes;with less pain;with less difficultty;for home chores;in 12 weeks  Patient will sit: 30 minutes;for driving;for work;with less pain;with less difficultty;in 6 weeks  Patient will transfer: sit/stand;for car;for toileting;for in/out of bed;for in/out of chair;in 12 weeks;Partially met  Patient will work: without restrictions;in 6 weeks  Patient will  return to: other activity;in 4 weeks  other activity: driving  Pt will: be able to drive in 4-6 weeks.    status: is driving now. Met    Plan / Patient Education:     Continue with initial plan of care.  Progress with home program as tolerated.   Reformer, step ups and TKE with band  Reapply KT if helpful    Subjective:     Pain Ratin/10 the right knee is sore and tight feeling  Plan to go to work on the 29 of this month  Sleeping is good   Continues to work with CPM, 30 min 2x/day. Set at 95 on right, 115 on left  KT helped      Objective:     Quad recruitment improved  Able to perform SLR veronique today  Added prone knee bends, prone SLR, sidelying SLR, standing knee bends, heel/toe raises, squats to chair  Tolerated new ex well     Treatment Today     TREATMENT MINUTES COMMENTS   Evaluation     Self-care/ Home management     Manual therapy  Induction, indirect, direct techniques utilized as appropriate for optimal tissue release.   MFR/SCS - (R) SAPH-LV, (R) POP-LV, (R) DFEM-LV,(R)  MSG-LV/JESSICA-LV/LIG-LV   Neuromuscular Re-education     Therapeutic Activity     Therapeutic Exercises 30 Exercises per flow sheet./gait training step through pattern     Gait training     Modality__________________                Total 30    Blank areas are intentional and mean the treatment did not include these items.       Rekha Forde  2018

## 2021-06-15 NOTE — PROGRESS NOTES
[facility] Daily Progress     Patient Name: Alla Shrestha  Date: 2018  Visit #: 8  PTA visit #:  6  Referral Diagnosis: Primary osteoarthritis of right knee  Referring provider: Justice Willingham PAC  Visit Diagnosis:     ICD-10-CM    1. Primary osteoarthritis of right knee M17.11    2. History of total right knee replacement Z96.651    3. Knee stiffness, right M25.661    4. Swelling of knee joint, right M25.461    5. Abnormality of gait R26.9          Assessment:     HEP/POC compliance is  good .  Patient demonstrates understanding/independence with home program.  Response to Intervention good  Patient is appropriate to continue with skilled physical therapy intervention, as indicated by initial plan of care.   Walking without AD inside the house    Goal Status:Ongoing  Pt. will demonstrate/verbalize independence in self-management of condition in : 6 weeks  Pt. will be independent with home exercise program in : 6 weeks  Pt. will be able to walk : 30 minutes;around the house;with less pain;with less difficulty;for household mobility;for community mobility;in 12 weeks  Comment:: without AD   status: able to ambulate at home without walker  Patient will stand : 30 minutes;with less pain;with less difficultty;for home chores;in 12 weeks  Patient will sit: 30 minutes;for driving;for work;with less pain;with less difficultty;in 6 weeks  Patient will transfer: sit/stand;for car;for toileting;for in/out of bed;for in/out of chair;in 12 weeks;Partially met  Patient will work: without restrictions;in 6 weeks  Patient will return to: other activity;in 4 weeks  other activity: driving  Pt will: be able to drive in 4-6 weeks.    status: is driving now. Met    Plan / Patient Education:     Continue with initial plan of care.  Progress with home program as tolerated.   Plans to return to work on 18  Assess goals      Subjective:     Pain Ratin/10 the right knee is sore and tight feeling, when I am  busy, morning routine  Reports times without pain  Saw MD today plan on going back to work on 2/20/18  Medication 2x/day  Sleeping is good      Objective:   Right knee ROM  0-105 degrees   contniued  reformer 2 yellow bands and blue   Double knee/single knee  Added bridging, sidelying  Hip ABD, and prone SLR   SLS with fniger tip support 5 sec veronique  Tolerated new ex well     Treatment Today     TREATMENT MINUTES COMMENTS   Evaluation     Self-care/ Home management     Manual therapy  Induction, indirect, direct techniques utilized as appropriate for optimal tissue release.   MFR/SCS - (R) SAPH-LV, (R) POP-LV, (R) DFEM-LV,(R)  MSG-LV/JESSICA-LV/LIG-LV   Neuromuscular Re-education     Therapeutic Activity     Therapeutic Exercises 30 Exercises per flow sheet./gait training step through pattern     Gait training     Modality__________________                Total 30    Blank areas are intentional and mean the treatment did not include these items.       Rekha Forde  2/6/2018

## 2021-06-15 NOTE — PROGRESS NOTES
[facility] Daily Progress     Patient Name: Alla Shrestha  Date: 1/11/2018  Visit #: 3  PTA visit #:  1  Referral Diagnosis: Primary osteoarthritis of right knee  Referring provider: Justice Willingham PAC  Visit Diagnosis:     ICD-10-CM    1. Primary osteoarthritis of right knee M17.11    2. History of total right knee replacement Z96.651    3. Knee stiffness, right M25.661    4. Swelling of knee joint, right M25.461    5. Abnormality of gait R26.9    6. Essential hypertension I10    7. Mixed hyperlipidemia E78.2    8. Mild intermittent asthma in adult without complication J45.20    9. CKD (chronic kidney disease) stage 4, GFR 15-29 ml/min N18.4    10. Left knee pain, unspecified chronicity M25.562    11. Knee stiffness, left M25.662    12. Primary osteoarthritis of left knee M17.12    13. History of total left knee replacement Z96.652          Assessment:     HEP/POC compliance is  good .  Patient demonstrates understanding/independence with home program.  Response to Intervention good  Patient is appropriate to continue with skilled physical therapy intervention, as indicated by initial plan of care.   Increased right knee flex     Goal Status:Ongoing  Pt. will demonstrate/verbalize independence in self-management of condition in : 6 weeks  Pt. will be independent with home exercise program in : 6 weeks  Pt. will be able to walk : 30 minutes;around the house;with less pain;with less difficulty;for household mobility;for community mobility;in 12 weeks  Comment:: without AD   status: able to ambulate at home without walker  Patient will stand : 30 minutes;with less pain;with less difficultty;for home chores;in 12 weeks  Patient will sit: 30 minutes;for driving;for work;with less pain;with less difficultty;in 6 weeks  Patient will transfer: sit/stand;for car;for toileting;for in/out of bed;for in/out of chair;in 12 weeks;Partially met  Patient will work: without restrictions;in 6 weeks  Patient will  return to: other activity;in 4 weeks  other activity: driving  Pt will: be able to drive in 4-6 weeks.    status: is driving now. Met    Plan / Patient Education:     Continue with initial plan of care.  Progress with home program as tolerated.    Subjective:     Pain Ratin/10 the right knee is sore and tight feeling  Sleeping is good   Continues to work with CPM, 30 min 2x/day. Set at 80 deg discussed increase to 85 deg      Objective:     Quad recruitment improved   R knee AROM 4-90 degrees  Able to perform SLR veronique today   Tenderness of med/lat post knee.  KT to med/lat post instructions/precautions reviewed HO issued    Treatment Today     TREATMENT MINUTES COMMENTS   Evaluation     Self-care/ Home management     Manual therapy  Induction, indirect, direct techniques utilized as appropriate for optimal tissue release.   MFR/SCS - (R) SAPH-LV, (R) POP-LV, (R) DFEM-LV,(R)  MSG-LV/JESSICA-LV/LIG-LV   Neuromuscular Re-education     Therapeutic Activity     Therapeutic Exercises 25 Exercises per flow sheet./gait training step through pattern     Gait training     Modality__________________                Total 25    Blank areas are intentional and mean the treatment did not include these items.       Rekha Forde  2018

## 2021-06-15 NOTE — PROGRESS NOTES
[facility] Daily Progress     Patient Name: Alla Shrestha  Date: 1/18/2018  Visit #: 5  PTA visit #:  3  Referral Diagnosis: Primary osteoarthritis of right knee  Referring provider: Justice Willingham PAC  Visit Diagnosis:     ICD-10-CM    1. Primary osteoarthritis of right knee M17.11    2. History of total right knee replacement Z96.651    3. Knee stiffness, right M25.661    4. Swelling of knee joint, right M25.461    5. Abnormality of gait R26.9    6. Essential hypertension I10    7. Left knee pain, unspecified chronicity M25.562    8. Knee stiffness, left M25.662    9. History of total left knee replacement Z96.652          Assessment:     HEP/POC compliance is  good .  Patient demonstrates understanding/independence with home program.  Response to Intervention good  Patient is appropriate to continue with skilled physical therapy intervention, as indicated by initial plan of care.   Increased right knee flex     Goal Status:Ongoing  Pt. will demonstrate/verbalize independence in self-management of condition in : 6 weeks  Pt. will be independent with home exercise program in : 6 weeks  Pt. will be able to walk : 30 minutes;around the house;with less pain;with less difficulty;for household mobility;for community mobility;in 12 weeks  Comment:: without AD   status: able to ambulate at home without walker  Patient will stand : 30 minutes;with less pain;with less difficultty;for home chores;in 12 weeks  Patient will sit: 30 minutes;for driving;for work;with less pain;with less difficultty;in 6 weeks  Patient will transfer: sit/stand;for car;for toileting;for in/out of bed;for in/out of chair;in 12 weeks;Partially met  Patient will work: without restrictions;in 6 weeks  Patient will return to: other activity;in 4 weeks  other activity: driving  Pt will: be able to drive in 4-6 weeks.    status: is driving now. Met    Plan / Patient Education:     Continue with initial plan of care.  Progress with  home program as tolerated.   Reformer, step ups and TKE with band  Reapply KT if helpful    Subjective:     Pain Ratin/10 the right knee is sore and tight feeling  Reports doing some house hold chores, groceries shopping for and hour using walker yesterday  Sleeping is good   Continues to work with CPM, 30 min 2x/day. Set at 96 on right, 120 degrees on left      Objective:   Right knee ROM  0-97 degrees   Quad recruitment improved  Added reformer 2 yellow bands  Double knee presses   reviewed prone knee bends, prone SLR, sidelying SLR, standing knee bends, heel/toe raises, squats to chair  Added TKE with OTB standing step ups, side steps, retro step with 4 inch step 10x veronique  KT to right knee lateral and medial strip   Tolerated new ex well     Treatment Today     TREATMENT MINUTES COMMENTS   Evaluation     Self-care/ Home management     Manual therapy  Induction, indirect, direct techniques utilized as appropriate for optimal tissue release.   MFR/SCS - (R) SAPH-LV, (R) POP-LV, (R) DFEM-LV,(R)  MSG-LV/JESSICA-LV/LIG-LV   Neuromuscular Re-education     Therapeutic Activity     Therapeutic Exercises 30 Exercises per flow sheet./gait training step through pattern     Gait training     Modality__________________                Total 30    Blank areas are intentional and mean the treatment did not include these items.       Rekha Forde  2018

## 2021-06-15 NOTE — PROGRESS NOTES
[facility] Daily Progress     Patient Name: Alla Shrestha  Date: 2018  Visit #: 9  PTA visit #:  6+1  Referral Diagnosis: Primary osteoarthritis of right knee  Referring provider: STEPHANIE Ansari  Visit Diagnosis:     ICD-10-CM    1. Primary osteoarthritis of right knee M17.11    2. History of total right knee replacement Z96.651    3. Knee stiffness, right M25.661    4. Swelling of knee joint, right M25.461    5. Abnormality of gait R26.9    6. Essential hypertension I10          Assessment:     HEP/POC compliance is  good .  Patient demonstrates understanding/independence with home program.  Response to Intervention good  Patient is appropriate to continue with skilled physical therapy intervention, as indicated by initial plan of care.   Walking without AD inside the house    Goal Status:Ongoing  Pt. will demonstrate/verbalize independence in self-management of condition in : 6 weeks  Pt. will be independent with home exercise program in : 6 weeks  Pt. will be able to walk : 30 minutes;around the house;with less pain;with less difficulty;for household mobility;for community mobility;in 12 weeks  Comment:: without AD   status: able to ambulate at home without walker  Patient will stand : 30 minutes;with less pain;with less difficultty;for home chores;in 12 weeks  Patient will sit: 30 minutes;for driving;for work;with less pain;with less difficultty;in 6 weeks  Patient will transfer: sit/stand;for car;for toileting;for in/out of bed;for in/out of chair;in 12 weeks;Partially met  Patient will work: without restrictions;in 6 weeks  Patient will return to: other activity;in 4 weeks  other activity: driving  Pt will: be able to drive in 4-6 weeks.    status: is driving now. Met    Plan / Patient Education:     Continue with initial plan of care.  Progress with home program as tolerated.   Plans to return to work on 18        Subjective:     Pain Ratin/10 the right knee is sore and  tight feeling  Pt states she doesn't use walker in the house  Reports times without pain  Sleeping is good  Drove myself today  Transfers are good  Medication 2x/day        Objective:   Right knee ROM  0-105 degrees   contniued  reformer 2 yellow bands and red   Double knee/single knee  Reviewed  bridging, sidelying  Hip ABD, and prone SLR   SLS with fniger tip support 5 sec veronique  Tandem stance   Tolerated new ex well     Treatment Today     TREATMENT MINUTES COMMENTS   Evaluation     Self-care/ Home management     Manual therapy  Induction, indirect, direct techniques utilized as appropriate for optimal tissue release.   MFR/SCS - (R) SAPH-LV, (R) POP-LV, (R) DFEM-LV,(R)  MSG-LV/JESSICA-LV/LIG-LV   Neuromuscular Re-education     Therapeutic Activity     Therapeutic Exercises 30 Exercises per flow sheet./gait training step through pattern     Gait training     Modality__________________                Total 30    Blank areas are intentional and mean the treatment did not include these items.       Rekha Forde  2/8/2018

## 2021-06-15 NOTE — PROGRESS NOTES
[facility] Daily Progress     Patient Name: Alla Shrestha  Date: 2018  Visit #: 6  PTA visit #:  4  Referral Diagnosis: Primary osteoarthritis of right knee  Referring provider: Justice Willingham PAC  Visit Diagnosis:     ICD-10-CM    1. Primary osteoarthritis of right knee M17.11    2. History of total right knee replacement Z96.651    3. Knee stiffness, right M25.661    4. Swelling of knee joint, right M25.461    5. Abnormality of gait R26.9          Assessment:     HEP/POC compliance is  good .  Patient demonstrates understanding/independence with home program.  Response to Intervention good  Patient is appropriate to continue with skilled physical therapy intervention, as indicated by initial plan of care.   Increased right knee flex     Goal Status:Ongoing  Pt. will demonstrate/verbalize independence in self-management of condition in : 6 weeks  Pt. will be independent with home exercise program in : 6 weeks  Pt. will be able to walk : 30 minutes;around the house;with less pain;with less difficulty;for household mobility;for community mobility;in 12 weeks  Comment:: without AD   status: able to ambulate at home without walker  Patient will stand : 30 minutes;with less pain;with less difficultty;for home chores;in 12 weeks  Patient will sit: 30 minutes;for driving;for work;with less pain;with less difficultty;in 6 weeks  Patient will transfer: sit/stand;for car;for toileting;for in/out of bed;for in/out of chair;in 12 weeks;Partially met  Patient will work: without restrictions;in 6 weeks  Patient will return to: other activity;in 4 weeks  other activity: driving  Pt will: be able to drive in 4-6 weeks.    status: is driving now. Met    Plan / Patient Education:     Continue with initial plan of care.  Progress with home program as tolerated.   Reformer, step ups and TKE with band  Reapply KT if helpful    Subjective:     Pain Ratin/10 the right knee is sore and tight feeling  Reports  doing some house hold chores, groceries shopping for and hour using walker yesterday  Sleeping is good   Continues to work with CPM, 30 min 2x/day. Set at 96 on right, 120 degrees on left      Objective:   Right knee ROM  0-102 degrees   contniued  reformer 2 yellow bands  Double knee presses   Added standing side steps, hip ABD, high marches, knee bends,TKE with OTB  Tolerated new ex well     Treatment Today     TREATMENT MINUTES COMMENTS   Evaluation     Self-care/ Home management     Manual therapy  Induction, indirect, direct techniques utilized as appropriate for optimal tissue release.   MFR/SCS - (R) SAPH-LV, (R) POP-LV, (R) DFEM-LV,(R)  MSG-LV/JESSICA-LV/LIG-LV   Neuromuscular Re-education     Therapeutic Activity     Therapeutic Exercises 30 Exercises per flow sheet./gait training step through pattern     Gait training     Modality__________________                Total 30    Blank areas are intentional and mean the treatment did not include these items.       Rekha Forde  1/30/2018

## 2021-06-15 NOTE — PROGRESS NOTES
[facility]   Knee Initial Evaluation    Patient Name: Alla Shrestha  Date of evaluation: 1/2/2018   Visit #1  Referral Diagnosis: Primary osteoarthritis of right knee  Referring provider: Justice Willingham, PAC  Visit Diagnosis:     ICD-10-CM    1. Primary osteoarthritis of right knee M17.11    2. History of total right knee replacement Z96.651    3. Knee stiffness, right M25.661    4. Swelling of knee joint, right M25.461    5. Abnormality of gait R26.9    6. Essential hypertension I10    7. Mixed hyperlipidemia E78.2    8. Mild intermittent asthma in adult without complication J45.20    9. CKD (chronic kidney disease) stage 4, GFR 15-29 ml/min N18.4        Assessment:       Alla Shrestha is a 47 y.o. female who presents to therapy today with chief complaints of (R) knee pain and stiffness. Onset date of sx was 12/20/17, s/p (R) TKA.  Functional impairments include walking, standing, sitting, driving, work, stairs.  Clinical findings include (R) knee effusion, decreased ROM, decreased quad recruitment, impaired gait with use of wheeled walker.     Pt. is appropriate for skilled PT intervention as outlined in the Plan of Care (POC).  Pt. is a good candidate for skilled PT services to improve pain levels and function.    Goals:  Pt. will demonstrate/verbalize independence in self-management of condition in : 6 weeks  Pt. will be independent with home exercise program in : 6 weeks  Pt. will be able to walk : 30 minutes;around the house;with less pain;with less difficulty;for household mobility;for community mobility;in 12 weeks  Comment:: without AD   status: able to ambulate at home without walker  Patient will stand : 30 minutes;with less pain;with less difficultty;for home chores;in 12 weeks  Patient will sit: 30 minutes;for driving;for work;with less pain;with less difficultty;in 6 weeks  Patient will transfer: sit/stand;for car;for toileting;for in/out of bed;for in/out of chair;in  12 weeks;Partially met  Patient will work: without restrictions;in 6 weeks  Patient will return to: other activity;in 4 weeks  other activity: driving  Pt will: be able to drive in 4-6 weeks.    status: is driving now. Met    Patient's expectations/goals are realistic.    Barriers to Learning or Achieving Goals:  No Barriers.       Plan / Patient Instructions:      Plan of Care:   Authorization / Certification Number of Visits: UMR  Communication with: Referral Source  Patient Related Instruction: Nature of Condition;Treatment plan and rationale;Self Care instruction;Basis of treatment;Next steps  Times per Week: 1-2  Number of Weeks: 6  Number of Visits: up to 12  Discharge Planning: home program, self management  Precautions / Restrictions : kidney disease, on warfarin  Therapeutic Exercise: ROM;Stretching;Strengthening  Neuromuscular Reeducation: balance/proprioception  Manual Therapy: myofascial release;strain counterstrain  Gait Training: with/without AD    Plan for next visit: progression of home program, manual therapy, gait training     Subjective:        Social information:   Living Situation:lives with others    Occupation:customer service   Work Status:On short term disability   Equipment Available: walker wheeled    History of Present Illness:    Alla is a 47 y.o. female who presents to therapy today with complaints of (R) knee pain and stiffness. Date of onset/duration of symptoms is 12/20/17 (R) TKA by Dr. Villanueva with [facility]. She was seen here previously s/p (L) TKA 11/17.. Onset was related to surgery. Knee pain has been chronic for about 10 years. Symptoms are constant and getting better. She reports  A chronic  history of similar symptoms. She describes their previous level of function as limited with daily activities due to pain.   She reports her symptoms have been improving for the last couple days, with less swelling and pain. She is having more pain compared to the first surgery.  She had several home care visits after surgery. She has been working on her exercises.   She is hoping to return to work in about 3 weeks.     Pain Ratin  Pain rating at best: 3  Pain rating at worst: 10  Pain description:pain    Functional limitations are described as occurring with:   driving  ascending and descending stairs or curbs  bending  performing routine daily activities  sitting 2 min   standing 5 min  walking 5 min    Patient reports benefit from:  rest  , elevation       Objective:      Note: Items left blank indicates the item was not performed or not indicated at the time of the evaluation.    Patient Outcome Measures :    Lower Extremity Functional Scale (_/80): 14   Scores range from 0-80, where a score of 80 represents maximum function. The minimal clinically important difference is a positive change of 9 points.    Knee Examination  1. Primary osteoarthritis of right knee     2. History of total right knee replacement     3. Knee stiffness, right     4. Swelling of knee joint, right     5. Abnormality of gait     6. Essential hypertension     7. Mixed hyperlipidemia     8. Mild intermittent asthma in adult without complication     9. CKD (chronic kidney disease) stage 4, GFR 15-29 ml/min       Precautions/Restrictions:  on warfarin  Involved Side: Right  Posture Observation:   Assistive Device: 2WW  Gait Observation: antalgic, limited (R) knee flexion, decreased stance time,   Observation: generalized effusion in knee/lower leg.  Steri strips intact. Incision appears closed. No drainage.    Knee ROM Within normal limits unless otherwise indicated     Date:  18    AROM in degrees  Right   Left  Right   Left  Right   Left       Knee Flexion  (130 )                         Knee Extension  (0 )                       PROM in degrees  Right   Left  Right   Left  Right   Left       Knee Flexion  (130 )   75 (+)                      Knee Extension  (0 )   4 (+)                    LE Strength     "Within normal limits unless otherwise indicated               Date:     Strength (MMT/5)  Right   Left  Right   Left  Right   Left       Hip Flexion                         Hip Abduction                         Hip Adduction                         Hip Extension                         Hip Internal Rotation                         Hip External Rotation                         Knee Extension   Quad recruitment fair  Needs mod assist for SLR flex with ext lag noted                      Knee Flexion                         Ankle Dorsiflexion                         Ankle Plantarflexion                         Circumferential measurements:  4\" above  (R) 61.4 cm  jt line  (R) 47.9 cm  3\" below (R) 44.6 cm    Palpation:  Mult tender pts med/lat patella, med/lat sup and inf femoral condyles, (R) medial hamstrings    Knee Special Tests (+/-):  NA today    Knee OA Cluster   Right   Left   Ligament Tests   Right   Left    1. > 49 y/o           Lachman          2. Stiffness > 30 min.           Anterior Drawer          3. Crepitus           Posterior Drawer          4. Bony tenderness           Posterior Sag          5. Bone enlargement           Valgus Stress          6. No warmth to the touch           Varus Stress           Meniscal Tests   Right   Left    Other   Right    Left       Mc's           Ely's             Joint line tenderness           Dioni             Thessaly Thomas Apley's                        Treatment Today     TREATMENT MINUTES COMMENTS   Evaluation 25    Self-care/ Home management     Manual therapy 15 Induction, indirect, direct techniques utilized as appropriate for optimal tissue release.   MFR/SCS - (R) SAPH-LV, (R) DFEM-LV, (R) MSUR-LV, (R) POP-LV, (R) LSG/LIG/MSG/JESSICA-LV,    Neuromuscular Re-education     Therapeutic Activity     Therapeutic Exercises 15 Plan of care and goals developed in collaboration with patient.   Discussed findings, anatomy, instructed in " exercises. Reviewed selected HEP from previous therapy.   Gait training     Modality__________________                Total 55    Blank areas are intentional and mean the treatment did not include these items.        PT Evaluation Code: (Please list factors)  Patient History/Comorbidities: kidney disease, HTN, hyperlipidemia, OA, (L) knee TKA 11/17  Examination: 2  Clinical Presentation: evolving  Clinical Decision Making: low    Patient History/  Comorbidities Examination  (body structures and functions, activity limitations, and/or participation restrictions) Clinical Presentation Clinical Decision Making (Complexity)   No documented Comorbidities or personal factors 1-2 Elements Stable and/or uncomplicated Low   1-2 documented comorbidities or personal factor 3 Elements Evolving clinical presentation with changing characteristics Moderate   3-4 documented comorbidities or personal factors 4 or more Unstable and unpredictable High                Nanette Adkins  1/2/2018  3:43 PM                  7

## 2021-06-15 NOTE — PROGRESS NOTES
[facility] Daily Progress     Patient Name: Alla Shrestha  Date: 1/5/2018  Visit #: 2  PTA visit #:  0  Referral Diagnosis: Primary osteoarthritis of right knee  Referring provider: Justice Willingham PAC  Visit Diagnosis:     ICD-10-CM    1. Primary osteoarthritis of right knee M17.11    2. History of total right knee replacement Z96.651    3. Knee stiffness, right M25.661    4. Swelling of knee joint, right M25.461    5. Abnormality of gait R26.9    6. Essential hypertension I10    7. Mixed hyperlipidemia E78.2    8. Mild intermittent asthma in adult without complication J45.20    9. CKD (chronic kidney disease) stage 4, GFR 15-29 ml/min N18.4          Assessment:     HEP/POC compliance is  good .  Patient demonstrates understanding/independence with home program.  Response to Intervention good  Patient is appropriate to continue with skilled physical therapy intervention, as indicated by initial plan of care.    Goal Status:  Pt. will demonstrate/verbalize independence in self-management of condition in : 6 weeks  Pt. will be independent with home exercise program in : 6 weeks  Pt. will be able to walk : 30 minutes;around the house;with less pain;with less difficulty;for household mobility;for community mobility;in 12 weeks  Comment:: without AD   status: able to ambulate at home without walker  Patient will stand : 30 minutes;with less pain;with less difficultty;for home chores;in 12 weeks  Patient will sit: 30 minutes;for driving;for work;with less pain;with less difficultty;in 6 weeks  Patient will transfer: sit/stand;for car;for toileting;for in/out of bed;for in/out of chair;in 12 weeks;Partially met  Patient will work: without restrictions;in 6 weeks  Patient will return to: other activity;in 4 weeks  other activity: driving  Pt will: be able to drive in 4-6 weeks.    status: is driving now. Met    Plan / Patient Education:     Continue with initial plan of care.  Progress with home program  as tolerated.    Subjective:     Pain Ratin  Slept pretty good last night. 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 Set at 80 deg. Has been walking in the apartment every 1/2 hour.  Will be seeing the ortho on Monday.       Objective:     Quad recruitment is fair and improving.   Needs mild/mod assist with SLR flex.   Tenderness of med/lat post knee.     Treatment Today     TREATMENT MINUTES COMMENTS   Evaluation     Self-care/ Home management     Manual therapy 10 Induction, indirect, direct techniques utilized as appropriate for optimal tissue release.   MFR/SCS - (R) SAPH-LV, (R) POP-LV, (R) DFEM-LV,(R)  MSG-LV/JESSICA-LV/LIG-LV   Neuromuscular Re-education     Therapeutic Activity     Therapeutic Exercises 20 Exercises per flow sheet.    Gait training     Modality__________________                Total 30    Blank areas are intentional and mean the treatment did not include these items.       Nanette Adkins  2018

## 2021-06-15 NOTE — PROGRESS NOTES
[facility] Daily Progress     Patient Name: Alla Shrestha  Date: 2018  Visit #: 7  PTA visit #:  5  Referral Diagnosis: Primary osteoarthritis of right knee  Referring provider: Justice Willingham PAC  Visit Diagnosis:     ICD-10-CM    1. Primary osteoarthritis of right knee M17.11    2. History of total right knee replacement Z96.651    3. Knee stiffness, right M25.661    4. Swelling of knee joint, right M25.461    5. Abnormality of gait R26.9          Assessment:     HEP/POC compliance is  good .  Patient demonstrates understanding/independence with home program.  Response to Intervention good  Patient is appropriate to continue with skilled physical therapy intervention, as indicated by initial plan of care.   Walking without AD inside the house    Goal Status:Ongoing  Pt. will demonstrate/verbalize independence in self-management of condition in : 6 weeks  Pt. will be independent with home exercise program in : 6 weeks  Pt. will be able to walk : 30 minutes;around the house;with less pain;with less difficulty;for household mobility;for community mobility;in 12 weeks  Comment:: without AD   status: able to ambulate at home without walker  Patient will stand : 30 minutes;with less pain;with less difficultty;for home chores;in 12 weeks  Patient will sit: 30 minutes;for driving;for work;with less pain;with less difficultty;in 6 weeks  Patient will transfer: sit/stand;for car;for toileting;for in/out of bed;for in/out of chair;in 12 weeks;Partially met  Patient will work: without restrictions;in 6 weeks  Patient will return to: other activity;in 4 weeks  other activity: driving  Pt will: be able to drive in 4-6 weeks.    status: is driving now. Met    Plan / Patient Education:     Continue with initial plan of care.  Progress with home program as tolerated.       Subjective:     Pain Ratin/10 the right knee is sore and tight feeling  Reports doing some house hold chores, groceries shopping for  and hour using walker yesterday  Sleeping is good   needed to turn in CPM today  Discussed getting home restorator at home      Objective:   Right knee ROM  0-102 degrees   contniued  reformer 2 yellow bands and blue   Double knee/single knee   reviewed standing side steps, hip ABD, high marches, knee bends,TKE with OTB  SLS with fniger tip support 5 sec veronique  Tolerated new ex well     Treatment Today     TREATMENT MINUTES COMMENTS   Evaluation     Self-care/ Home management     Manual therapy  Induction, indirect, direct techniques utilized as appropriate for optimal tissue release.   MFR/SCS - (R) SAPH-LV, (R) POP-LV, (R) DFEM-LV,(R)  MSG-LV/JESSICA-LV/LIG-LV   Neuromuscular Re-education     Therapeutic Activity     Therapeutic Exercises 30 Exercises per flow sheet./gait training step through pattern     Gait training     Modality__________________                Total 30    Blank areas are intentional and mean the treatment did not include these items.       Rekha Forde  2/1/2018

## 2021-06-16 NOTE — PROGRESS NOTES
[facility] Daily Progress     Patient Name: Alla Shrestha  Date: 2/15/2018  Visit #: 10/12  PTA visit #:  6+2  Referral Diagnosis: Primary osteoarthritis of right knee  Referring provider: Justice Willingham PAC  Visit Diagnosis:     ICD-10-CM    1. Primary osteoarthritis of right knee M17.11    2. History of total right knee replacement Z96.651    3. Knee stiffness, right M25.661    4. Swelling of knee joint, right M25.461    5. Abnormality of gait R26.9    6. Knee stiffness, left M25.662          Assessment:     HEP/POC compliance is  good .  Patient demonstrates understanding/independence with home program.  Response to Intervention good  Patient is appropriate to continue with skilled physical therapy intervention, as indicated by initial plan of care.   Improved step through gait pattern  Up stairs reciprocally    Goal Status:Ongoing  Pt. will demonstrate/verbalize independence in self-management of condition in : 6 weeks  Pt. will be independent with home exercise program in : 6 weeks  Pt. will be able to walk : 30 minutes;around the house;with less pain;with less difficulty;for household mobility;for community mobility;in 12 weeks  Comment:: without AD   status: able to ambulate at home without walker  Patient will stand : 30 minutes;with less pain;with less difficultty;for home chores;in 12 weeks  Patient will sit: 30 minutes;for driving;for work;with less pain;with less difficultty;in 6 weeks  Patient will transfer: sit/stand;for car;for toileting;for in/out of bed;for in/out of chair;in 12 weeks;Partially met  Patient will work: without restrictions;in 6 weeks  Patient will return to: other activity;in 4 weeks  other activity: driving  Pt will: be able to drive in 4-6 weeks.    status: is driving now. Met    Plan / Patient Education:     Continue with initial plan of care.  Progress with home program as tolerated.   Assess goals/outcomes        Subjective:     Pain Ratin/10 the right  knee is sore and tight feeling busy today and haevn't taken medications   Pt states she got OK to swim going in morning  Plan to return to work next week    Objective:   Stairs up reciprocally down nonreciprocally  contniued  reformer 2 yellow bands and red   Double knee/single knee  Ambulated into dept with AD  Right knee flex 108 seated  Added wall slides     Tolerated new ex well     Treatment Today     TREATMENT MINUTES COMMENTS   Evaluation     Self-care/ Home management     Manual therapy  Induction, indirect, direct techniques utilized as appropriate for optimal tissue release.   MFR/SCS - (R) SAPH-LV, (R) POP-LV, (R) DFEM-LV,(R)  MSG-LV/JESSICA-LV/LIG-LV   Neuromuscular Re-education     Therapeutic Activity     Therapeutic Exercises 30 Exercises per flow sheet./gait training step through pattern     Gait training     Modality__________________                Total 30    Blank areas are intentional and mean the treatment did not include these items.       Rekha Forde  2/15/2018

## 2021-06-16 NOTE — PROGRESS NOTES
[facility] Discharge Summary  Patient Name: Alla Shrestha  Date: 3/20/2018  Referral Diagnosis: Primary osteoarthritis of right knee  Referring provider: Justice Willingham PAC  Visit Diagnosis:   1. Primary osteoarthritis of right knee     2. History of total right knee replacement     3. Knee stiffness, right     4. Swelling of knee joint, right     5. Abnormality of gait     6. Essential hypertension     7. Mixed hyperlipidemia     8. Mild intermittent asthma in adult without complication     9. CKD (chronic kidney disease) stage 4, GFR 15-29 ml/min         Goals:  Pt. will demonstrate/verbalize independence in self-management of condition in : 6 weeks;Met  Pt. will be independent with home exercise program in : 6 weeks;Met  Pt. will be able to walk : 30 minutes;around the house;with less pain;with less difficulty;for household mobility;for community mobility;in 12 weeks;Met  Patient will stand : 30 minutes;with less pain;with less difficultty;for home chores;in 12 weeks;Partially met;Comment  Comment:: status: 20-30 min tolerance  Patient will sit: 30 minutes;for driving;for work;with less pain;with less difficultty;in 6 weeks;Met  Patient will work: without restrictions;in 6 weeks;Met  Patient will return to: in 4 weeks;other activity;Met  other activity: driving    No Data Recorded    Patient was seen for 12 visits (R) knee/7 visits (L) knee from 11/22/17 to 3/1/18 with 7 missed appointments.  The patient met goals and has demonstrated understanding of and independence in the home program for self-care, and progression to next steps.  She will initiate contact if questions or concerns arise.  The patient was instructed to follow up with physician's clinic.  Patient received a home program for ROM, strength  She has returned to work and is independent with home exercises.   No further therapy is required at this time.    Therapy will be discontinued at this time.  The patient will need a new  referral to [facility].    Thank you for your referral.  Nanette Adkins  3/20/2018  12:12 PM    [facility] Daily Progress     Patient Name: Alla Shrestha  Date: 3/1/2018  Visit #: 12/12  PTA visit #:  6+2  Referral Diagnosis: Primary osteoarthritis of right knee  Referring provider: Justice Willingham, PAC  Visit Diagnosis:     ICD-10-CM    1. Primary osteoarthritis of right knee M17.11    2. History of total right knee replacement Z96.651    3. Knee stiffness, right M25.661    4. Swelling of knee joint, right M25.461    5. Abnormality of gait R26.9    6. Essential hypertension I10    7. Mixed hyperlipidemia E78.2    8. Mild intermittent asthma in adult without complication J45.20    9. CKD (chronic kidney disease) stage 4, GFR 15-29 ml/min N18.4          Assessment:     HEP/POC compliance is  good .  Patient demonstrates understanding/independence with home program.  Response to Intervention good    Goal Status:  Pt. will demonstrate/verbalize independence in self-management of condition in : 6 weeks;Met  Pt. will be independent with home exercise program in : 6 weeks;Met  Pt. will be able to walk : 30 minutes;around the house;with less pain;with less difficulty;for household mobility;for community mobility;in 12 weeks;Met  Comment:: without AD   status: able to ambulate at home without walker  Patient will stand : 30 minutes;with less pain;with less difficultty;for home chores;in 12 weeks;Partially met;Comment  Comment:: status: 20-30 min tolerance  Patient will sit: 30 minutes;for driving;for work;with less pain;with less difficultty;in 6 weeks;Met  Patient will transfer: sit/stand;for car;for toileting;for in/out of bed;for in/out of chair;in 12 weeks;Partially met  Patient will work: without restrictions;in 6 weeks;Met  Patient will return to: in 4 weeks;other activity;Met  other activity: driving    Pt will: be able to drive in 4-6 weeks.    status: is driving now. Met    Plan / Patient Education:      Follow up with surgeon next week. Also following up with primary care tomorrow.   No additional visits scheduled at this time. Will hold until MD appointment.     Subjective:     Pain Ratin  Ordered some compression stockings today. Legs are still swelling during the day, L>R. She is working on technique on stairs. Overall, she feels she's doing pretty good. Still working on strength and mobility, understands it will take time.  Will start back at the gym next week.   See goal section for functional status.     Lower Extremity Functional Scale (_/80): 55      Objective:     Knee ROM:    Date: 3/1/18      Knee ROM ( ) AROM in degrees AROM in degrees AROM in degrees    Right Left Right Left Right Left   Knee Flexion (0-130 )         Knee extension (0 )          PROM in degrees PROM in degrees PROM in degrees    Right Left Right Left Right Left   Knee Flexion (0-130 ) 100 supine  103 seated        Knee extension (0 )               Treatment Today     TREATMENT MINUTES COMMENTS   Evaluation     Self-care/ Home management     Manual therapy     Neuromuscular Re-education     Therapeutic Activity     Therapeutic Exercises 30 Reassessed goals, functional status, objective measures, outcome measures.   Discussed using bike at the gym in addition to LE strengthening, pool.  Exercises per flow sheet.    Gait training     Modality__________________                Total 30    Blank areas are intentional and mean the treatment did not include these items.       Nanette Adkins  3/1/2018

## 2021-06-16 NOTE — PROGRESS NOTES
[facility] Daily Progress     Patient Name: Alla Shrestha  Date: 2018  Visit #: 10/12  PTA visit #:  6+2  Referral Diagnosis: Primary osteoarthritis of right knee  Referring provider: Justice Willingham, PAC  Visit Diagnosis:     ICD-10-CM    1. Primary osteoarthritis of right knee M17.11    2. History of total right knee replacement Z96.651    3. Knee stiffness, right M25.661    4. Swelling of knee joint, right M25.461    5. Abnormality of gait R26.9    6. Essential hypertension I10          Assessment:     HEP/POC compliance is  good .  Patient demonstrates understanding/independence with home program.  Response to Intervention good  Patient is appropriate to continue with skilled physical therapy intervention, as indicated by initial plan of care.   Improved step through gait pattern  Up stairs reciprocally    Goal Status:Ongoing  Pt. will demonstrate/verbalize independence in self-management of condition in : 6 weeks  Pt. will be independent with home exercise program in : 6 weeks  Pt. will be able to walk : 30 minutes;around the house;with less pain;with less difficulty;for household mobility;for community mobility;in 12 weeks  Comment:: without AD   status: able to ambulate at home without walker  Patient will stand : 30 minutes;with less pain;with less difficultty;for home chores;in 12 weeks  Patient will sit: 30 minutes;for driving;for work;with less pain;with less difficultty;in 6 weeks  Patient will transfer: sit/stand;for car;for toileting;for in/out of bed;for in/out of chair;in 12 weeks;Partially met  Patient will work: without restrictions;in 6 weeks  Patient will return to: other activity;in 4 weeks  other activity: driving  Pt will: be able to drive in 4-6 weeks.    status: is driving now. Met    Plan / Patient Education:     Continue with initial plan of care.  Progress with home program as tolerated.   Assess goals/outcomes        Subjective:     Pain Ratin/10 the right knee  is sore and tight feeling  Pt states she is waiting for MD to call back about going swimming  Plan to return to work next week          Objective:   Stairs up reciprocally down nonreciprocally  contniued  reformer 2 yellow bands and red   Double knee/single knee  Improved step through gait     Tolerated new ex well     Treatment Today     TREATMENT MINUTES COMMENTS   Evaluation     Self-care/ Home management     Manual therapy  Induction, indirect, direct techniques utilized as appropriate for optimal tissue release.   MFR/SCS - (R) SAPH-LV, (R) POP-LV, (R) DFEM-LV,(R)  MSG-LV/JESSICA-LV/LIG-LV   Neuromuscular Re-education     Therapeutic Activity     Therapeutic Exercises 30 Exercises per flow sheet./gait training step through pattern     Gait training     Modality__________________                Total 30    Blank areas are intentional and mean the treatment did not include these items.       Rekha Forde  2/13/2018

## 2021-06-17 NOTE — PATIENT INSTRUCTIONS - HE
Doxycycline 100 mg twice daily x10 days    Prednisone 20 mg daily for 4 days    Okay for low-dose Aleve

## 2021-06-17 NOTE — PROGRESS NOTES
Alla is a 50 y.o. female contacting the clinic today via video, who will use the platform: [employer] for the visit.  Phone # for [employer], or if Amwell drops:   Telephone Information:   Mobile [phone]       ASSESSMENT:  1. Acute maxillary sinusitis, recurrence not specified  Covid negative.  Sounds bacterial.  We will treat and also with prednisone for significant inflammatory component  - doxycycline (MONODOX) 100 MG capsule; Take 1 capsule (100 mg total) by mouth 2 (two) times a day for 10 days.  Dispense: 20 capsule; Refill: 0  - predniSONE (DELTASONE) 20 MG tablet; Take 20 mg by mouth daily for 4 days.  Dispense: 4 tablet; Refill: 0    2. ESRD needing dialysis (H)  Stable by dialysis readings.  Episode of peritonitis resolved    3. Mild intermittent asthma in adult without complication  Minimal except for cough    Preventive Health Care:      PLAN:  Patient Instructions   Doxycycline 100 mg twice daily x10 days    Prednisone 20 mg daily for 4 days    Okay for low-dose Aleve    Return in about 3 months (around 8/24/2021) for a phone/video follow up visit.      CHIEF COMPLAINT:  Chief Complaint   Patient presents with     URI     productive cough; sneezing; nasal drainage; ear pain       HISTORY OF PRESENT ILLNESS:  Alla is a 50 y.o. female contacting the clinic today via video for complaints of sinusitis.  Symptoms have been going on for 7 or 8 days.  She complains of sinus maxillary congestion worse in the mornings with profuse mucus production.  She has drainage down the back of her throat and green drainage out the front.  Cough is slightly worse.  Over-the-counter Benadryl and Allegra have been ineffective.  Covid swab has been negative x2    She has end-stage renal disease and goes to dialysis 3 times weekly.  Dialysis numbers have been stable.  There have been no fever.  She has never been formally diagnosed with MRSA    REVIEW OF SYSTEMS:   Otherwise feels quite well    PFSH:  Social History      Social History Narrative    Patient lives with her boyfriend.  She has a grown son and a dog.  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        Goes to dialysis mon, wed, Friday           TOBACCO USE:  Social History     Tobacco Use   Smoking Status Current Every Day Smoker     Packs/day: 0.30     Years: 20.00     Pack years: 6.00     Types: Cigarettes   Smokeless Tobacco Never Used       VITALS:  There were no vitals filed for this visit.  Wt Readings from Last 3 Encounters:   07/13/20 (!) 264 lb 1.6 oz (119.8 kg)   07/11/20 (!) 266 lb 1.6 oz (120.7 kg)   04/10/19 (!) 244 lb (110.7 kg)       PHYSICAL EXAM:  (observations via Video)  Pleasant middle-aged black woman coughing occasionally and sounding congested    MEDICATIONS:   Current Outpatient Medications   Medication Sig Dispense Refill     acetaminophen (TYLENOL) 500 MG tablet Take 500 mg by mouth every 6 (six) hours as needed.       albuterol (PROAIR HFA;PROVENTIL HFA;VENTOLIN HFA) 90 mcg/actuation inhaler Inhale 2 puffs every 6 (six) hours as needed for wheezing. 1 each 1     aspirin 81 MG EC tablet Take 1 tablet (81 mg total) by mouth daily. Resume taking 7/17/20  0     atorvastatin (LIPITOR) 40 MG tablet Take 1 tablet (40 mg total) by mouth daily. 30 tablet 11     clotrimazole (LOTRIMIN) 1 % cream Apply to feet twice a day for 3 weeks and then as needed 113 g 2     ketoconazole (NIZORAL) 2 % cream Apply to face 2ce a day for 3 weeks and then as needed 30 g 1     traZODone (DESYREL) 50 MG tablet Take 1 tablet (50 mg total) by mouth at bedtime as needed for sleep. 90 tablet 1     varenicline (CHANTIX) 0.5 MG tablet One tab by mouth daily. 30 tablet 2     VELPHORO 500 mg Chew chewable tablet 1,000 mg 5 (five) times a day. Take with meals and snacks.       doxycycline (MONODOX) 100 MG capsule Take 1 capsule (100 mg total) by mouth 2 (two) times a day for 10 days. 20 capsule 0     predniSONE (DELTASONE) 20 MG tablet Take 20 mg by mouth daily for 4  days. 4 tablet 0     No current facility-administered medications for this visit.        Outside Notes summarized:   Mychart messages x ,  Interim orders x   Labs, x-rays, cardiology, GI tests reviewed:   New orders: No orders of the defined types were placed in this encounter.      Independent review of:    Supplemental history by:      Video Start Time: 3:12 PM  Video End time:  3:46 PM  Face to face plus orders: 34 minutes  Documentation time: 3 minutes    The visit lasted a total of 37 minutes     Patient has given verbal consent to a Video visit?  Yes  How would you like to obtain your AVS?  MyChart  Will anyone else be joining your video visit? No       Video-Visit Details  Type of service:  Video Visit  Originating Location (pt. Location): Home  Distant Location (provider location):   [facility]     Ayo Enriquez MD

## 2021-06-18 NOTE — PROGRESS NOTES
[facility] Note    Alla Shrestha   47 y.o. female    Date of Visit: 5/15/2018  Chief Complaint   Patient presents with     Wheezing     X three days of SOB with Wheezing       ASSESSMENT/PLAN  1. Community acquired pneumonia of right lung, unspecified part of lung     2. Cough  XR Chest 2 Views   3. CHF (congestive heart failure)  BNP(B-type Natriuretic Peptide)    Basic Metabolic Panel    HM2(CBC w/o Differential)     ---------------------------------------------    Clinical picture seems more consistent with community-acquired pneumonia, productive cough, shortness of breath, wheezing.  She seems somewhat surprised at the suggestion she might of asthma, and does not recall formal testing for this.  Looking back in the notes, it sounds like she was treated empirically for it.  Albuterol inhaler was not helping.  Complicating the picture is the fact that about 3 weeks ago, she weighed 233 pounds, and about 4 days ago weighed 243 pounds (240 pounds today).  Overall, she continues to lose weight and efforts to improve her candidacy for renal transplant.  Last December she was 264 pounds.  Chest x-ray demonstrates more of a pulmonary venous congestion picture, she does have mild leg swelling, but tells me this is baseline.  She does not have clear-cut orthopnea.  --Renal dose levofloxacin  --She has not been taking Bumex, so I recommended 1 mg twice a day for 3 days, clinic follow-up at that time  --Recheck kidney function today, hemoglobin  --Check BNP, which might help clarify the picture.  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  I did let her know that if she has increasing shortness of breath she needs to call 911.  --Blood pressure elevated today, but she notes that she vomited up her pills from coughing so hard  --BMP which came back overnight demonstrates marked worsening of renal function, BNP is severely elevated, pulmonary edema on x-ray consistent with volume overload, which now appears clear to be the reason for her symptoms.  I discussed with Dr. Capellan at [facility], we both agree that she needs to be admitted to the hospital for hemodialysis for volume removal.  She has a working fistula which has been there for years. She has been contacted and will go to the ER.  I called ahead to [facility] but could not get through      Return in about 3 days (around 5/18/2018).      SUBJECTIVE  Alla Shrestha is a 47-year-old woman with history of membranous proliferative glomerulonephritis and severe anemia related to chronic kidney disease, who is here for shortness of breath, wheezing.  This has been going on for the last 3 days.  She had a cold illness a couple weeks ago, seemed to improve, but got worse.  Now she is coughing up thick phlegm for the last couple of days, described as yellow.  She has had difficulty sleeping because of shortness of breath.  She does not note any orthopnea or increased lower extremity edema.  She is NyQuil for control of symptoms.  She used her albuterol inhaler, but it did not help.    She has history of total knee arthroplasty.  She also goes to Dr. Smith for Procrit shots and a nephrologist Dr. Capellan to follow the kidney disease.  Hemoglobin notably 5.5 back on April 4.  She was just in last week and it was up in the 8 range after a blood transfusion earlier and the Procrit shots.    Blood pressure elevated today.  Of note, she coughed up her blood pressure medications because she was coughing so hard.    ANJUM NICK  comprehensive review of systems was performed and was otherwise negative    Medications, allergies, and problem list were reviewed and updated    Patient Active Problem List   Diagnosis     Knee osteoarthritis     CKD (chronic kidney disease) stage 4, GFR 15-29 ml/min     Anemia, chronic renal failure, stage 4 (severe)     Essential hypertension     Mixed hyperlipidemia     Mild intermittent asthma in adult without complication     S/P total knee arthroplasty, right     Past Medical History:   Diagnosis Date     Anemia     has received iron infusions     Arthritis      Chronic kidney disease     Stage 4     Gout      Hay fever      HTN (hypertension)      Hyperlipidemia      Low hemoglobin      Mild intermittent asthma     pt. denies, but is listed by      Past Surgical History:   Procedure Laterality Date     HYSTERECTOMY  1999    fibroids     OOPHORECTOMY      listed by  /pt. thought they left her ovaries when she had hyst at young age     [place] TOTAL KNEE ARTHROPLASTY  11/15/2017    Procedure: LEFT TOTAL KNEE ARTHROPLASTY;  Surgeon: Kyle Villanueva MD;  Location: [facility];  Service: Orthopedics     [place] TOTAL KNEE ARTHROPLASTY Right 12/20/2017    Procedure: RIGHT TOTAL KNEE ARTHROPLASTY;  Surgeon: Kyle Villanueva MD;  Location: [facility];  Service: Orthopedics     RENAL BIOPSY  2010     Social History     Social History     Marital status: Single     Spouse name: N/A     Number of children: N/A     Years of education: N/A     Occupational History      [employer]     Social History Main Topics     Smoking status: Current Every Day Smoker     Packs/day: 0.30     Years: 20.00     Types: Cigarettes     Smokeless tobacco: Never Used     Alcohol use Yes      Comment: rare     Drug use: No     Sexual activity: Yes     Partners: Male     Other Topics Concern     Not on file     Social History Narrative    Patient lives with her boyfriend.  She has a grown son and a  dog.  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     Family History   Problem Relation Age of Onset     COPD Mother      Lung cancer Mother      Heart disease Father      Hypertension Father      Heart attack Father      COPD Brother      Hypertension Son      Stroke Maternal Grandmother      Heart disease Maternal Grandfather      Heart attack Maternal Grandfather      Cancer Paternal Grandmother      Clotting disorder Paternal Grandfather      Cancer Paternal Grandfather      Hypertension Paternal Grandfather        Current Outpatient Prescriptions   Medication Sig Dispense Refill     acetaminophen (TYLENOL) 500 MG tablet Take 2 tablets (1,000 mg total) by mouth 3 (three) times a day.  0     albuterol (PROAIR HFA;PROVENTIL HFA;VENTOLIN HFA) 90 mcg/actuation inhaler Inhale 2 puffs every 6 (six) hours as needed for wheezing. 1 each 0     atorvastatin (LIPITOR) 40 MG tablet Take 1 tablet (40 mg total) by mouth daily. 30 tablet 11     bumetanide (BUMEX) 1 MG tablet Take 1 tablet (1 mg total) by mouth 3 (three) times a day as needed. As needed for fluid retention in lower extremeties 60 tablet 0     calcitriol (ROCALTROL) 0.25 MCG capsule Take 1 capsule (0.25 mcg total) by mouth daily. 30 capsule 0     metoprolol tartrate (LOPRESSOR) 25 MG tablet Take 1 tablet (25 mg total) by mouth 2 (two) times a day.  0     nicotine polacrilex (NICORETTE) 4 MG gum Apply 1 each (4 mg total) to the mouth or throat as needed for smoking cessation. 100 each 2     senna-docusate (SENNOSIDES-DOCUSATE SODIUM) 8.6-50 mg tablet Take 1 tablet by mouth daily.       sodium bicarbonate 650 MG tablet Take 648 mg by mouth 2 (two) times a day.       traZODone (DESYREL) 50 MG tablet Take 50 mg by mouth at bedtime as needed for sleep.       levoFLOXacin (LEVAQUIN) 500 MG tablet Take 1 tablet (500 mg total) by mouth every other day for 7 days. 3 tablet 0     No current facility-administered medications for this visit.        Allergies    Allergen Reactions     Penicillins Hives       EXAM  Vitals:    05/15/18 1400 05/15/18 1418   BP: (!) 148/98 (!) 166/110   Patient Site: Left Arm    Patient Position: Sitting    Cuff Size: Adult Regular    Pulse: 78    SpO2: 98%    Weight: (!) 240 lb (108.9 kg)          General: alert, no distress  HEENT: sclerae anicteric, moist oral mucosa  Heart: Regular rate and rhythm, no murmurs.  Trace pretibial edema on the symmetric.  Warm extremities  Lungs: Coarse breath sounds throughout, crackles more apparent in right lower lung are nonlabored breathing.  Gastrointestinal: abdomen is  non-distended.    Skin: warm/dry, no rashes  Neuro: no gross abnormalities      RESULTS REVIEWED:     ANALYSIS AND SUMMARY OF OLD RECORDS, NOTES AND CONSULTS (2): Reviewed oncology note from April 5, 2018, she had coarse lung sounds at the bases pretransfusion that day, transfuse 1 unit packed red blood cells.Reviewed [facility] [place] note, she does have a fistula ready to go for dialysis when needed.  She has a history of metabolic acidosis.  At one point in time, she was listed on the transplant list, but the status of this was a little bit up in the air.  This note was from April 4, 2018.  Creatinine back in February was 5.57, hemoglobin 9.0.  Reviewed [place] [facility] note from April 11, discussed getting Procrit injections.    RECORDS REQUESTED (1): Care everywhere accessed, no recent [facility] notes from transplant team.     OTHER HISTORY SUMMARIZED (from nursing staff, family, friends) (2):     RADIOLOGY TESTS SUMMARIZED or REQUESTED (XRAY/CT/MRI/DXA) (1):   Xr Chest 2 Views    Result Date: 5/15/2018  XR CHEST 2 VIEWS 5/15/2018 2:35 PM INDICATION: Cough, RLL crackles, eval for PNA. COMPARISON: 10/23/2017 FINDINGS: There is new mild cardiomegaly, central hilar congestion and diffuse interstitial prominence most suggestive of developing CHF. No focal pneumonia. NOTE: ABNORMAL REPORT THE DICTATION  ABOVE DESCRIBES AN ABNORMALITY FOR WHICH FOLLOW-UP IS NEEDED.       MEDICINE TESTS SUMMARIZED or REQUESTED (EKG/ECHO/COLONOSCOPY/EGD) (1): None    INDEPENDENT REVIEW OF EKG OR X-RAY (2): Chest x-ray PA and lateral performed today, and in comparison to the films on October 23, 2017, there is increased pulmonary congestion versus infiltrate in the right hilum, lateral projection consistent with fluid in the fissure.  No pleural effusion.    Data points  7    Recent Results (from the past 240 hour(s))   HM2(CBC w/o Differential)   Result Value Ref Range    WBC 8.5 4.0 - 11.0 thou/uL    RBC 2.74 (L) 3.80 - 5.40 mill/uL    Hemoglobin 9.1 (L) 12.0 - 16.0 g/dL    Hematocrit 28.2 (L) 35.0 - 47.0 %     (H) 80 - 100 fL    MCH 33.2 27.0 - 34.0 pg    MCHC 32.3 32.0 - 36.0 g/dL    RDW 11.9 11.0 - 14.5 %    Platelets 187 140 - 440 thou/uL    MPV 8.1 7.0 - 10.0 fL         Allan Waldrop DO  Internal Medicine  [facility]

## 2021-06-19 NOTE — PROGRESS NOTES
[facility] Follow Up Note    Assessment/Plan:    1. Vaginal discharge  Wet prep was positive for clue cells and trichomonas.  We will start her on Flagyl.  Discussed to take morning dose after her morning dialysis.  Discussed that the partner should be treated as well but he should contact PCP for that since I do not know his medical history.  Will do rest of STD screening today.  - Wet Prep, Vaginal  - metroNIDAZOLE (FLAGYL) 500 MG tablet; Take 1 tablet (500 mg total) by mouth 2 (two) times a day for 7 days. Take after dialysis on the days of dialysis.  Dispense: 14 tablet; Refill: 0  - Chlamydia trachomatis & Neisseria gonorrhoeae, Amplified Detection  - Hepatitis B Surface Antigen (HBsAG)  - Hepatitis C Antibody (Anti-HCV)  - HIV Antigen/Antibody Screening [place]  - Syphilis Screen, [place]    2. Mitral valve disorder  On recent echocardiogram patient did have advanced diastolic dysfunction and moderate to severe mitral regurgitation.  Currently she is on hemodialysis and physically active and denies any symptoms.  Since valve deterioration can happen faster on hemodialysis and she is planning to be on a transplant list I recommended that she sees cardiology to further determine best treatment for her valve disease.  - Ambulatory referral to Cardiology    3. ESRD (end stage renal disease) (H)  Doing well on hemodialysis, she is currently off her blood pressure medications, she is staying physically active and is plugged in with solid organ transplant team.  - Pneumococcal polysaccharide vaccine 23-valent 1 yo or older, subq/IM    4. Need for vaccination  - Pneumococcal polysaccharide vaccine 23-valent 1 yo or older, subq/IM    5. Encounter for smoking cessation counseling  Patient is interested in Chantix.  Denies depression.  Discussed possible side effects.  Will renally dose it.  - varenicline (CHANTIX) 0.5 MG tablet; Take 1 tablet (0.5 mg total) by mouth daily.  Dispense: 30 tablet; Refill:  3    6. Cervical cancer screening  Patient has partial hysterectomy.  It is hard to see on exam whether or not soft tissue is cervix.  I did obtain Pap smear so we will see if there is a transition zone in there.  She had hysterectomy done because of the fibroids, no history of cancer.  - Gynecologic Cytology (PAP Smear)    Betty Vazquez MD    Chief Complaint:  Chief Complaint   Patient presents with     Vaginal Discharge       History of Present Illness:  Alla is a 47 y.o. female with history of end stage kidney disease/on HD,  hypertension , anemia, history of fibroids and partial hysterectomy and , obesity, gout,bl knee OA and smoking who is currently here for acute visit due to new onset vaginal discharge.    I last saw patient in the very beginning of this year when she had a preop done for bilateral knee replacement.  Since then a lot of different things happen.  She kidney disease has progressed and she is currently on hemodialysis but she is doing very well: She tells me that she came off her blood pressure medications, her swelling is better and she has no more shortness of breath.  Her knee replacements also working very well and she is able to exercise regularly.  She is back to work.  She has been seeing hematologist ( Dr Smith)and getting IV iron and Aranesp injections and hemoglobin levels is much better at around 11 (in the past it was difficult to keep it above 8).  She was seen by transplant team and is currently getting onto a renal transplant list.  She also had echocardiogram done which showed advanced diastolic dysfunction and moderately to severe mitral regurgitation.  She has not followed up with the cardiologist yet and I recommended that she does.  She has no history of symptoms with exertion however hemodialysis can make bowel disease progression faster.    Vaginal discharge started yesterday.  She reports brownish color.  No smell to it.  She did have mild itchiness but no pain or  elías blood.  She has not had any recent Pap smear done.  20 years ago she had partial hysterectomy but is not aware whether cervix was left in on not.  Her ovaries are still intact.  She is intermittently sexually active but not very often.  She opted in for STD screening today.    Patient also continues to smoke 5-10 cigarettes a day but would like to quit.  Discussed Chantix.  Because of her dialysis status she would only be taking 0.5 mg once a day.  Patient is motivated to quit smoking.    Review of Systems:  A comprehensive review of systems was performed and was otherwise negative    PFSH:  Social History: Reviewed  History   Smoking Status     Current Every Day Smoker     Packs/day: 0.30     Years: 20.00     Types: Cigarettes   Smokeless Tobacco     Never Used     Social History     Social History Narrative    Patient lives with her boyfriend.  She has a grown son and a dog.  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       Past History: Reviewed  Current Outpatient Prescriptions   Medication Sig Dispense Refill     acetaminophen (TYLENOL) 500 MG tablet Take 500 mg by mouth every 6 (six) hours as needed.       albuterol (PROAIR HFA;PROVENTIL HFA;VENTOLIN HFA) 90 mcg/actuation inhaler Inhale 2 puffs every 6 (six) hours as needed for wheezing. 1 each 0     aspirin 81 MG EC tablet Take 81 mg by mouth daily.       atorvastatin (LIPITOR) 40 MG tablet Take 1 tablet (40 mg total) by mouth daily. 30 tablet 11     bumetanide (BUMEX) 1 MG tablet Take 1 tablet (1 mg total) by mouth 2 (two) times a day at 9am and 6pm. 60 tablet 0     calcitriol (ROCALTROL) 0.25 MCG capsule Take 1 capsule (0.25 mcg total) by mouth daily. 30 capsule 0     calcium acetate (PHOSLO) 667 mg capsule Take 667 mg by mouth 3 (three) times a day with meals.       metroNIDAZOLE (FLAGYL) 500 MG tablet Take 1 tablet (500 mg total) by mouth 2 (two) times a day for 7 days. Take after dialysis on the days of dialysis. 14 tablet 0     traZODone  "(DESYREL) 50 MG tablet Take 50 mg by mouth at bedtime as needed for sleep.       varenicline (CHANTIX) 0.5 MG tablet Take 1 tablet (0.5 mg total) by mouth daily. 30 tablet 3     No current facility-administered medications for this visit.        Family History: Reviewed    Physical Exam:    Vitals:    08/03/18 1530   BP: 136/78   Patient Site: Left Arm   Patient Position: Sitting   Cuff Size: Adult Regular   Pulse: 88   Resp: 16   SpO2: 96%   Weight: (!) 231 lb 3 oz (104.9 kg)   Height: 5' 7\" (1.702 m)     Wt Readings from Last 3 Encounters:   08/03/18 (!) 231 lb 3 oz (104.9 kg)   06/24/18 (!) 242 lb 15.2 oz (110.2 kg)   05/16/18 (!) 236 lb 1.6 oz (107.1 kg)     Body mass index is 36.21 kg/(m^2).    Constitutional:  Reveals a pleasant female, in good spirits.  Vitals:  Per nursing notes.  HEENT:No cervical LAD, no thyromegaly,  conjunctiva is pink, no scleral icterus, TMs are visualized and normal bl, oropharynx is clear, no exudates,   Cardiac:  Regular rate and rhythm, mild mid systolic ejection murmur noted in the right and left sternal border.  Legs with 1+ edema. Palpation of the radial pulse regular.  Lungs: Clear to auscultation bl.  Respiratory effort normal.  No wheezes or rales  Abdomen:positive BS, soft, nontender, nondistended.  No hepato-splenomagaly  Skin:   Without rash, bruise, or palpable lesions.  Rheumatologic: Normal joints and nails of the hands.  Well-healed scars from her knee replacements bilaterally.  Neurologic:  Cranial nerves II-XII intact.     Psychiatric: affect is bright, memory intact.   Gyn: normal external genitalia, thin white mucus in vagina, evidence of previous surgery, ? Cervix /hard to .Pap and vaginal cultures obtained. No masses/pain on bimanual exam.      Data Review:    Analysis and Summary of Old Records (2): Yes including care everywhere    Records Requested (1): No    Other History Summarized (from other people in the room) (2): No    Radiology Tests Summarized " (XRAY/CT/MRI/DXA) (1): No    Labs Reviewed (1): Yes    Medicine Tests Reviewed (EKG/ECHO/COLONOSCOPY/EGD) (1): Yes echocardiogram    Independent Review of EKG or X-RAY (2): No

## 2021-07-03 NOTE — ADDENDUM NOTE
Addendum Note by Ulises Vazquez MD at 7/31/2017  6:27 PM     Author: Ulises Vazquez MD Service: -- Author Type: Physician    Filed: 7/31/2017  6:27 PM Encounter Date: 7/31/2017 Status: Signed    : Ulises Vazquez MD (Physician)    Addended by: ULISES VAZQUEZ on: 7/31/2017 06:27 PM        Modules accepted: Orders

## 2021-07-03 NOTE — ADDENDUM NOTE
Addendum Note by Ulises Vazquez MD at 8/9/2018  2:10 PM     Author: Ulises Vazquez MD Service: -- Author Type: Physician    Filed: 8/9/2018  2:10 PM Encounter Date: 8/6/2018 Status: Signed    : Ulises Vazquez MD (Physician)    Addended by: ULISES VAZQUEZ on: 8/9/2018 02:10 PM        Modules accepted: Orders

## 2021-07-04 NOTE — ADDENDUM NOTE
Addendum Note by Judit Dietz at 1/27/2021  8:10 AM     Author: Judit Dietz Service: -- Author Type: --    Filed: 2/19/2021 12:37 PM Encounter Date: 1/27/2021 Status: Signed    : Judit Dietz    Addended by: JUDIT DIETZ on: 2/19/2021 12:37 PM        Modules accepted: Orders

## 2021-07-06 ASSESSMENT — PATIENT HEALTH QUESTIONNAIRE - PHQ9: SUM OF ALL RESPONSES TO PHQ QUESTIONS 1-9: 0

## 2021-07-07 ENCOUNTER — COMMUNICATION - HEALTHEAST (OUTPATIENT)
Dept: INTERNAL MEDICINE | Facility: CLINIC | Age: 51
End: 2021-07-07

## 2021-07-07 NOTE — TELEPHONE ENCOUNTER
Telephone Encounter by Jaycob Goyal at 7/7/2021  5:21 PM     Author: Jaycob Goyal Service: -- Author Type: Patient Access    Filed: 7/7/2021  5:24 PM Encounter Date: 7/7/2021 Status: Signed    : Jaycob Goyal (Patient Access)       Cologuard Screening    1) Did patient receive their cologuard kit in the mail?       A) if YES: Please indicate that patient needs to complete testing ASAP and send back so we can receive their test results.    B) if NO: Would the patient be interested in performing test and would they like a new kit?       Reason/Additional information:       Please route telephone encounter documentation to me if patient calls back with answers.

## 2021-07-08 ASSESSMENT — ASTHMA QUESTIONNAIRES: ACT_TOTALSCORE: 25

## 2021-07-14 PROBLEM — N18.6 ESRD (END STAGE RENAL DISEASE) (H): Status: RESOLVED | Noted: 2018-06-23 | Resolved: 2021-01-27

## 2021-07-14 PROBLEM — N18.4 CKD (CHRONIC KIDNEY DISEASE) STAGE 4, GFR 15-29 ML/MIN (H): Status: RESOLVED | Noted: 2017-11-15 | Resolved: 2021-01-27

## 2021-07-14 PROBLEM — D63.1: Status: RESOLVED | Noted: 2017-11-15 | Resolved: 2021-01-27

## 2021-07-14 PROBLEM — N18.4: Status: RESOLVED | Noted: 2017-11-15 | Resolved: 2021-01-27

## 2021-09-25 ENCOUNTER — HEALTH MAINTENANCE LETTER (OUTPATIENT)
Age: 51
End: 2021-09-25

## 2021-10-22 NOTE — TELEPHONE ENCOUNTER
Reviewed chart. Noted clinic note from Dr. PHILL Capellan 04- stating pt's wt is 242 pounds and BMI is 38 and that she wanted to re-refer patient for kidney transplant. I called pt today and LM on cell phone. Home phone does not accept voicemails.   Pt returned my call today and I spoke to her at length. I reviewed the reason for my call is Dr. Capellan has re-referred her and BMI is down to 38. Pt confirmed this info and added the reason [facility] was because she needed to have both of her knees replaced. Pt stating she has successfully recovered from both surgeries. Pt very interested in [facility] - I explained can make this appt with pre kidney evaluation appts. Explained next step is for her to speak with Chery KANG LPN now for completion of our Intake Registration Process, after which I will call her in a week or so to plan for pre K eval and [facility]. Pt expressed very good understanding of all and was in good agreement with the plan.     Transferred call to Chery GAXIOLA    Tetracycline Counseling: Patient counseled regarding possible photosensitivity and increased risk for sunburn.  Patient instructed to avoid sunlight, if possible.  When exposed to sunlight, patients should wear protective clothing, sunglasses, and sunscreen.  The patient was instructed to call the office immediately if the following severe adverse effects occur:  hearing changes, easy bruising/bleeding, severe headache, or vision changes.  The patient verbalized understanding of the proper use and possible adverse effects of tetracycline.  All of the patient's questions and concerns were addressed. Patient understands to avoid pregnancy while on therapy due to potential birth defects.

## 2021-11-17 DIAGNOSIS — G47.00 INSOMNIA, UNSPECIFIED TYPE: Primary | ICD-10-CM

## 2021-11-19 RX ORDER — TRAZODONE HYDROCHLORIDE 50 MG/1
TABLET, FILM COATED ORAL
Qty: 90 TABLET | Refills: 0 | Status: SHIPPED | OUTPATIENT
Start: 2021-11-19 | End: 2022-03-03

## 2021-11-19 NOTE — TELEPHONE ENCOUNTER
"  Disp Refills Start End VERNELL    traZODone (DESYREL) 50 MG tablet 90 tablet 1 3/25/2020  No   Sig - Route: Take 1 tablet (50 mg total) by mouth at bedtime as needed for sleep. - Oral   Sent to pharmacy as: traZODone 50 mg tablet (DESYREL)   E-Prescribing Status: Receipt confirmed by pharmacy (3/25/2020 10:03 AM CDT)       traZODone (DESYREL) 50 MG tablet [[id number]]    Electronically signed by: Betty Vazquez MD on 03/25/20 1003 Status: Active   Ordering user: Betty Vazquez MD 03/25/20 1003 Authorized by: Betty Vazquez MD   PRN reasons: sleep   Frequency: Bedtime PRN 03/25/20 - Until Discontinued Released by: Betty Vazquez MD 03/25/20 1003   Diagnoses  Insomnia, unspecified type [G47.00]     Routing refill request to provider for review/approval because:  A break in medication     Last office visit provider:  5/24/21     Requested Prescriptions   Pending Prescriptions Disp Refills     traZODone (DESYREL) 50 MG tablet [Pharmacy Med Name: traZODone HCl 50 MG Oral Tablet] 90 tablet 0     Sig: TAKE 1 TABLET BY MOUTH AT BEDTIME AS NEEDED FOR SLEEP       Serotonin Modulators Passed - 11/17/2021  5:50 PM        Passed - Recent (12 mo) or future (30 days) visit within the authorizing provider's specialty     Patient has had an office visit with the authorizing provider or a provider within the authorizing providers department within the previous 12 mos or has a future within next 30 days. See \"Patient Info\" tab in inbasket, or \"Choose Columns\" in Meds & Orders section of the refill encounter.              Passed - Medication is active on med list        Passed - Patient is age 18 or older        Passed - No active pregnancy on record        Passed - No positive pregnancy test in past 12 months             Dillon Rodgers RN 11/19/21 2:13 PM  "

## 2021-12-21 ENCOUNTER — TRANSFERRED RECORDS (OUTPATIENT)
Dept: MULTI SPECIALTY CLINIC | Facility: CLINIC | Age: 51
End: 2021-12-21
Payer: COMMERCIAL

## 2021-12-23 LAB — COLOGUARD-ABSTRACT: NEGATIVE

## 2022-01-15 ENCOUNTER — HEALTH MAINTENANCE LETTER (OUTPATIENT)
Age: 52
End: 2022-01-15

## 2022-02-24 ASSESSMENT — ACTIVITIES OF DAILY LIVING (ADL): CURRENT_FUNCTION: NO ASSISTANCE NEEDED

## 2022-03-03 ENCOUNTER — TELEPHONE (OUTPATIENT)
Dept: INTERNAL MEDICINE | Facility: CLINIC | Age: 52
End: 2022-03-03

## 2022-03-03 ENCOUNTER — OFFICE VISIT (OUTPATIENT)
Dept: INTERNAL MEDICINE | Facility: CLINIC | Age: 52
End: 2022-03-03
Payer: MEDICARE

## 2022-03-03 ENCOUNTER — NURSE TRIAGE (OUTPATIENT)
Dept: NURSING | Facility: CLINIC | Age: 52
End: 2022-03-03

## 2022-03-03 VITALS
DIASTOLIC BLOOD PRESSURE: 72 MMHG | WEIGHT: 251 LBS | SYSTOLIC BLOOD PRESSURE: 116 MMHG | OXYGEN SATURATION: 90 % | HEART RATE: 110 BPM | BODY MASS INDEX: 39.31 KG/M2

## 2022-03-03 DIAGNOSIS — R94.39 ABNORMAL STRESS TEST: ICD-10-CM

## 2022-03-03 DIAGNOSIS — N89.8 VAGINAL DISCHARGE: ICD-10-CM

## 2022-03-03 DIAGNOSIS — Z11.3 SCREEN FOR STD (SEXUALLY TRANSMITTED DISEASE): ICD-10-CM

## 2022-03-03 DIAGNOSIS — Z99.2 ESRD NEEDING DIALYSIS (H): ICD-10-CM

## 2022-03-03 DIAGNOSIS — Z00.00 ANNUAL PHYSICAL EXAM: Primary | ICD-10-CM

## 2022-03-03 DIAGNOSIS — N25.81 SECONDARY RENAL HYPERPARATHYROIDISM (H): ICD-10-CM

## 2022-03-03 DIAGNOSIS — E78.5 HYPERLIPIDEMIA LDL GOAL <100: ICD-10-CM

## 2022-03-03 DIAGNOSIS — N18.6 ESRD NEEDING DIALYSIS (H): ICD-10-CM

## 2022-03-03 LAB
ALBUMIN SERPL-MCNC: 3.9 G/DL (ref 3.5–5)
ALP SERPL-CCNC: 131 U/L (ref 45–120)
ALT SERPL W P-5'-P-CCNC: 10 U/L (ref 0–45)
ANION GAP SERPL CALCULATED.3IONS-SCNC: 20 MMOL/L (ref 5–18)
AST SERPL W P-5'-P-CCNC: 16 U/L (ref 0–40)
BASOPHILS # BLD AUTO: 0.1 10E3/UL (ref 0–0.2)
BASOPHILS NFR BLD AUTO: 1 %
BILIRUB SERPL-MCNC: 0.5 MG/DL (ref 0–1)
BUN SERPL-MCNC: 37 MG/DL (ref 8–22)
CALCIUM SERPL-MCNC: 8.7 MG/DL (ref 8.5–10.5)
CHLORIDE BLD-SCNC: 92 MMOL/L (ref 98–107)
CHOLEST SERPL-MCNC: 151 MG/DL
CLUE CELLS: ABNORMAL
CO2 SERPL-SCNC: 26 MMOL/L (ref 22–31)
CREAT SERPL-MCNC: 7.35 MG/DL (ref 0.6–1.1)
EOSINOPHIL # BLD AUTO: 0.2 10E3/UL (ref 0–0.7)
EOSINOPHIL NFR BLD AUTO: 3 %
ERYTHROCYTE [DISTWIDTH] IN BLOOD BY AUTOMATED COUNT: 13.9 % (ref 10–15)
FASTING STATUS PATIENT QL REPORTED: YES
GFR SERPL CREATININE-BSD FRML MDRD: 6 ML/MIN/1.73M2
GLUCOSE BLD-MCNC: 84 MG/DL (ref 70–125)
HCG SERPL-ACNC: 9 MLU/ML (ref 0–4)
HCT VFR BLD AUTO: 38.6 % (ref 35–47)
HDLC SERPL-MCNC: 37 MG/DL
HGB BLD-MCNC: 11.4 G/DL (ref 11.7–15.7)
HIV 1+2 AB+HIV1 P24 AG SERPL QL IA: NEGATIVE
IMM GRANULOCYTES # BLD: 0 10E3/UL
IMM GRANULOCYTES NFR BLD: 0 %
LDLC SERPL CALC-MCNC: 93 MG/DL
LYMPHOCYTES # BLD AUTO: 2.2 10E3/UL (ref 0.8–5.3)
LYMPHOCYTES NFR BLD AUTO: 27 %
MCH RBC QN AUTO: 32.9 PG (ref 26.5–33)
MCHC RBC AUTO-ENTMCNC: 29.5 G/DL (ref 31.5–36.5)
MCV RBC AUTO: 111 FL (ref 78–100)
MONOCYTES # BLD AUTO: 0.9 10E3/UL (ref 0–1.3)
MONOCYTES NFR BLD AUTO: 10 %
NEUTROPHILS # BLD AUTO: 4.8 10E3/UL (ref 1.6–8.3)
NEUTROPHILS NFR BLD AUTO: 59 %
PLATELET # BLD AUTO: 205 10E3/UL (ref 150–450)
POTASSIUM BLD-SCNC: 5.6 MMOL/L (ref 3.5–5)
PROT SERPL-MCNC: 7.7 G/DL (ref 6–8)
RBC # BLD AUTO: 3.47 10E6/UL (ref 3.8–5.2)
SODIUM SERPL-SCNC: 138 MMOL/L (ref 136–145)
TRICHOMONAS, WET PREP: ABNORMAL
TRIGL SERPL-MCNC: 107 MG/DL
TSH SERPL DL<=0.005 MIU/L-ACNC: 1.5 UIU/ML (ref 0.3–5)
WBC # BLD AUTO: 8.2 10E3/UL (ref 4–11)
WBC'S/HIGH POWER FIELD, WET PREP: ABNORMAL
YEAST, WET PREP: ABNORMAL

## 2022-03-03 PROCEDURE — 86780 TREPONEMA PALLIDUM: CPT | Performed by: INTERNAL MEDICINE

## 2022-03-03 PROCEDURE — 36415 COLL VENOUS BLD VENIPUNCTURE: CPT | Performed by: INTERNAL MEDICINE

## 2022-03-03 PROCEDURE — 80061 LIPID PANEL: CPT | Performed by: INTERNAL MEDICINE

## 2022-03-03 PROCEDURE — 87491 CHLMYD TRACH DNA AMP PROBE: CPT | Performed by: INTERNAL MEDICINE

## 2022-03-03 PROCEDURE — 84702 CHORIONIC GONADOTROPIN TEST: CPT | Performed by: INTERNAL MEDICINE

## 2022-03-03 PROCEDURE — 80050 GENERAL HEALTH PANEL: CPT | Performed by: INTERNAL MEDICINE

## 2022-03-03 PROCEDURE — 87340 HEPATITIS B SURFACE AG IA: CPT | Performed by: INTERNAL MEDICINE

## 2022-03-03 PROCEDURE — 87591 N.GONORRHOEAE DNA AMP PROB: CPT | Performed by: INTERNAL MEDICINE

## 2022-03-03 PROCEDURE — 86803 HEPATITIS C AB TEST: CPT | Performed by: INTERNAL MEDICINE

## 2022-03-03 PROCEDURE — 87389 HIV-1 AG W/HIV-1&-2 AB AG IA: CPT | Performed by: INTERNAL MEDICINE

## 2022-03-03 PROCEDURE — 99396 PREV VISIT EST AGE 40-64: CPT | Performed by: INTERNAL MEDICINE

## 2022-03-03 PROCEDURE — 87210 SMEAR WET MOUNT SALINE/INK: CPT | Performed by: INTERNAL MEDICINE

## 2022-03-03 ASSESSMENT — ASTHMA QUESTIONNAIRES
QUESTION_5 LAST FOUR WEEKS HOW WOULD YOU RATE YOUR ASTHMA CONTROL: COMPLETELY CONTROLLED
QUESTION_4 LAST FOUR WEEKS HOW OFTEN HAVE YOU USED YOUR RESCUE INHALER OR NEBULIZER MEDICATION (SUCH AS ALBUTEROL): NOT AT ALL
ACUTE_EXACERBATION_TODAY: NO
QUESTION_3 LAST FOUR WEEKS HOW OFTEN DID YOUR ASTHMA SYMPTOMS (WHEEZING, COUGHING, SHORTNESS OF BREATH, CHEST TIGHTNESS OR PAIN) WAKE YOU UP AT NIGHT OR EARLIER THAN USUAL IN THE MORNING: NOT AT ALL
ACT_TOTALSCORE: 25
QUESTION_2 LAST FOUR WEEKS HOW OFTEN HAVE YOU HAD SHORTNESS OF BREATH: NOT AT ALL
ACT_TOTALSCORE: 25
QUESTION_1 LAST FOUR WEEKS HOW MUCH OF THE TIME DID YOUR ASTHMA KEEP YOU FROM GETTING AS MUCH DONE AT WORK, SCHOOL OR AT HOME: NONE OF THE TIME

## 2022-03-03 ASSESSMENT — ACTIVITIES OF DAILY LIVING (ADL): CURRENT_FUNCTION: NO ASSISTANCE NEEDED

## 2022-03-03 NOTE — PATIENT INSTRUCTIONS
1. Ask insurance about shingles vaccine ( 2 shots 2-6 apart).    2. Pap smear is due 2023    3.Will check vaginal swab today, cab check STDs: vaginal swab and labs.

## 2022-03-03 NOTE — PROGRESS NOTES
"Answers for HPI/ROS submitted by the patient on 2/24/2022  In general, how would you rate your overall physical health?: good  Frequency of exercise:: 1 day/week  Do you usually eat at least 4 servings of fruit and vegetables a day, include whole grains & fiber, and avoid regularly eating high fat or \"junk\" foods? : Yes  Taking medications regularly:: Yes  Activities of Daily Living: no assistance needed  Home safety: no safety concerns identified  Hearing Impairment:: no hearing concerns  In the past 6 months, have you been bothered by leaking of urine?: No  In general, how would you rate your overall mental or emotional health?: good  Additional concerns today:: No  Duration of exercise:: Less than 15 minutes       SUBJECTIVE:   CC: Alla Shrestha is an 51 year old woman who presents for preventive health visit.     Alla is a 50 y.o. female with  history of end stage kidney disease/HD,  hypertension , anemia, history of fibroids and partial hysterectomy and , obesity, gout,bl knee OA and smoking .      Alla is doing well since I last saw her.  Initially for her end-stage kidney disease she was on peritoneal dialysis but due to infection has had to switch to hemodialysis and was doing it for the last 12 months.  She is currently on the transplant list at [facility].  She quit smoking 2 weeks ago and does not plan to go back to that habit.  She has lost weight and was able to come off her blood pressure medications.  She is followed better diet.    Most recently she was seen by cardiology due to abnormal stress echo test she was recommended to have cardiac catheterization done which is in the process of scheduling.  She denies any problems of his previous surgical procedures (she had bilateral knee replacements): No bleeding, clotting or anesthesia problems and no family history of such.    She has had partial hysterectomy due to fibroids: She still has intact cervix.  Last Pap smear was in 2018 and was " "normal with negative HPV.  She is not due for repeat until next year.  She has had mild vaginal discharge.  She opted for STD screening but last sexual partner was a year ago.  No vaginal spotting.  For some reason for the transplant blood work included beta-hCG and it was elevated.  We will repeat it again today.    She has been exercising by walking her dogs but denies any chest pains palpitations or dizziness.    She has smoked for 20 years, half a pack a day, currently quit 2 weeks ago and plans to stay that way.  She denies any persistent shortness of breath, cough or wheezing.    She has had negative Cologuard testing December at [facility].    {Patient has been advised of split billing requirements and indicates understanding: Yes  Healthy Habits:     In general, how would you rate your overall health?  Good    Frequency of exercise:  1 day/week    Duration of exercise:  Less than 15 minutes    Do you usually eat at least 4 servings of fruit and vegetables a day, include whole grains    & fiber and avoid regularly eating high fat or \"junk\" foods?  Yes    Taking medications regularly:  Yes    Ability to successfully perform activities of daily living:  No assistance needed    Home Safety:  No safety concerns identified    Hearing Impairment:  No hearing concerns    In the past 6 months, have you been bothered by leaking of urine?  No    In general, how would you rate your overall mental or emotional health?  Good      PHQ-2 Total Score: 0    Additional concerns today:  No    Today's PHQ-2 Score:   PHQ-2 ( 1999 Pfizer) 2/24/2022   Q1: Little interest or pleasure in doing things 0   Q2: Feeling down, depressed or hopeless 0   PHQ-2 Score 0   PHQ-2 Total Score (12-17 Years)- Positive if 3 or more points; Administer PHQ-A if positive -   Q1: Little interest or pleasure in doing things Not at all   Q2: Feeling down, depressed or hopeless Not at all   PHQ-2 Score 0       Abuse: Current or Past (Physical, Sexual or " Emotional) - No  Do you feel safe in your environment? Yes    Social History     Tobacco Use     Smoking status: Current Every Day Smoker     Packs/day: 0.30     Years: 20.00     Pack years: 6.00     Types: Cigarettes, Cigarettes, Cigarettes     Smokeless tobacco: Never Used     Tobacco comment: 6 cigs /day   Substance Use Topics     Alcohol use: Yes     Comment: Alcoholic Drinks/day: rare         Alcohol Use 2/24/2022   Prescreen: >3 drinks/day or >7 drinks/week? No     Breast Cancer Screening:    Breast CA Risk Assessment (FHS-7) 2/24/2022   Do you have a family history of breast, colon, or ovarian cancer? No / Unknown     Pertinent mammograms are reviewed under the imaging tab.    History of abnormal Pap smear:   PAP / HPV Latest Ref Rng & Units 8/3/2018   PAP - Negative for squamous intraepithelial lesion or malignancy  Electronically signed by Rosalind Knowles CT (ASCP) on 8/10/2018 at 10:38 AM     HPV16 NEG Negative   HPV18 NEG Negative   HRHPV NEG Negative     Reviewed and updated as needed this visit by clinical staff                Reviewed and updated as needed this visit by Provider                 Review of Systems  As above, no blood in the stool.     OBJECTIVE:   /72 (BP Location: Left arm, Patient Position: Sitting, Cuff Size: Adult Regular)   Pulse 110   Wt 113.9 kg (251 lb)   SpO2 90%   BMI 39.31 kg/m    Physical Exam  General: well appearing female, alert and oriented x3  EYES: Eyelids, conjunctiva, and sclera were normal. Pupils were normal.   HEAD, EARS, NOSE, MOUTH, AND THROAT: no cervical LAD, no thyromegaly or nodules appreciated. TMs are visualized and normal, oropharynx is clear.  RESPIRATORY: respirations non labored, CTA bl, no wheezes, rales, no forced expiratory wheezing.  Slightly decreased breath sounds bilaterally.  CARDIOVASCULAR: Heart rate and rhythm were normal. No murmurs, rubs,gallops. There was no peripheral edema.   GASTROINTESTINAL: Positive bowel sounds,  abdomen is soft, non tender, non distended.     MUSCULOSKELETAL: Muscle mass was normal for age. No joint synovitis or deformity.  LYMPHATIC: There were no enlarged nodes palpable.  SKIN/HAIR/NAILS: Skin color was normal.  No rashes.  NEUROLOGIC: The patient was alert and oriented.  Speech was normal.  There is no facial asymmetry.   PSYCHIATRIC:  Mood and affect were normal.   Breast exam: No axilla lymphadenopathy, breast masses or skin changes appreciated.      ASSESSMENT/PLAN:   Alla was seen today for physical.    Diagnoses and all orders for this visit:    Annual physical exam  She had mammogram in December of last year which was normal, she had Cologuard testing at [facility] in December which was negative as well.  Discussed shingles vaccine and she can ask her insurance for coverage.  She had normal Pap smear and negative HPV in 2018 and does not need repeat until next year.  She is up-to-date on vaccinations    ESRD needing dialysis (H)  Initially she was on peritoneal dialysis but then had to switch to hemodialysis due to infections and has had it for 12 months.  She is on a transplant list at [facility] and recently had evaluation there in December.  She recently quit smoking.  -     CBC with platelets and differential  -     Comprehensive metabolic panel  -     TSH with free T4 reflex  -     HCG Quantitative Pregnancy, Blood ([id number])    Secondary renal hyperparathyroidism (H)  Followed by nephrologist, last parathyroid hormone was checked in December    Hyperlipidemia LDL goal <100  She is on Lipitor  -     Lipid panel reflex to direct LDL Fasting  -     Comprehensive metabolic panel    Vaginal discharge  -     Wet prep - Clinic Collect    Screen for STD (sexually transmitted disease)  -     NEISSERIA GONORRHOEA PCR  -     CHLAMYDIA TRACHOMATIS PCR  -     Hepatitis B surface antigen  -     Hepatitis C antibody  -     HIV Antigen Antibody Combo  -     Treponema Abs w Reflex to RPR and  "Titer    Abnormal stress test  She will be having cardiac catheterization with cardiology at [facility] in the near future.  No history of bleeding, clotting or anesthesia problems is recent knee replacement surgeries.    Other orders  -     REVIEW OF HEALTH MAINTENANCE PROTOCOL ORDERS      Estimated body mass index is 37.75 kg/m  as calculated from the following:    Height as of 11/17/20: 1.702 m (5' 7\").    Weight as of 11/17/20: 109.3 kg (241 lb).  She reports that she has been smoking cigarettes, cigarettes, and cigarettes. She has a 6.00 pack-year smoking history. She has never used smokeless tobacco.    Betty Vazquez MD  [facility]  "

## 2022-03-04 LAB
C TRACH DNA SPEC QL NAA+PROBE: NEGATIVE
HBV SURFACE AG SERPL QL IA: NONREACTIVE
HCV AB SERPL QL IA: NEGATIVE
N GONORRHOEA DNA SPEC QL NAA+PROBE: NEGATIVE
T PALLIDUM AB SER QL: NONREACTIVE

## 2022-03-04 NOTE — TELEPHONE ENCOUNTER
Cologjesus and PTH labs were done at [facility] in December, her results did not crossover to health maintenance, please fix that if possible (it would be great to have a link to [facility]'s results) or abstract.

## 2022-03-04 NOTE — TELEPHONE ENCOUNTER
[facility] lab calling at 10:01 pm.    3/3/22 elevated Creatinine 7.35,  drawn today at 4:07 pm.  Saw Dr. Vazquez at [facility].    History of end stage kidney disease on hemodialysi,  hypertension , anemia, history of fibroids and partial hysterectomy and , obesity, gout,bl knee OA and smoking. is currently on the transplant list at [facility].     Writer paged on call provider Dr. Foster at 10:15 pm with help of page .      Kathrine Rivera RN, MA  [facility] Triage Nurse Advisor  ;  Reason for Disposition    Lab or radiology calling with CRITICAL test results    Protocols used: PCP CALL - NO TRIAGE-A-

## 2022-07-13 ENCOUNTER — NURSE TRIAGE (OUTPATIENT)
Dept: NURSING | Facility: CLINIC | Age: 52
End: 2022-07-13

## 2022-07-13 NOTE — TELEPHONE ENCOUNTER
Patient is complaining of itching rash that started from abdominal area  Rash is now on neck, face, and around eyes, and face is swollen  Cortisone creme and benadryl creme is not effective  Rash had cleared up and now has returned    Reason for Disposition    Widespread rash also present    SEVERE itching    Patient wants to be seen    Additional Information    Negative: Life-threatening reaction (anaphylaxis) in the past to similar substance (e.g., food, insect bite/sting, chemical, etc.) and < 2 hours since exposure    Negative: Difficulty breathing or wheezing    Negative: Difficulty swallowing or slurred speech and sudden onset    Negative: Sounds like a life-threatening emergency to the triager    Negative: Insect bites suspected    Negative: Hives suspected (urticaria, itchy pink bumps with pale centers that come and go over minutes to hours)    Negative: Sudden onset of rash (within last 2 hours) and difficulty with breathing or swallowing    Negative: Difficult to awaken or acting confused (e.g., disoriented, slurred speech)    Negative: Fever and purple or blood-colored spots or dots    Negative: Too weak or sick to stand    Negative: Life-threatening reaction (anaphylaxis) in the past to similar substance (e.g., food, insect bite/sting, chemical, etc.) and < 2 hours since exposure    Negative: Sounds like a life-threatening emergency to the triager    Negative: Insect bites suspected    Negative: Sunburn suspected    Negative: Hives suspected    Negative: Drug rash suspected and started taking new medicine within last 2 weeks (Exception: antihistamine, eye drops, ear drops, decongestant or other OTC cough/cold medicines)    Negative: Bright red, sunburn-like rash and current tampon use    Negative: Bright red, sunburn-like rash and current tampon use or nasal packing    Negative: Bright red, sunburn-like rash and wound infection or recent surgery    Negative: Bright red skin that peels off in sheets     Negative: Stiff neck (can't touch chin to chest)    Negative: Patient sounds very sick or weak to the triager    Negative: Fever    Negative: Face becomes swollen    Negative: Headache    Negative: Purple or blood-colored spots or dots (no fever and sounds well to triager)    Negative: Joint pain or swelling    Negative: Sores in mouth    Negative: Rash looks like large or small blisters (i.e., fluid filled bubbles or sacs on the skin)    Negative: Pregnant    Negative: Rash began within 4 hours of a new prescription medication    Negative: Sore throat    Negative: Ring-like appearance of rash (or ask: does it look like a 'target' or 'bulls-eye')    Protocols used: ITCHING - WIDESPREAD-A-OH, RASH OR REDNESS - WIDESPREAD-A-OH

## 2022-07-13 NOTE — TELEPHONE ENCOUNTER
Attempted to contact patient to relay message below from Dr Vazquez. No answer. Left generic message for patient to be seen by urgent care and to call clinic with further questions.

## 2022-07-14 NOTE — TELEPHONE ENCOUNTER
Provider Recommendation Follow Up:   Reached patient/caregiver. Informed of provider's recommendations. Patient verbalized understanding and agrees with the plan indicated by Dr. Vazquez. Pt is going to urgent care this morning.

## 2022-12-26 ENCOUNTER — HEALTH MAINTENANCE LETTER (OUTPATIENT)
Age: 52
End: 2022-12-26

## 2023-01-03 ENCOUNTER — ANCILLARY ORDERS (OUTPATIENT)
Dept: MAMMOGRAPHY | Facility: CLINIC | Age: 53
End: 2023-01-03

## 2023-01-03 ENCOUNTER — ANCILLARY ORDERS (OUTPATIENT)
Dept: INTERNAL MEDICINE | Facility: CLINIC | Age: 53
End: 2023-01-03

## 2023-01-03 ENCOUNTER — ANCILLARY PROCEDURE (OUTPATIENT)
Dept: MAMMOGRAPHY | Facility: CLINIC | Age: 53
End: 2023-01-03
Attending: INTERNAL MEDICINE
Payer: MEDICARE

## 2023-01-03 DIAGNOSIS — Z12.31 VISIT FOR SCREENING MAMMOGRAM: ICD-10-CM

## 2023-01-03 DIAGNOSIS — N64.89 BREAST ASYMMETRY: ICD-10-CM

## 2023-01-03 PROCEDURE — 77067 SCR MAMMO BI INCL CAD: CPT | Mod: TC | Performed by: RADIOLOGY

## 2023-01-03 PROCEDURE — 77063 BREAST TOMOSYNTHESIS BI: CPT | Mod: TC | Performed by: RADIOLOGY

## 2023-01-27 ENCOUNTER — OFFICE VISIT (OUTPATIENT)
Dept: INTERNAL MEDICINE | Facility: CLINIC | Age: 53
End: 2023-01-27
Payer: MEDICARE

## 2023-01-27 VITALS
HEART RATE: 70 BPM | BODY MASS INDEX: 36.49 KG/M2 | RESPIRATION RATE: 14 BRPM | WEIGHT: 233 LBS | SYSTOLIC BLOOD PRESSURE: 102 MMHG | DIASTOLIC BLOOD PRESSURE: 60 MMHG | OXYGEN SATURATION: 98 %

## 2023-01-27 DIAGNOSIS — D84.821 IMMUNODEFICIENCY DUE TO DRUGS (H): ICD-10-CM

## 2023-01-27 DIAGNOSIS — I10 PRIMARY HYPERTENSION: ICD-10-CM

## 2023-01-27 DIAGNOSIS — Z93.6 NEPHROSTOMY STATUS (H): ICD-10-CM

## 2023-01-27 DIAGNOSIS — I34.0 MITRAL VALVE INSUFFICIENCY, UNSPECIFIED ETIOLOGY: ICD-10-CM

## 2023-01-27 DIAGNOSIS — D64.9 ANEMIA, UNSPECIFIED TYPE: ICD-10-CM

## 2023-01-27 DIAGNOSIS — N18.5 CKD (CHRONIC KIDNEY DISEASE) STAGE 5, GFR LESS THAN 15 ML/MIN (H): ICD-10-CM

## 2023-01-27 DIAGNOSIS — Z23 HIGH PRIORITY FOR 2019-NCOV VACCINE: ICD-10-CM

## 2023-01-27 DIAGNOSIS — Z94.0 STATUS POST KIDNEY TRANSPLANT: Primary | ICD-10-CM

## 2023-01-27 DIAGNOSIS — Z79.899 IMMUNODEFICIENCY DUE TO DRUGS (H): ICD-10-CM

## 2023-01-27 DIAGNOSIS — Z87.898 HISTORY OF ABNORMAL BREATHING PATTERN: ICD-10-CM

## 2023-01-27 PROBLEM — F10.21 ALCOHOL DEPENDENCE IN REMISSION (H): Status: ACTIVE | Noted: 2023-01-27

## 2023-01-27 LAB
ANION GAP SERPL CALCULATED.3IONS-SCNC: 13 MMOL/L (ref 7–15)
BASOPHILS # BLD AUTO: 0 10E3/UL (ref 0–0.2)
BASOPHILS NFR BLD AUTO: 1 %
BUN SERPL-MCNC: 31.6 MG/DL (ref 6–20)
CALCIUM SERPL-MCNC: 10.5 MG/DL (ref 8.6–10)
CHLORIDE SERPL-SCNC: 103 MMOL/L (ref 98–107)
CREAT SERPL-MCNC: 1.74 MG/DL (ref 0.51–0.95)
DEPRECATED HCO3 PLAS-SCNC: 25 MMOL/L (ref 22–29)
EOSINOPHIL # BLD AUTO: 0.1 10E3/UL (ref 0–0.7)
EOSINOPHIL NFR BLD AUTO: 2 %
ERYTHROCYTE [DISTWIDTH] IN BLOOD BY AUTOMATED COUNT: 13.3 % (ref 10–15)
GFR SERPL CREATININE-BSD FRML MDRD: 35 ML/MIN/1.73M2
GLUCOSE SERPL-MCNC: 101 MG/DL (ref 70–99)
HCT VFR BLD AUTO: 28.9 % (ref 35–47)
HGB BLD-MCNC: 8.5 G/DL (ref 11.7–15.7)
IMM GRANULOCYTES # BLD: 0 10E3/UL
IMM GRANULOCYTES NFR BLD: 0 %
IRON BINDING CAPACITY (ROCHE): 237 UG/DL (ref 240–430)
IRON SATN MFR SERPL: 25 % (ref 15–46)
IRON SERPL-MCNC: 59 UG/DL (ref 37–145)
LYMPHOCYTES # BLD AUTO: 0.3 10E3/UL (ref 0.8–5.3)
LYMPHOCYTES NFR BLD AUTO: 4 %
MCH RBC QN AUTO: 30.6 PG (ref 26.5–33)
MCHC RBC AUTO-ENTMCNC: 29.4 G/DL (ref 31.5–36.5)
MCV RBC AUTO: 104 FL (ref 78–100)
MONOCYTES # BLD AUTO: 0.6 10E3/UL (ref 0–1.3)
MONOCYTES NFR BLD AUTO: 10 %
NEUTROPHILS # BLD AUTO: 4.9 10E3/UL (ref 1.6–8.3)
NEUTROPHILS NFR BLD AUTO: 83 %
PLATELET # BLD AUTO: 203 10E3/UL (ref 150–450)
POTASSIUM SERPL-SCNC: 4.1 MMOL/L (ref 3.4–5.3)
RBC # BLD AUTO: 2.78 10E6/UL (ref 3.8–5.2)
SODIUM SERPL-SCNC: 141 MMOL/L (ref 136–145)
WBC # BLD AUTO: 5.9 10E3/UL (ref 4–11)

## 2023-01-27 PROCEDURE — 90682 RIV4 VACC RECOMBINANT DNA IM: CPT | Performed by: INTERNAL MEDICINE

## 2023-01-27 PROCEDURE — 83550 IRON BINDING TEST: CPT | Performed by: INTERNAL MEDICINE

## 2023-01-27 PROCEDURE — G0008 ADMIN INFLUENZA VIRUS VAC: HCPCS | Performed by: INTERNAL MEDICINE

## 2023-01-27 PROCEDURE — 83540 ASSAY OF IRON: CPT | Performed by: INTERNAL MEDICINE

## 2023-01-27 PROCEDURE — 36415 COLL VENOUS BLD VENIPUNCTURE: CPT | Performed by: INTERNAL MEDICINE

## 2023-01-27 PROCEDURE — 99214 OFFICE O/P EST MOD 30 MIN: CPT | Mod: 25 | Performed by: INTERNAL MEDICINE

## 2023-01-27 PROCEDURE — 85025 COMPLETE CBC W/AUTO DIFF WBC: CPT | Performed by: INTERNAL MEDICINE

## 2023-01-27 PROCEDURE — 91313 COVID-19 VACCINE BIVALENT BOOSTER 18+ (MODERNA): CPT | Performed by: INTERNAL MEDICINE

## 2023-01-27 PROCEDURE — 0134A COVID-19 VACCINE BIVALENT BOOSTER 18+ (MODERNA): CPT | Performed by: INTERNAL MEDICINE

## 2023-01-27 PROCEDURE — 80048 BASIC METABOLIC PNL TOTAL CA: CPT | Performed by: INTERNAL MEDICINE

## 2023-01-27 RX ORDER — TORSEMIDE 20 MG/1
TABLET ORAL
COMMUNITY
Start: 2023-01-24

## 2023-01-27 RX ORDER — PREDNISONE 5 MG/1
5 TABLET ORAL DAILY
COMMUNITY
Start: 2022-12-21

## 2023-01-27 RX ORDER — MYCOPHENOLATE MOFETIL 250 MG/1
CAPSULE ORAL
COMMUNITY
Start: 2022-09-22

## 2023-01-27 RX ORDER — CARVEDILOL 6.25 MG/1
18.75 TABLET ORAL 2 TIMES DAILY
COMMUNITY
Start: 2023-01-24

## 2023-01-27 RX ORDER — FERROUS SULFATE 325(65) MG
65 TABLET, DELAYED RELEASE (ENTERIC COATED) ORAL
COMMUNITY
Start: 2022-11-11 | End: 2024-03-11

## 2023-01-27 RX ORDER — PANTOPRAZOLE SODIUM 40 MG/1
40 TABLET, DELAYED RELEASE ORAL DAILY
COMMUNITY
Start: 2022-12-05

## 2023-01-27 RX ORDER — ISOSORBIDE MONONITRATE 30 MG/1
1 TABLET, EXTENDED RELEASE ORAL
COMMUNITY
Start: 2023-01-24

## 2023-01-27 RX ORDER — ASCORBIC ACID 100 MG
1 TABLET,CHEWABLE ORAL DAILY
COMMUNITY
Start: 2022-11-11 | End: 2023-11-11

## 2023-01-27 RX ORDER — LEVOFLOXACIN 750 MG/1
TABLET, FILM COATED ORAL
COMMUNITY
Start: 2022-09-30 | End: 2024-03-11

## 2023-01-27 RX ORDER — SULFAMETHOXAZOLE AND TRIMETHOPRIM 400; 80 MG/1; MG/1
TABLET ORAL
COMMUNITY
Start: 2023-01-26 | End: 2024-03-11

## 2023-01-27 RX ORDER — CYANOCOBALAMIN 1000 UG/ML
1000 INJECTION, SOLUTION INTRAMUSCULAR; SUBCUTANEOUS
COMMUNITY
Start: 2022-07-07 | End: 2023-07-07

## 2023-01-27 ASSESSMENT — ASTHMA QUESTIONNAIRES
QUESTION_2 LAST FOUR WEEKS HOW OFTEN HAVE YOU HAD SHORTNESS OF BREATH: THREE TO SIX TIMES A WEEK
QUESTION_4 LAST FOUR WEEKS HOW OFTEN HAVE YOU USED YOUR RESCUE INHALER OR NEBULIZER MEDICATION (SUCH AS ALBUTEROL): NOT AT ALL
ACT_TOTALSCORE: 18
QUESTION_1 LAST FOUR WEEKS HOW MUCH OF THE TIME DID YOUR ASTHMA KEEP YOU FROM GETTING AS MUCH DONE AT WORK, SCHOOL OR AT HOME: NONE OF THE TIME
QUESTION_3 LAST FOUR WEEKS HOW OFTEN DID YOUR ASTHMA SYMPTOMS (WHEEZING, COUGHING, SHORTNESS OF BREATH, CHEST TIGHTNESS OR PAIN) WAKE YOU UP AT NIGHT OR EARLIER THAN USUAL IN THE MORNING: ONCE A WEEK
QUESTION_5 LAST FOUR WEEKS HOW WOULD YOU RATE YOUR ASTHMA CONTROL: POORLY CONTROLLED
ACT_TOTALSCORE: 18

## 2023-01-27 NOTE — PATIENT INSTRUCTIONS
B/p is good. If b/p gets to be <100, let cardiology know, may discharge Imdur.    2. Covid and flu shot today    3. Hemoglobin and kidney labs today

## 2023-01-27 NOTE — PROGRESS NOTES
Assessment & Plan     Status post kidney transplant, immunodeficiency status  She has had kidney transplant in September 2022, currently she is on CellCept, prednisone, Bactrim and Prograf.  She is followed by transplant nephrology team at [facility].  - Adult Sleep Eval & Management  Referral; Future    CKD (chronic kidney disease) stage 5, GFR less than 15 ml/min (H)  Recent hospitalization for CHF, acute renal failure due to high-grade stricture and transplant ureter.  Creatinine is improving with nephrostomy tube.  Weight has been stable and she has not needed to use her torsemide.  She knows to start using it if her weight is above 233 pounds.  We will repeat BMP today.  She will follow-up with his transplant nephrology next week as well.  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  She will be seeing urology and pain in the future.  - Adult Sleep Eval & Management  Referral; Future    Mitral valve insufficiency, unspecified etiology  Echo showed ejection fraction of 67% but she does have severe mitral valve regurgitation.  Currently she is euvolemic.  Cardiology plans to have mitral valve surgery done once her kidney issue has resolved.  - Adult Sleep Eval & Management  Referral; Future    Primary hypertension  Coreg dose was increased to 25 mg twice a day.  She is also on Imdur.  Blood pressure today is good, she denies any dizziness at home, at home systolic blood pressure is 117-110.  Per discharge summary if she started to experience hypotension, okay to stop Imdur.    Anemia, unspecified type  Mild chronic anemia.  Hemoglobin was 8 at discharge.  - CBC with platelets and differential  - Iron and iron binding capacity    High priority for 2019-nCoV vaccine  - COVID-19,PF,MODERNA BIVALENT 18+Yrs  Flu shot was administered today as well    History of abnormal breathing pattern  Sleep apnea referral as per  recommendation from May  - Adult Sleep Eval & Management  Referral; Future     MED REC REQUIRED  Post Medication Reconciliation Status: discharge medications reconciled, continue medications without change      Betty Vazquez MD  [facility]    Subjective   Alla is a 52 year old female, presenting for the following health issues:  Hospital F/U (Transplant kidney-[facility] set this up)      HPI       Hospital Follow-up Visit:    Hospital/Nursing Home/IP Rehab Facility: [facility]   Date of Admission: 1/13/23  Date of Discharge: 1/25/23  Reason(s) for Admission: heart    Was your hospitalization related to COVID-19? No   Problems taking medications regularly:  None  Medication changes since discharge: None  Problems adhering to non-medication therapy: No    Summary of hospitalization:  CareEverywhere information obtained and reviewed  Diagnostic Tests/Treatments reviewed.  Follow up needed: She will follow-up with [facility] nephrology, urology and cardiology.  Other Healthcare Providers Involved in Patient s Care:         As above  Update since discharge: improved.         Plan of care communicated with patient           Alla is a 52 y.o. female with  history of end stage kidney disease/HD,  hypertension , anemia, history of fibroids and partial hysterectomy and , obesity, gout,bl knee OA and smoking .  She is currently here to follow-up from recent hospitalization at [facility].    Since I last saw her she got kidney transplant in September of last year.  She has been on immunosuppressive therapy.  On January 13 she was admitted with increased shortness of breath and congestive heart failure.  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  Nephrology was involved and urology ANCA put in nephrostomy tube.  Since then  creatinine has been slowly improving.  She has history of anemia of chronic disease and hemoglobin was 8 at discharge.  In the hospital she was dialyzed with IV Lasix which was stopped at discharge and currently she has prescription for torsemide to use as needed but weight has been stable at home at 233 pounds.  Her Coreg dose was increased to 25 mg twice a day, she is also on Imdur and blood pressure today is 102.  At home it is 110-117, she denies orthostasis.  Discharge summary did not mention that if her blood pressure was to drop stable clinic to stop her Imdur.  She has a bunch of follow-up scheduled with transplant nephrology next week, cardiology following months.  Also wanted her to have sleep apnea testing done due to oxygen drops at night.    She is currently on CellCept, prednisone, Bactrim and Prograf.    She quit smoking a year ago and is staying strong.    Psychologically she has been able to maintain a very positive attitude, she continues to work.    Review of Systems   As above      Objective    /60   Pulse 70   Resp 14   Wt 105.7 kg (233 lb)   SpO2 98%   BMI 36.49 kg/m    Body mass index is 36.49 kg/m .  Physical Exam   General: well appearing female, alert and oriented x3  EYES: Eyelids, conjunctiva, and sclera were normal. Pupils were normal.   HEAD, EARS, NOSE, MOUTH, AND THROAT: no cervical LAD, no thyromegaly or nodules appreciated. TMs are visualized and normal, oropharynx is clear.  RESPIRATORY: respirations non labored, CTA bl, no wheezes, rales, no forced expiratory wheezing.  CARDIOVASCULAR: Heart rate and rhythm were normal. No murmurs, rubs,gallops. There was no peripheral edema.   GASTROINTESTINAL: Positive bowel sounds, abdomen is soft, non tender, non distended.  Right lower abdomen nephrostomy tube is intact, no drainage or cellulitis noted.  MUSCULOSKELETAL: Muscle mass was normal for age. No joint synovitis or deformity.  LYMPHATIC: There were no enlarged nodes  palpable.  SKIN/HAIR/NAILS: Skin color was normal.  No rashes.  NEUROLOGIC: The patient was alert and oriented.  Speech was normal.  There is no facial asymmetry.   PSYCHIATRIC:  Mood and affect were normal.

## 2023-02-14 ENCOUNTER — TELEPHONE (OUTPATIENT)
Dept: INTERNAL MEDICINE | Facility: CLINIC | Age: 53
End: 2023-02-14

## 2023-02-14 DIAGNOSIS — Z93.6 NEPHROSTOMY STATUS (H): Primary | ICD-10-CM

## 2023-02-15 ENCOUNTER — ANCILLARY PROCEDURE (OUTPATIENT)
Dept: MAMMOGRAPHY | Facility: CLINIC | Age: 53
End: 2023-02-15
Attending: INTERNAL MEDICINE
Payer: MEDICARE

## 2023-02-15 DIAGNOSIS — N64.89 BREAST ASYMMETRY: ICD-10-CM

## 2023-02-15 PROCEDURE — 77061 BREAST TOMOSYNTHESIS UNI: CPT | Mod: LT

## 2023-02-16 ENCOUNTER — ANCILLARY ORDERS (OUTPATIENT)
Dept: INTERVENTIONAL RADIOLOGY/VASCULAR | Facility: CLINIC | Age: 53
End: 2023-02-16

## 2023-02-16 DIAGNOSIS — Z93.6 NEPHROSTOMY STATUS (H): ICD-10-CM

## 2023-02-16 NOTE — TELEPHONE ENCOUNTER
General Call      Reason for Call:  IR Radiology - pt stating her Urostomy tube is loose (stitching) and needs to be seen ASAP by radiology     Please advise    What are your questions or concerns:  n/a    Date of last appointment with provider: n/a    Could we send this information to you in [employer] or would you prefer to receive a phone call?:   Patient would prefer a phone call   Okay to leave a detailed message?: Yes at Home number on file [phone] (home)  
Information from 23 office visit note with Solid Organ Transplant provider Dr. Avinash anderson/ [facility]:     History of Present Illness   Ms. Shrestha is a 52 y.o. 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 She underwent sleeve gastrectomy in 2022. Subsequently, she underwent a  kidney donor transplant on 2022. She has had complicated course posttransplant, as outlined in the note of  [facility] from her 4 month posttransplant evaluation from 2022. During that evaluation, Ms. Shrestha was complaining of shortness of breath. As a workup, she underwent an echo that showed severe pulmonary hypertension with mitral stenosis and regurgitation. She was subsequently admitted to the hospital, and aggressively diuresed. She was also found to have hydronephrosis, and subsequently high-grade ureteral stricture on nephrostogram, that was managed by nephrostomy tube in 2023.      Spoke with patient:     Last evening when changing nephrostomy dressing, the nephrostomy stitching was coming loose from her skin. Pt had some moisture accumulation in the stitching area over the weekend, she wonders if this contributed? Nephrostomy is no longer secured with stitching. Pt did flush the tubing and reports that the drain is patent.     Pt did call [facility] and Jud asked pt to contact primary care provider to see if [facility] can set up IR to assess area so that patient does not have to back to [place] for this.     Pt does take 81 mg of aspirin daily.     IR referral pended for provider review.           
Patient returned our call.  She is also looking for referral to chiropractor.  
Pt is calling back to talk to Ali.    I did advise radiology would call her and transferred her to them but they transferred her back to me.  
Spoke with interventional radiology, they will call patient today to follow up on referral.   
Spoke with patient and IR has called her and is going to get her set up to have the stitching fixed for her tubing and make sure that it is still in the correct place.   
normal...

## 2023-02-16 NOTE — CONSULTS
Outpatient IR Referral    Patient is a 53 y/o female with a PMH of renal transplant 9/6/22 at [facility], s/p R PNT placed 1/18/23 at [facility] due to hydronephrosis, high grade ureteral stricture, last exchange 1/23/23.  Patient notes suture is loose, missing, called [facility] who recommends patient have a PNT check with resuture at [place]. Patient states her tube is secure, functional, denies pain, fevers. Referral sent to [place], patient okay to come to [facility] as she lives in [facility].     1/23/23 [facility] R PNT IMPRESSION:   1. Retracted and malpositioned 8.5 French right lower quadrant renal transplant nephrostomy tube.   2. Exchange, repositioning and upsizing for a 10 French nephrostomy tube. Drain to gravity bag   drainage.   3. Persistent stricture of the proximal/mid transplant ureter.        Procedure order placed.    Primary team Dr. Enriquez made aware of IR recommendations via epic messaging.      Fallon FOSTER  Interventional Radiology   IR on-call pager: [phone]

## 2023-02-20 ENCOUNTER — ANCILLARY PROCEDURE (OUTPATIENT)
Dept: GENERAL RADIOLOGY | Facility: CLINIC | Age: 53
End: 2023-02-20
Attending: INTERNAL MEDICINE
Payer: MEDICARE

## 2023-02-20 DIAGNOSIS — Z93.6 NEPHROSTOMY STATUS (H): ICD-10-CM

## 2023-02-20 PROCEDURE — 99212 OFFICE O/P EST SF 10 MIN: CPT | Performed by: PHYSICIAN ASSISTANT

## 2023-02-20 RX ORDER — LIDOCAINE HYDROCHLORIDE 10 MG/ML
5 INJECTION, SOLUTION EPIDURAL; INFILTRATION; INTRACAUDAL; PERINEURAL ONCE
Status: COMPLETED | OUTPATIENT
Start: 2023-02-20 | End: 2023-02-20

## 2023-02-20 RX ADMIN — LIDOCAINE HYDROCHLORIDE 5 ML: 10 INJECTION, SOLUTION EPIDURAL; INFILTRATION; INTRACAUDAL; PERINEURAL at 13:57

## 2023-02-20 NOTE — PROGRESS NOTES
Interventional Radiology Brief Post Procedure Note    Procedure: IR Nephrostomy Tube Check    Proceduralist: Carlos Alcocer PA-C    Assistant: None    Time Out: Prior to the start of the procedure and with procedural staff participation, I verbally confirmed the patient s identity using two indicators, relevant allergies, that the procedure was appropriate and matched the consent or emergent situation, and that the correct equipment/implants were available. Immediately prior to starting the procedure I conducted the Time Out with the procedural staff and re-confirmed the patient s name, procedure, and site/side. (The Joint Commission universal protocol was followed.)  Yes    Medications   Medication Event Details Admin User Admin Time       Sedation: None. Local Anesthestic used    Findings: Patient with right lower quadrants transplant kidney PNT with suture that has fallen out. Patient's PNT is draining normally. PNT re-sutured. Sterile dressing applied.    Estimated Blood Loss: None    SPECIMENS: None    Complications: 1. None     Condition: Stable    Plan: Follow-up per primary team.     Comments: See dictated procedure note for full details.    Carlos Alcocer PA-C

## 2023-04-22 ENCOUNTER — HEALTH MAINTENANCE LETTER (OUTPATIENT)
Age: 53
End: 2023-04-22

## 2023-06-12 DIAGNOSIS — Z79.899 HIGH RISK MEDICATION USE: ICD-10-CM

## 2023-06-12 DIAGNOSIS — Z48.22 ENCOUNTER FOR AFTERCARE FOLLOWING KIDNEY TRANSPLANT: Primary | ICD-10-CM

## 2023-06-12 DIAGNOSIS — D84.9 IMMUNODEFICIENCY (H): ICD-10-CM

## 2023-06-13 ENCOUNTER — LAB (OUTPATIENT)
Dept: LAB | Facility: CLINIC | Age: 53
End: 2023-06-13
Payer: COMMERCIAL

## 2023-06-13 DIAGNOSIS — D84.9 IMMUNODEFICIENCY (H): ICD-10-CM

## 2023-06-13 DIAGNOSIS — Z48.22 ENCOUNTER FOR AFTERCARE FOLLOWING KIDNEY TRANSPLANT: ICD-10-CM

## 2023-06-13 DIAGNOSIS — Z79.899 HIGH RISK MEDICATION USE: ICD-10-CM

## 2023-06-13 LAB
ANION GAP SERPL CALCULATED.3IONS-SCNC: 12 MMOL/L (ref 7–15)
BASOPHILS # BLD AUTO: 0 10E3/UL (ref 0–0.2)
BASOPHILS NFR BLD AUTO: 0 %
BUN SERPL-MCNC: 33.8 MG/DL (ref 6–20)
CALCIUM SERPL-MCNC: 10.4 MG/DL (ref 8.6–10)
CHLORIDE SERPL-SCNC: 100 MMOL/L (ref 98–107)
CREAT SERPL-MCNC: 1.75 MG/DL (ref 0.51–0.95)
CREAT UR-MCNC: 112 MG/DL
DEPRECATED HCO3 PLAS-SCNC: 30 MMOL/L (ref 22–29)
EOSINOPHIL # BLD AUTO: 0.1 10E3/UL (ref 0–0.7)
EOSINOPHIL NFR BLD AUTO: 1 %
ERYTHROCYTE [DISTWIDTH] IN BLOOD BY AUTOMATED COUNT: 13.2 % (ref 10–15)
GFR SERPL CREATININE-BSD FRML MDRD: 34 ML/MIN/1.73M2
GLUCOSE SERPL-MCNC: 110 MG/DL (ref 70–99)
HCT VFR BLD AUTO: 35.9 % (ref 35–47)
HGB BLD-MCNC: 11.1 G/DL (ref 11.7–15.7)
IMM GRANULOCYTES # BLD: 0 10E3/UL
IMM GRANULOCYTES NFR BLD: 0 %
LYMPHOCYTES # BLD AUTO: 0.6 10E3/UL (ref 0.8–5.3)
LYMPHOCYTES NFR BLD AUTO: 8 %
MAGNESIUM SERPL-MCNC: 2.2 MG/DL (ref 1.7–2.3)
MCH RBC QN AUTO: 31.8 PG (ref 26.5–33)
MCHC RBC AUTO-ENTMCNC: 30.9 G/DL (ref 31.5–36.5)
MCV RBC AUTO: 103 FL (ref 78–100)
MICROALBUMIN UR-MCNC: 45.8 MG/L
MICROALBUMIN/CREAT UR: 40.89 MG/G CR (ref 0–25)
MONOCYTES # BLD AUTO: 0.7 10E3/UL (ref 0–1.3)
MONOCYTES NFR BLD AUTO: 8 %
NEUTROPHILS # BLD AUTO: 6.8 10E3/UL (ref 1.6–8.3)
NEUTROPHILS NFR BLD AUTO: 83 %
NRBC # BLD AUTO: 0 10E3/UL
NRBC BLD AUTO-RTO: 0 /100
PHOSPHATE SERPL-MCNC: 2.6 MG/DL (ref 2.5–4.5)
PLATELET # BLD AUTO: 184 10E3/UL (ref 150–450)
POTASSIUM SERPL-SCNC: 3.9 MMOL/L (ref 3.4–5.3)
RBC # BLD AUTO: 3.49 10E6/UL (ref 3.8–5.2)
SODIUM SERPL-SCNC: 142 MMOL/L (ref 136–145)
WBC # BLD AUTO: 8.3 10E3/UL (ref 4–11)

## 2023-06-13 PROCEDURE — 85025 COMPLETE CBC W/AUTO DIFF WBC: CPT

## 2023-06-13 PROCEDURE — 82570 ASSAY OF URINE CREATININE: CPT

## 2023-06-13 PROCEDURE — 83735 ASSAY OF MAGNESIUM: CPT

## 2023-06-13 PROCEDURE — 82374 ASSAY BLOOD CARBON DIOXIDE: CPT

## 2023-06-13 PROCEDURE — 82310 ASSAY OF CALCIUM: CPT

## 2023-06-13 PROCEDURE — 36415 COLL VENOUS BLD VENIPUNCTURE: CPT

## 2023-06-13 PROCEDURE — 84100 ASSAY OF PHOSPHORUS: CPT

## 2023-06-30 ENCOUNTER — LAB (OUTPATIENT)
Dept: LAB | Facility: CLINIC | Age: 53
End: 2023-06-30
Payer: COMMERCIAL

## 2023-06-30 DIAGNOSIS — Z79.899 HIGH RISK MEDICATION USE: ICD-10-CM

## 2023-06-30 DIAGNOSIS — D84.9 IMMUNODEFICIENCY (H): ICD-10-CM

## 2023-06-30 DIAGNOSIS — Z48.22 ENCOUNTER FOR AFTERCARE FOLLOWING KIDNEY TRANSPLANT: Primary | ICD-10-CM

## 2023-06-30 LAB
ANION GAP SERPL CALCULATED.3IONS-SCNC: 11 MMOL/L (ref 7–15)
BASOPHILS # BLD AUTO: 0 10E3/UL (ref 0–0.2)
BASOPHILS NFR BLD AUTO: 0 %
BUN SERPL-MCNC: 29.9 MG/DL (ref 6–20)
CALCIUM SERPL-MCNC: 10.5 MG/DL (ref 8.6–10)
CHLORIDE SERPL-SCNC: 96 MMOL/L (ref 98–107)
CREAT SERPL-MCNC: 1.47 MG/DL (ref 0.51–0.95)
CREAT UR-MCNC: 52 MG/DL
DEPRECATED HCO3 PLAS-SCNC: 32 MMOL/L (ref 22–29)
EOSINOPHIL # BLD AUTO: 0 10E3/UL (ref 0–0.7)
EOSINOPHIL NFR BLD AUTO: 1 %
ERYTHROCYTE [DISTWIDTH] IN BLOOD BY AUTOMATED COUNT: 13 % (ref 10–15)
GFR SERPL CREATININE-BSD FRML MDRD: 42 ML/MIN/1.73M2
GLUCOSE SERPL-MCNC: 100 MG/DL (ref 70–99)
HCT VFR BLD AUTO: 35.1 % (ref 35–47)
HGB BLD-MCNC: 11 G/DL (ref 11.7–15.7)
IMM GRANULOCYTES # BLD: 0 10E3/UL
IMM GRANULOCYTES NFR BLD: 0 %
LYMPHOCYTES # BLD AUTO: 0.7 10E3/UL (ref 0.8–5.3)
LYMPHOCYTES NFR BLD AUTO: 8 %
MAGNESIUM SERPL-MCNC: 2.1 MG/DL (ref 1.7–2.3)
MCH RBC QN AUTO: 32.4 PG (ref 26.5–33)
MCHC RBC AUTO-ENTMCNC: 31.3 G/DL (ref 31.5–36.5)
MCV RBC AUTO: 103 FL (ref 78–100)
MICROALBUMIN UR-MCNC: 13 MG/L
MICROALBUMIN/CREAT UR: 25 MG/G CR (ref 0–25)
MONOCYTES # BLD AUTO: 0.8 10E3/UL (ref 0–1.3)
MONOCYTES NFR BLD AUTO: 9 %
NEUTROPHILS # BLD AUTO: 7.2 10E3/UL (ref 1.6–8.3)
NEUTROPHILS NFR BLD AUTO: 82 %
NRBC # BLD AUTO: 0 10E3/UL
NRBC BLD AUTO-RTO: 0 /100
PHOSPHATE SERPL-MCNC: 2.6 MG/DL (ref 2.5–4.5)
PLATELET # BLD AUTO: 181 10E3/UL (ref 150–450)
POTASSIUM SERPL-SCNC: 3.9 MMOL/L (ref 3.4–5.3)
RBC # BLD AUTO: 3.4 10E6/UL (ref 3.8–5.2)
SODIUM SERPL-SCNC: 139 MMOL/L (ref 136–145)
WBC # BLD AUTO: 8.8 10E3/UL (ref 4–11)

## 2023-06-30 PROCEDURE — 83735 ASSAY OF MAGNESIUM: CPT

## 2023-06-30 PROCEDURE — 82310 ASSAY OF CALCIUM: CPT

## 2023-06-30 PROCEDURE — 36415 COLL VENOUS BLD VENIPUNCTURE: CPT

## 2023-06-30 PROCEDURE — 82570 ASSAY OF URINE CREATININE: CPT

## 2023-06-30 PROCEDURE — 85025 COMPLETE CBC W/AUTO DIFF WBC: CPT

## 2023-06-30 PROCEDURE — 84100 ASSAY OF PHOSPHORUS: CPT

## 2023-07-07 ENCOUNTER — LAB (OUTPATIENT)
Dept: LAB | Facility: CLINIC | Age: 53
End: 2023-07-07
Payer: COMMERCIAL

## 2023-07-07 DIAGNOSIS — Z79.899 HIGH RISK MEDICATION USE: ICD-10-CM

## 2023-07-07 DIAGNOSIS — D84.9 IMMUNODEFICIENCY (H): ICD-10-CM

## 2023-07-07 DIAGNOSIS — Z48.22 ENCOUNTER FOR AFTERCARE FOLLOWING KIDNEY TRANSPLANT: ICD-10-CM

## 2023-07-07 LAB
ANION GAP SERPL CALCULATED.3IONS-SCNC: 11 MMOL/L (ref 7–15)
BASOPHILS # BLD AUTO: 0 10E3/UL (ref 0–0.2)
BASOPHILS NFR BLD AUTO: 0 %
BUN SERPL-MCNC: 34.4 MG/DL (ref 6–20)
CALCIUM SERPL-MCNC: 10.5 MG/DL (ref 8.6–10)
CHLORIDE SERPL-SCNC: 103 MMOL/L (ref 98–107)
CREAT SERPL-MCNC: 1.38 MG/DL (ref 0.51–0.95)
CREAT UR-MCNC: 103 MG/DL
DEPRECATED HCO3 PLAS-SCNC: 28 MMOL/L (ref 22–29)
EOSINOPHIL # BLD AUTO: 0.1 10E3/UL (ref 0–0.7)
EOSINOPHIL NFR BLD AUTO: 1 %
ERYTHROCYTE [DISTWIDTH] IN BLOOD BY AUTOMATED COUNT: 13.1 % (ref 10–15)
GFR SERPL CREATININE-BSD FRML MDRD: 46 ML/MIN/1.73M2
GLUCOSE SERPL-MCNC: 146 MG/DL (ref 70–99)
HCT VFR BLD AUTO: 36.8 % (ref 35–47)
HGB BLD-MCNC: 11.2 G/DL (ref 11.7–15.7)
IMM GRANULOCYTES # BLD: 0 10E3/UL
IMM GRANULOCYTES NFR BLD: 0 %
LYMPHOCYTES # BLD AUTO: 0.7 10E3/UL (ref 0.8–5.3)
LYMPHOCYTES NFR BLD AUTO: 8 %
MAGNESIUM SERPL-MCNC: 2.2 MG/DL (ref 1.7–2.3)
MCH RBC QN AUTO: 32.1 PG (ref 26.5–33)
MCHC RBC AUTO-ENTMCNC: 30.4 G/DL (ref 31.5–36.5)
MCV RBC AUTO: 105 FL (ref 78–100)
MICROALBUMIN UR-MCNC: 25.3 MG/L
MICROALBUMIN/CREAT UR: 24.56 MG/G CR (ref 0–25)
MONOCYTES # BLD AUTO: 0.6 10E3/UL (ref 0–1.3)
MONOCYTES NFR BLD AUTO: 7 %
NEUTROPHILS # BLD AUTO: 6.8 10E3/UL (ref 1.6–8.3)
NEUTROPHILS NFR BLD AUTO: 84 %
NRBC # BLD AUTO: 0 10E3/UL
NRBC BLD AUTO-RTO: 0 /100
PHOSPHATE SERPL-MCNC: 2.1 MG/DL (ref 2.5–4.5)
PLATELET # BLD AUTO: 189 10E3/UL (ref 150–450)
POTASSIUM SERPL-SCNC: 3.8 MMOL/L (ref 3.4–5.3)
RBC # BLD AUTO: 3.49 10E6/UL (ref 3.8–5.2)
SODIUM SERPL-SCNC: 142 MMOL/L (ref 136–145)
WBC # BLD AUTO: 8.1 10E3/UL (ref 4–11)

## 2023-07-07 PROCEDURE — 36415 COLL VENOUS BLD VENIPUNCTURE: CPT

## 2023-07-07 PROCEDURE — 85025 COMPLETE CBC W/AUTO DIFF WBC: CPT

## 2023-07-07 PROCEDURE — 84100 ASSAY OF PHOSPHORUS: CPT

## 2023-07-07 PROCEDURE — 83735 ASSAY OF MAGNESIUM: CPT

## 2023-07-07 PROCEDURE — 82570 ASSAY OF URINE CREATININE: CPT

## 2023-07-07 PROCEDURE — 80048 BASIC METABOLIC PNL TOTAL CA: CPT

## 2023-07-14 ENCOUNTER — LAB (OUTPATIENT)
Dept: LAB | Facility: CLINIC | Age: 53
End: 2023-07-14
Payer: COMMERCIAL

## 2023-07-14 ENCOUNTER — APPOINTMENT (OUTPATIENT)
Dept: LAB | Facility: CLINIC | Age: 53
End: 2023-07-14
Payer: COMMERCIAL

## 2023-07-14 DIAGNOSIS — Z79.899 HIGH RISK MEDICATION USE: ICD-10-CM

## 2023-07-14 DIAGNOSIS — D84.9 IMMUNODEFICIENCY (H): ICD-10-CM

## 2023-07-14 DIAGNOSIS — Z48.22 ENCOUNTER FOR AFTERCARE FOLLOWING KIDNEY TRANSPLANT: ICD-10-CM

## 2023-07-14 LAB
ANION GAP SERPL CALCULATED.3IONS-SCNC: 11 MMOL/L (ref 7–15)
BASOPHILS # BLD AUTO: 0 10E3/UL (ref 0–0.2)
BASOPHILS NFR BLD AUTO: 0 %
BUN SERPL-MCNC: 24.3 MG/DL (ref 6–20)
CALCIUM SERPL-MCNC: 10.5 MG/DL (ref 8.6–10)
CHLORIDE SERPL-SCNC: 102 MMOL/L (ref 98–107)
CREAT SERPL-MCNC: 1.33 MG/DL (ref 0.51–0.95)
CREAT UR-MCNC: 69.9 MG/DL
DEPRECATED HCO3 PLAS-SCNC: 26 MMOL/L (ref 22–29)
EOSINOPHIL # BLD AUTO: 0 10E3/UL (ref 0–0.7)
EOSINOPHIL NFR BLD AUTO: 1 %
ERYTHROCYTE [DISTWIDTH] IN BLOOD BY AUTOMATED COUNT: 13 % (ref 10–15)
GFR SERPL CREATININE-BSD FRML MDRD: 48 ML/MIN/1.73M2
GLUCOSE SERPL-MCNC: 120 MG/DL (ref 70–99)
HCT VFR BLD AUTO: 33.5 % (ref 35–47)
HGB BLD-MCNC: 10.3 G/DL (ref 11.7–15.7)
IMM GRANULOCYTES # BLD: 0 10E3/UL
IMM GRANULOCYTES NFR BLD: 0 %
LYMPHOCYTES # BLD AUTO: 0.7 10E3/UL (ref 0.8–5.3)
LYMPHOCYTES NFR BLD AUTO: 8 %
MAGNESIUM SERPL-MCNC: 2.3 MG/DL (ref 1.7–2.3)
MCH RBC QN AUTO: 31.7 PG (ref 26.5–33)
MCHC RBC AUTO-ENTMCNC: 30.7 G/DL (ref 31.5–36.5)
MCV RBC AUTO: 103 FL (ref 78–100)
MICROALBUMIN UR-MCNC: <12 MG/L
MICROALBUMIN/CREAT UR: NORMAL MG/G{CREAT}
MONOCYTES # BLD AUTO: 0.6 10E3/UL (ref 0–1.3)
MONOCYTES NFR BLD AUTO: 8 %
NEUTROPHILS # BLD AUTO: 6.9 10E3/UL (ref 1.6–8.3)
NEUTROPHILS NFR BLD AUTO: 83 %
NRBC # BLD AUTO: 0 10E3/UL
NRBC BLD AUTO-RTO: 0 /100
PHOSPHATE SERPL-MCNC: 2.4 MG/DL (ref 2.5–4.5)
PLATELET # BLD AUTO: 195 10E3/UL (ref 150–450)
POTASSIUM SERPL-SCNC: 4 MMOL/L (ref 3.4–5.3)
RBC # BLD AUTO: 3.25 10E6/UL (ref 3.8–5.2)
SODIUM SERPL-SCNC: 139 MMOL/L (ref 136–145)
WBC # BLD AUTO: 8.2 10E3/UL (ref 4–11)

## 2023-07-14 PROCEDURE — 82570 ASSAY OF URINE CREATININE: CPT

## 2023-07-14 PROCEDURE — 36415 COLL VENOUS BLD VENIPUNCTURE: CPT

## 2023-07-14 PROCEDURE — 80048 BASIC METABOLIC PNL TOTAL CA: CPT

## 2023-07-14 PROCEDURE — 83735 ASSAY OF MAGNESIUM: CPT

## 2023-07-14 PROCEDURE — 85025 COMPLETE CBC W/AUTO DIFF WBC: CPT

## 2023-07-14 PROCEDURE — 84100 ASSAY OF PHOSPHORUS: CPT

## 2023-07-21 ENCOUNTER — LAB (OUTPATIENT)
Dept: LAB | Facility: CLINIC | Age: 53
End: 2023-07-21
Payer: COMMERCIAL

## 2023-07-21 DIAGNOSIS — Z48.22 ENCOUNTER FOR AFTERCARE FOLLOWING KIDNEY TRANSPLANT: ICD-10-CM

## 2023-07-21 DIAGNOSIS — D84.9 IMMUNODEFICIENCY (H): ICD-10-CM

## 2023-07-21 DIAGNOSIS — Z79.899 HIGH RISK MEDICATION USE: ICD-10-CM

## 2023-07-21 LAB
ANION GAP SERPL CALCULATED.3IONS-SCNC: 10 MMOL/L (ref 7–15)
BASOPHILS # BLD AUTO: 0 10E3/UL (ref 0–0.2)
BASOPHILS NFR BLD AUTO: 0 %
BUN SERPL-MCNC: 23.5 MG/DL (ref 6–20)
CALCIUM SERPL-MCNC: 10.5 MG/DL (ref 8.6–10)
CHLORIDE SERPL-SCNC: 100 MMOL/L (ref 98–107)
CREAT SERPL-MCNC: 1.43 MG/DL (ref 0.51–0.95)
CREAT UR-MCNC: 81.3 MG/DL
DEPRECATED HCO3 PLAS-SCNC: 33 MMOL/L (ref 22–29)
EOSINOPHIL # BLD AUTO: 0.1 10E3/UL (ref 0–0.7)
EOSINOPHIL NFR BLD AUTO: 1 %
ERYTHROCYTE [DISTWIDTH] IN BLOOD BY AUTOMATED COUNT: 12.9 % (ref 10–15)
GFR SERPL CREATININE-BSD FRML MDRD: 44 ML/MIN/1.73M2
GLUCOSE SERPL-MCNC: 112 MG/DL (ref 70–99)
HCT VFR BLD AUTO: 34.2 % (ref 35–47)
HGB BLD-MCNC: 10.4 G/DL (ref 11.7–15.7)
IMM GRANULOCYTES # BLD: 0 10E3/UL
IMM GRANULOCYTES NFR BLD: 0 %
LYMPHOCYTES # BLD AUTO: 0.7 10E3/UL (ref 0.8–5.3)
LYMPHOCYTES NFR BLD AUTO: 7 %
MAGNESIUM SERPL-MCNC: 2.3 MG/DL (ref 1.7–2.3)
MCH RBC QN AUTO: 31.9 PG (ref 26.5–33)
MCHC RBC AUTO-ENTMCNC: 30.4 G/DL (ref 31.5–36.5)
MCV RBC AUTO: 105 FL (ref 78–100)
MICROALBUMIN UR-MCNC: 15.8 MG/L
MICROALBUMIN/CREAT UR: 19.43 MG/G CR (ref 0–25)
MONOCYTES # BLD AUTO: 0.6 10E3/UL (ref 0–1.3)
MONOCYTES NFR BLD AUTO: 6 %
NEUTROPHILS # BLD AUTO: 7.8 10E3/UL (ref 1.6–8.3)
NEUTROPHILS NFR BLD AUTO: 86 %
NRBC # BLD AUTO: 0 10E3/UL
NRBC BLD AUTO-RTO: 0 /100
PHOSPHATE SERPL-MCNC: 2.9 MG/DL (ref 2.5–4.5)
PLATELET # BLD AUTO: 195 10E3/UL (ref 150–450)
POTASSIUM SERPL-SCNC: 3.8 MMOL/L (ref 3.4–5.3)
RBC # BLD AUTO: 3.26 10E6/UL (ref 3.8–5.2)
SODIUM SERPL-SCNC: 143 MMOL/L (ref 136–145)
WBC # BLD AUTO: 9.2 10E3/UL (ref 4–11)

## 2023-07-21 PROCEDURE — 80048 BASIC METABOLIC PNL TOTAL CA: CPT

## 2023-07-21 PROCEDURE — 82570 ASSAY OF URINE CREATININE: CPT

## 2023-07-21 PROCEDURE — 36415 COLL VENOUS BLD VENIPUNCTURE: CPT

## 2023-07-21 PROCEDURE — 84100 ASSAY OF PHOSPHORUS: CPT

## 2023-07-21 PROCEDURE — 83735 ASSAY OF MAGNESIUM: CPT

## 2023-07-21 PROCEDURE — 85025 COMPLETE CBC W/AUTO DIFF WBC: CPT

## 2023-08-02 ENCOUNTER — LAB (OUTPATIENT)
Dept: LAB | Facility: CLINIC | Age: 53
End: 2023-08-02
Payer: COMMERCIAL

## 2023-08-02 DIAGNOSIS — D84.9 IMMUNODEFICIENCY (H): ICD-10-CM

## 2023-08-02 DIAGNOSIS — Z79.899 HIGH RISK MEDICATION USE: ICD-10-CM

## 2023-08-02 DIAGNOSIS — Z48.22 ENCOUNTER FOR AFTERCARE FOLLOWING KIDNEY TRANSPLANT: ICD-10-CM

## 2023-08-02 LAB
ANION GAP SERPL CALCULATED.3IONS-SCNC: 14 MMOL/L (ref 7–15)
BASOPHILS # BLD AUTO: 0 10E3/UL (ref 0–0.2)
BASOPHILS NFR BLD AUTO: 0 %
BUN SERPL-MCNC: 24.7 MG/DL (ref 6–20)
CALCIUM SERPL-MCNC: 10.1 MG/DL (ref 8.6–10)
CHLORIDE SERPL-SCNC: 102 MMOL/L (ref 98–107)
CREAT SERPL-MCNC: 1.46 MG/DL (ref 0.51–0.95)
CREAT UR-MCNC: 71.6 MG/DL
DEPRECATED HCO3 PLAS-SCNC: 28 MMOL/L (ref 22–29)
EOSINOPHIL # BLD AUTO: 0.1 10E3/UL (ref 0–0.7)
EOSINOPHIL NFR BLD AUTO: 1 %
ERYTHROCYTE [DISTWIDTH] IN BLOOD BY AUTOMATED COUNT: 12.9 % (ref 10–15)
GFR SERPL CREATININE-BSD FRML MDRD: 43 ML/MIN/1.73M2
GLUCOSE SERPL-MCNC: 94 MG/DL (ref 70–99)
HCT VFR BLD AUTO: 32.6 % (ref 35–47)
HGB BLD-MCNC: 10.1 G/DL (ref 11.7–15.7)
IMM GRANULOCYTES # BLD: 0 10E3/UL
IMM GRANULOCYTES NFR BLD: 0 %
LYMPHOCYTES # BLD AUTO: 1 10E3/UL (ref 0.8–5.3)
LYMPHOCYTES NFR BLD AUTO: 18 %
MAGNESIUM SERPL-MCNC: 1.9 MG/DL (ref 1.7–2.3)
MCH RBC QN AUTO: 31.6 PG (ref 26.5–33)
MCHC RBC AUTO-ENTMCNC: 31 G/DL (ref 31.5–36.5)
MCV RBC AUTO: 102 FL (ref 78–100)
MICROALBUMIN UR-MCNC: 93.8 MG/L
MICROALBUMIN/CREAT UR: 131.01 MG/G CR (ref 0–25)
MONOCYTES # BLD AUTO: 0.7 10E3/UL (ref 0–1.3)
MONOCYTES NFR BLD AUTO: 11 %
NEUTROPHILS # BLD AUTO: 4.1 10E3/UL (ref 1.6–8.3)
NEUTROPHILS NFR BLD AUTO: 70 %
NRBC # BLD AUTO: 0 10E3/UL
NRBC BLD AUTO-RTO: 0 /100
PHOSPHATE SERPL-MCNC: 3 MG/DL (ref 2.5–4.5)
PLATELET # BLD AUTO: 181 10E3/UL (ref 150–450)
POTASSIUM SERPL-SCNC: 3.2 MMOL/L (ref 3.4–5.3)
RBC # BLD AUTO: 3.2 10E6/UL (ref 3.8–5.2)
SODIUM SERPL-SCNC: 144 MMOL/L (ref 136–145)
WBC # BLD AUTO: 5.9 10E3/UL (ref 4–11)

## 2023-08-02 PROCEDURE — 85025 COMPLETE CBC W/AUTO DIFF WBC: CPT

## 2023-08-02 PROCEDURE — 84100 ASSAY OF PHOSPHORUS: CPT

## 2023-08-02 PROCEDURE — 80048 BASIC METABOLIC PNL TOTAL CA: CPT

## 2023-08-02 PROCEDURE — 83735 ASSAY OF MAGNESIUM: CPT

## 2023-08-02 PROCEDURE — 82570 ASSAY OF URINE CREATININE: CPT

## 2023-08-02 PROCEDURE — 36415 COLL VENOUS BLD VENIPUNCTURE: CPT

## 2023-08-03 ENCOUNTER — TELEPHONE (OUTPATIENT)
Dept: INTERNAL MEDICINE | Facility: CLINIC | Age: 53
End: 2023-08-03
Payer: COMMERCIAL

## 2023-08-04 ENCOUNTER — TELEPHONE (OUTPATIENT)
Dept: INTERNAL MEDICINE | Facility: CLINIC | Age: 53
End: 2023-08-04

## 2023-08-04 ENCOUNTER — OFFICE VISIT (OUTPATIENT)
Dept: INTERNAL MEDICINE | Facility: CLINIC | Age: 53
End: 2023-08-04
Payer: COMMERCIAL

## 2023-08-04 VITALS
RESPIRATION RATE: 16 BRPM | BODY MASS INDEX: 37.81 KG/M2 | OXYGEN SATURATION: 99 % | TEMPERATURE: 98.3 F | HEART RATE: 66 BPM | WEIGHT: 240.9 LBS | HEIGHT: 67 IN | DIASTOLIC BLOOD PRESSURE: 60 MMHG | SYSTOLIC BLOOD PRESSURE: 101 MMHG

## 2023-08-04 DIAGNOSIS — R31.9 HEMATURIA, UNSPECIFIED TYPE: Primary | ICD-10-CM

## 2023-08-04 DIAGNOSIS — D84.821 IMMUNODEFICIENCY DUE TO DRUGS (H): ICD-10-CM

## 2023-08-04 DIAGNOSIS — Z94.0 STATUS POST KIDNEY TRANSPLANT: ICD-10-CM

## 2023-08-04 DIAGNOSIS — Z79.899 IMMUNODEFICIENCY DUE TO DRUGS (H): ICD-10-CM

## 2023-08-04 DIAGNOSIS — I05.9 MITRAL VALVE DISEASE: ICD-10-CM

## 2023-08-04 DIAGNOSIS — E87.6 HYPOKALEMIA: ICD-10-CM

## 2023-08-04 LAB
BACTERIA #/AREA URNS HPF: ABNORMAL /HPF
RBC #/AREA URNS AUTO: >100 /HPF
SQUAMOUS #/AREA URNS AUTO: ABNORMAL /LPF
WBC #/AREA URNS AUTO: ABNORMAL /HPF

## 2023-08-04 PROCEDURE — 81015 MICROSCOPIC EXAM OF URINE: CPT | Performed by: INTERNAL MEDICINE

## 2023-08-04 PROCEDURE — 87186 SC STD MICRODIL/AGAR DIL: CPT | Performed by: INTERNAL MEDICINE

## 2023-08-04 PROCEDURE — 87086 URINE CULTURE/COLONY COUNT: CPT | Performed by: INTERNAL MEDICINE

## 2023-08-04 PROCEDURE — 99214 OFFICE O/P EST MOD 30 MIN: CPT | Performed by: INTERNAL MEDICINE

## 2023-08-04 PROCEDURE — 87088 URINE BACTERIA CULTURE: CPT | Performed by: INTERNAL MEDICINE

## 2023-08-04 RX ORDER — POTASSIUM CHLORIDE 1500 MG/1
20 TABLET, EXTENDED RELEASE ORAL DAILY
COMMUNITY
Start: 2023-08-04 | End: 2024-03-11

## 2023-08-04 RX ORDER — CIPROFLOXACIN 500 MG/1
500 TABLET, FILM COATED ORAL 2 TIMES DAILY
Qty: 14 TABLET | Refills: 0 | Status: SHIPPED | OUTPATIENT
Start: 2023-08-04 | End: 2023-08-07

## 2023-08-04 ASSESSMENT — ASTHMA QUESTIONNAIRES: ACT_TOTALSCORE: 25

## 2023-08-04 NOTE — PATIENT INSTRUCTIONS
We will check urine for infection.    2. Touch base with transplant team/urology : stent may need to be changed eery 2 months since you are running into problems every 2 months.    3. Repeat kidney function and potassium level next week, and continue weekly to make sure potassium not too high/low    4. Get covid and flu shot in the fall.

## 2023-08-04 NOTE — TELEPHONE ENCOUNTER
Spoke with pt. , urinalysis does show microscopic red cells and inflammatory cells.  Urine culture may take another 2 days to come back.  Since she is on immunosuppressive medications, I will go ahead and send antibiotic in and will let her know if urine culture does not grow any bacteria.  She should touch base with her transplant team next week.

## 2023-08-04 NOTE — PROGRESS NOTES
"  Assessment & Plan     Hematuria, patient is a kidney transplant recipient with history of high-grade stricture and ureteral stent.  Urinalysis today looks inflamed.  Discussed that it might be due to irritation due to ureteral stent versus infection.  Given her immunosuppression, we will restart her on antibiotic and follow-up on urine culture.  Discussed that she should touch base with her transplant team on Monday to see has a 1 to manage her stent, it may need to be exchanged.  It seems that she is running in trouble every 2 months if the stent is not exchanged.  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  - UA with Microscopic reflex to Culture - lab collect; Future  - UA Microscopic with Reflex to Culture  - Urine Culture    Hypokalemia  She is on potassium supplement as prescribed by her transplant team and has weekly lab draws for creatinine and potassium level  - potassium chloride ER (KLOR-CON M) 20 MEQ CR tablet; Take 1 tablet (20 mEq) by mouth daily    Mitral valve disease  History of severe mitral valve regurgitation however most recent echo showed improvement in regurgitation to Midrid.  Patient has improved her diet and lost some weight.  56}     Nicotine/Tobacco Cessation:  She reports that she has been smoking cigarettes, cigarettes, and cigarettes. She has a 6.00 pack-year smoking history. She has never used smokeless tobacco.          BMI:   Estimated body mass index is 37.73 kg/m  as calculated from the following:    Height as of this encounter: 1.702 m (5' 7\").    " Weight as of this encounter: 109.3 kg (240 lb 14.4 oz).     Betty Vazquez MD  [facility]    Subjective   Alla is a 52 year old, presenting for the following health issues:  office visit and Recheck Medication (Pt reports that she is here to discuss kidney transplant update.)      8/4/2023     3:11 PM   Additional Questions   Roomed by cheyanne   Accompanied by alone         8/4/2023     3:11 PM   Patient Reported Additional Medications   Patient reports taking the following new medications none     Alla is a 52 y.o. 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  She is currently here for acute visit due to episode of hematuria.    5 days ago she went to a water park with her family.  She denies any falls or injury but the following day developed dark looking urine and intermittent red urine on and off.  She does have her kidney function checked weekly and she has had labs done 2 days ago which showed creatinine at baseline at 1.46.  Her potassium was low at 3.2 and her transplant team prescribed her potassium supplement which she is currently taking.  She is also on torsemide.    Currently gross hematuria is on and off, she denies any abdominal pain, no fevers chills nausea or vomiting.  Because she is immunosuppressed she is on chronic Bactrim.    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  Subsequently in June she developed infection and her nephrostomy tube was sticking out and stent was changed on June 1.  Currently it has been 2 months since her last stent exchange and  "she is having recurrent symptoms.    History of Present Illness       CKD: She is not using over the counter pain medicine.     Reason for visit:  [place] advised to come see my promary    She eats 2-3 servings of fruits and vegetables daily.She consumes 1 sweetened beverage(s) daily.She exercises with enough effort to increase her heart rate 60 or more minutes per day.  She exercises with enough effort to increase her heart rate 5 days per week.   She is taking medications regularly.       Pt reports that she is here to discuss recent kidney transplant.    Review of Systems   As above      Objective    /60 (BP Location: Left arm, Patient Position: Sitting, Cuff Size: Adult Large)   Pulse 66   Temp 98.3  F (36.8  C) (Tympanic)   Resp 16   Ht 1.702 m (5' 7\")   Wt 109.3 kg (240 lb 14.4 oz)   LMP  (LMP Unknown)   SpO2 99%   BMI 37.73 kg/m    Body mass index is 37.73 kg/m .  Physical Exam   General: well appearing female, alert and oriented x3  EYES: Eyelids, conjunctiva, and sclera were normal. Pupils were normal.   HEAD, EARS, NOSE, MOUTH, AND THROAT: no cervical LAD, no thyromegaly or nodules appreciated. TMs are visualized and normal, oropharynx is clear.  RESPIRATORY: respirations non labored, CTA bl, no wheezes, rales, no forced expiratory wheezing.  CARDIOVASCULAR: Heart rate and rhythm were normal. No murmurs, rubs,gallops. There was no peripheral edema. No carotid bruits.  GASTROINTESTINAL: Positive bowel sounds, abdomen is soft, non tender, non distended.     MUSCULOSKELETAL: Muscle mass was normal for age. No joint synovitis or deformity.  LYMPHATIC: There were no enlarged nodes palpable.  SKIN/HAIR/NAILS: Skin color was normal.  No rashes.  NEUROLOGIC: The patient was alert and oriented.  Speech was normal.  There is no facial asymmetry.   PSYCHIATRIC:  Mood and affect were normal.                "

## 2023-08-07 ENCOUNTER — TELEPHONE (OUTPATIENT)
Dept: INTERNAL MEDICINE | Facility: CLINIC | Age: 53
End: 2023-08-07
Payer: COMMERCIAL

## 2023-08-07 DIAGNOSIS — R31.9 HEMATURIA, UNSPECIFIED TYPE: Primary | ICD-10-CM

## 2023-08-07 DIAGNOSIS — N30.01 ACUTE CYSTITIS WITH HEMATURIA: ICD-10-CM

## 2023-08-07 LAB — BACTERIA UR CULT: ABNORMAL

## 2023-08-07 RX ORDER — NITROFURANTOIN 25; 75 MG/1; MG/1
100 CAPSULE ORAL 2 TIMES DAILY
Qty: 14 CAPSULE | Refills: 0 | Status: SHIPPED | OUTPATIENT
Start: 2023-08-07 | End: 2023-08-14

## 2023-08-07 NOTE — TELEPHONE ENCOUNTER
Spoke with pt.  Urine culture is growing Staph epidermidis.  Since Alla is immunosuppressed, cannot presume that it is a contamination.  Continues to have intermittent hematuria.  Current bacteria is resistant to Cipro.    Asked to stop Cipro.  Will start on nitrofurantoin.  She will get in contact with her transplant team at [facility] to see if they also would like her to have urinary stent exchanged which might be the source of bleeding and possible infection.

## 2023-08-10 ENCOUNTER — LAB (OUTPATIENT)
Dept: LAB | Facility: CLINIC | Age: 53
End: 2023-08-10
Payer: COMMERCIAL

## 2023-08-10 DIAGNOSIS — Z48.22 ENCOUNTER FOR AFTERCARE FOLLOWING KIDNEY TRANSPLANT: ICD-10-CM

## 2023-08-10 DIAGNOSIS — Z79.899 HIGH RISK MEDICATION USE: ICD-10-CM

## 2023-08-10 DIAGNOSIS — D84.9 IMMUNODEFICIENCY (H): ICD-10-CM

## 2023-08-10 LAB
ANION GAP SERPL CALCULATED.3IONS-SCNC: 8 MMOL/L (ref 7–15)
BASOPHILS # BLD AUTO: 0 10E3/UL (ref 0–0.2)
BASOPHILS NFR BLD AUTO: 0 %
BUN SERPL-MCNC: 24.4 MG/DL (ref 6–20)
CALCIUM SERPL-MCNC: 10.4 MG/DL (ref 8.6–10)
CHLORIDE SERPL-SCNC: 104 MMOL/L (ref 98–107)
CREAT SERPL-MCNC: 1.45 MG/DL (ref 0.51–0.95)
CREAT UR-MCNC: 116 MG/DL
DEPRECATED HCO3 PLAS-SCNC: 32 MMOL/L (ref 22–29)
EOSINOPHIL # BLD AUTO: 0 10E3/UL (ref 0–0.7)
EOSINOPHIL NFR BLD AUTO: 1 %
ERYTHROCYTE [DISTWIDTH] IN BLOOD BY AUTOMATED COUNT: 13.1 % (ref 10–15)
GFR SERPL CREATININE-BSD FRML MDRD: 43 ML/MIN/1.73M2
GLUCOSE SERPL-MCNC: 91 MG/DL (ref 70–99)
HCT VFR BLD AUTO: 34.5 % (ref 35–47)
HGB BLD-MCNC: 10.5 G/DL (ref 11.7–15.7)
IMM GRANULOCYTES # BLD: 0 10E3/UL
IMM GRANULOCYTES NFR BLD: 0 %
LYMPHOCYTES # BLD AUTO: 0.5 10E3/UL (ref 0.8–5.3)
LYMPHOCYTES NFR BLD AUTO: 9 %
MAGNESIUM SERPL-MCNC: 2.2 MG/DL (ref 1.7–2.3)
MCH RBC QN AUTO: 32.1 PG (ref 26.5–33)
MCHC RBC AUTO-ENTMCNC: 30.4 G/DL (ref 31.5–36.5)
MCV RBC AUTO: 106 FL (ref 78–100)
MICROALBUMIN UR-MCNC: 37.7 MG/L
MICROALBUMIN/CREAT UR: 32.5 MG/G CR (ref 0–25)
MONOCYTES # BLD AUTO: 0.6 10E3/UL (ref 0–1.3)
MONOCYTES NFR BLD AUTO: 11 %
NEUTROPHILS # BLD AUTO: 4.7 10E3/UL (ref 1.6–8.3)
NEUTROPHILS NFR BLD AUTO: 79 %
NRBC # BLD AUTO: 0 10E3/UL
NRBC BLD AUTO-RTO: 0 /100
PHOSPHATE SERPL-MCNC: 2.2 MG/DL (ref 2.5–4.5)
PLATELET # BLD AUTO: 181 10E3/UL (ref 150–450)
POTASSIUM SERPL-SCNC: 3.8 MMOL/L (ref 3.4–5.3)
RBC # BLD AUTO: 3.27 10E6/UL (ref 3.8–5.2)
SODIUM SERPL-SCNC: 144 MMOL/L (ref 136–145)
WBC # BLD AUTO: 5.9 10E3/UL (ref 4–11)

## 2023-08-10 PROCEDURE — 84100 ASSAY OF PHOSPHORUS: CPT

## 2023-08-10 PROCEDURE — 83735 ASSAY OF MAGNESIUM: CPT

## 2023-08-10 PROCEDURE — 85025 COMPLETE CBC W/AUTO DIFF WBC: CPT

## 2023-08-10 PROCEDURE — 36415 COLL VENOUS BLD VENIPUNCTURE: CPT

## 2023-08-10 PROCEDURE — 82570 ASSAY OF URINE CREATININE: CPT

## 2023-08-10 PROCEDURE — 80048 BASIC METABOLIC PNL TOTAL CA: CPT

## 2023-08-18 ENCOUNTER — LAB (OUTPATIENT)
Dept: LAB | Facility: CLINIC | Age: 53
End: 2023-08-18
Payer: COMMERCIAL

## 2023-08-18 DIAGNOSIS — Z48.22 ENCOUNTER FOR AFTERCARE FOLLOWING KIDNEY TRANSPLANT: ICD-10-CM

## 2023-08-18 DIAGNOSIS — D84.9 IMMUNODEFICIENCY (H): ICD-10-CM

## 2023-08-18 DIAGNOSIS — Z79.899 HIGH RISK MEDICATION USE: ICD-10-CM

## 2023-08-18 LAB
ANION GAP SERPL CALCULATED.3IONS-SCNC: 11 MMOL/L (ref 7–15)
BASOPHILS # BLD AUTO: 0 10E3/UL (ref 0–0.2)
BASOPHILS NFR BLD AUTO: 0 %
BUN SERPL-MCNC: 22.9 MG/DL (ref 6–20)
CALCIUM SERPL-MCNC: 10.4 MG/DL (ref 8.6–10)
CHLORIDE SERPL-SCNC: 102 MMOL/L (ref 98–107)
CREAT SERPL-MCNC: 1.38 MG/DL (ref 0.51–0.95)
CREAT UR-MCNC: 117 MG/DL
DEPRECATED HCO3 PLAS-SCNC: 29 MMOL/L (ref 22–29)
EOSINOPHIL # BLD AUTO: 0.1 10E3/UL (ref 0–0.7)
EOSINOPHIL NFR BLD AUTO: 1 %
ERYTHROCYTE [DISTWIDTH] IN BLOOD BY AUTOMATED COUNT: 13.1 % (ref 10–15)
GFR SERPL CREATININE-BSD FRML MDRD: 46 ML/MIN/1.73M2
GLUCOSE SERPL-MCNC: 114 MG/DL (ref 70–99)
HCT VFR BLD AUTO: 33.2 % (ref 35–47)
HGB BLD-MCNC: 10.2 G/DL (ref 11.7–15.7)
IMM GRANULOCYTES # BLD: 0 10E3/UL
IMM GRANULOCYTES NFR BLD: 0 %
LYMPHOCYTES # BLD AUTO: 0.7 10E3/UL (ref 0.8–5.3)
LYMPHOCYTES NFR BLD AUTO: 13 %
MAGNESIUM SERPL-MCNC: 2.1 MG/DL (ref 1.7–2.3)
MCH RBC QN AUTO: 31.9 PG (ref 26.5–33)
MCHC RBC AUTO-ENTMCNC: 30.7 G/DL (ref 31.5–36.5)
MCV RBC AUTO: 104 FL (ref 78–100)
MICROALBUMIN UR-MCNC: 28.8 MG/L
MICROALBUMIN/CREAT UR: 24.62 MG/G CR (ref 0–25)
MONOCYTES # BLD AUTO: 0.5 10E3/UL (ref 0–1.3)
MONOCYTES NFR BLD AUTO: 11 %
NEUTROPHILS # BLD AUTO: 3.9 10E3/UL (ref 1.6–8.3)
NEUTROPHILS NFR BLD AUTO: 75 %
NRBC # BLD AUTO: 0 10E3/UL
NRBC BLD AUTO-RTO: 0 /100
PHOSPHATE SERPL-MCNC: 2.9 MG/DL (ref 2.5–4.5)
PLATELET # BLD AUTO: 155 10E3/UL (ref 150–450)
POTASSIUM SERPL-SCNC: 3.5 MMOL/L (ref 3.4–5.3)
RBC # BLD AUTO: 3.2 10E6/UL (ref 3.8–5.2)
SODIUM SERPL-SCNC: 142 MMOL/L (ref 136–145)
WBC # BLD AUTO: 5.1 10E3/UL (ref 4–11)

## 2023-08-18 PROCEDURE — 82570 ASSAY OF URINE CREATININE: CPT

## 2023-08-18 PROCEDURE — 36415 COLL VENOUS BLD VENIPUNCTURE: CPT

## 2023-08-18 PROCEDURE — 83735 ASSAY OF MAGNESIUM: CPT

## 2023-08-18 PROCEDURE — 80048 BASIC METABOLIC PNL TOTAL CA: CPT

## 2023-08-18 PROCEDURE — 84100 ASSAY OF PHOSPHORUS: CPT

## 2023-08-18 PROCEDURE — 85004 AUTOMATED DIFF WBC COUNT: CPT

## 2023-10-26 DIAGNOSIS — N13.5 STRICTURE OF URETER: Primary | ICD-10-CM

## 2023-11-02 ENCOUNTER — LAB (OUTPATIENT)
Dept: LAB | Facility: CLINIC | Age: 53
End: 2023-11-02
Payer: COMMERCIAL

## 2023-11-02 DIAGNOSIS — Z48.22 ENCOUNTER FOR AFTERCARE FOLLOWING KIDNEY TRANSPLANT: ICD-10-CM

## 2023-11-02 DIAGNOSIS — Z79.899 HIGH RISK MEDICATION USE: ICD-10-CM

## 2023-11-02 DIAGNOSIS — N13.5 STRICTURE OF URETER: ICD-10-CM

## 2023-11-02 DIAGNOSIS — D84.9 IMMUNODEFICIENCY (H): ICD-10-CM

## 2023-11-02 LAB
ALBUMIN UR-MCNC: 20 MG/DL
ANION GAP SERPL CALCULATED.3IONS-SCNC: 5 MMOL/L (ref 7–15)
APPEARANCE UR: CLEAR
BASOPHILS # BLD AUTO: 0 10E3/UL (ref 0–0.2)
BASOPHILS NFR BLD AUTO: 0 %
BILIRUB UR QL STRIP: NEGATIVE
BUN SERPL-MCNC: 22.7 MG/DL (ref 6–20)
CALCIUM SERPL-MCNC: 9.9 MG/DL (ref 8.6–10)
CHLORIDE SERPL-SCNC: 104 MMOL/L (ref 98–107)
COLOR UR AUTO: YELLOW
CREAT SERPL-MCNC: 1.47 MG/DL (ref 0.51–0.95)
DEPRECATED HCO3 PLAS-SCNC: 33 MMOL/L (ref 22–29)
EGFRCR SERPLBLD CKD-EPI 2021: 42 ML/MIN/1.73M2
EOSINOPHIL # BLD AUTO: 0.1 10E3/UL (ref 0–0.7)
EOSINOPHIL NFR BLD AUTO: 1 %
ERYTHROCYTE [DISTWIDTH] IN BLOOD BY AUTOMATED COUNT: 13.3 % (ref 10–15)
GLUCOSE SERPL-MCNC: 93 MG/DL (ref 70–99)
GLUCOSE UR STRIP-MCNC: NEGATIVE MG/DL
HCT VFR BLD AUTO: 38.1 % (ref 35–47)
HGB BLD-MCNC: 11.5 G/DL (ref 11.7–15.7)
HGB UR QL STRIP: NEGATIVE
IMM GRANULOCYTES # BLD: 0 10E3/UL
IMM GRANULOCYTES NFR BLD: 0 %
KETONES UR STRIP-MCNC: NEGATIVE MG/DL
LEUKOCYTE ESTERASE UR QL STRIP: ABNORMAL
LYMPHOCYTES # BLD AUTO: 0.5 10E3/UL (ref 0.8–5.3)
LYMPHOCYTES NFR BLD AUTO: 6 %
MAGNESIUM SERPL-MCNC: 2.1 MG/DL (ref 1.7–2.3)
MCH RBC QN AUTO: 32.2 PG (ref 26.5–33)
MCHC RBC AUTO-ENTMCNC: 30.2 G/DL (ref 31.5–36.5)
MCV RBC AUTO: 107 FL (ref 78–100)
MONOCYTES # BLD AUTO: 0.6 10E3/UL (ref 0–1.3)
MONOCYTES NFR BLD AUTO: 8 %
NEUTROPHILS # BLD AUTO: 6.9 10E3/UL (ref 1.6–8.3)
NEUTROPHILS NFR BLD AUTO: 85 %
NITRATE UR QL: NEGATIVE
NRBC # BLD AUTO: 0 10E3/UL
NRBC BLD AUTO-RTO: 0 /100
PH UR STRIP: 6.5 [PH] (ref 5–7)
PHOSPHATE SERPL-MCNC: 2.5 MG/DL (ref 2.5–4.5)
PLATELET # BLD AUTO: 161 10E3/UL (ref 150–450)
POTASSIUM SERPL-SCNC: 4 MMOL/L (ref 3.4–5.3)
RBC # BLD AUTO: 3.57 10E6/UL (ref 3.8–5.2)
RBC URINE: 5 /HPF
SODIUM SERPL-SCNC: 142 MMOL/L (ref 135–145)
SP GR UR STRIP: 1.02 (ref 1–1.03)
SQUAMOUS EPITHELIAL: 2 /HPF
UROBILINOGEN UR STRIP-MCNC: NORMAL MG/DL
WBC # BLD AUTO: 8.2 10E3/UL (ref 4–11)
WBC URINE: 15 /HPF

## 2023-11-02 PROCEDURE — 80048 BASIC METABOLIC PNL TOTAL CA: CPT

## 2023-11-02 PROCEDURE — 36415 COLL VENOUS BLD VENIPUNCTURE: CPT

## 2023-11-02 PROCEDURE — 81001 URINALYSIS AUTO W/SCOPE: CPT

## 2023-11-02 PROCEDURE — 85004 AUTOMATED DIFF WBC COUNT: CPT

## 2023-11-02 PROCEDURE — 87086 URINE CULTURE/COLONY COUNT: CPT

## 2023-11-02 PROCEDURE — 84100 ASSAY OF PHOSPHORUS: CPT

## 2023-11-02 PROCEDURE — 83735 ASSAY OF MAGNESIUM: CPT

## 2023-11-03 LAB — BACTERIA UR CULT: NORMAL

## 2023-11-14 ENCOUNTER — LAB (OUTPATIENT)
Dept: LAB | Facility: CLINIC | Age: 53
End: 2023-11-14
Payer: COMMERCIAL

## 2023-11-14 DIAGNOSIS — Z48.22 ENCOUNTER FOR AFTERCARE FOLLOWING KIDNEY TRANSPLANT: ICD-10-CM

## 2023-11-14 DIAGNOSIS — D84.9 IMMUNODEFICIENCY (H): ICD-10-CM

## 2023-11-14 DIAGNOSIS — Z79.899 HIGH RISK MEDICATION USE: ICD-10-CM

## 2023-11-14 LAB
ANION GAP SERPL CALCULATED.3IONS-SCNC: 6 MMOL/L (ref 7–15)
BUN SERPL-MCNC: 25 MG/DL (ref 6–20)
CALCIUM SERPL-MCNC: 10.1 MG/DL (ref 8.6–10)
CHLORIDE SERPL-SCNC: 111 MMOL/L (ref 98–107)
CREAT SERPL-MCNC: 1.57 MG/DL (ref 0.51–0.95)
DEPRECATED HCO3 PLAS-SCNC: 28 MMOL/L (ref 22–29)
EGFRCR SERPLBLD CKD-EPI 2021: 39 ML/MIN/1.73M2
GLUCOSE SERPL-MCNC: 94 MG/DL (ref 70–99)
POTASSIUM SERPL-SCNC: 4.5 MMOL/L (ref 3.4–5.3)
SODIUM SERPL-SCNC: 145 MMOL/L (ref 135–145)

## 2023-11-14 PROCEDURE — 36415 COLL VENOUS BLD VENIPUNCTURE: CPT

## 2023-11-14 PROCEDURE — 82310 ASSAY OF CALCIUM: CPT

## 2023-12-18 ENCOUNTER — TELEPHONE (OUTPATIENT)
Dept: INTERNAL MEDICINE | Facility: CLINIC | Age: 53
End: 2023-12-18
Payer: COMMERCIAL

## 2023-12-18 NOTE — TELEPHONE ENCOUNTER
Spoke with Jana HEAD with [facility] who states she called earlier today and spoke with someone who told her to fax the order over. Informed her that Dr Vazquez is not comfortable ordering this. Jana states the patient had [facility] but they would not take orders from [facility]. States she will reach out to the patient and inform her that Dr Vazquez cannot order the infusion. Writer informed her that we could work on setting her up with a local transplant team

## 2023-12-18 NOTE — TELEPHONE ENCOUNTER
Received fax from [facility] for transplant medication infusion.  No body from [facility] called to discuss this with me.  Please contact [facility] transplant team.  I'm not comfortable ordering that infusion.  If Alla unable to do infusions at [facility], she should have local transplant clinic managing her infusions.

## 2024-01-16 ENCOUNTER — PATIENT OUTREACH (OUTPATIENT)
Dept: CARE COORDINATION | Facility: CLINIC | Age: 54
End: 2024-01-16
Payer: COMMERCIAL

## 2024-01-18 ENCOUNTER — VIRTUAL VISIT (OUTPATIENT)
Dept: URGENT CARE | Facility: CLINIC | Age: 54
End: 2024-01-18
Payer: COMMERCIAL

## 2024-01-18 DIAGNOSIS — J20.9 ACUTE BRONCHITIS WITH COEXISTING CONDITION REQUIRING PROPHYLACTIC TREATMENT: ICD-10-CM

## 2024-01-18 DIAGNOSIS — J01.90 ACUTE SINUSITIS, RECURRENCE NOT SPECIFIED, UNSPECIFIED LOCATION: Primary | ICD-10-CM

## 2024-01-18 PROCEDURE — 99213 OFFICE O/P EST LOW 20 MIN: CPT | Mod: 95

## 2024-01-18 RX ORDER — DOXYCYCLINE HYCLATE 100 MG
100 TABLET ORAL 2 TIMES DAILY
Qty: 14 TABLET | Refills: 0 | Status: SHIPPED | OUTPATIENT
Start: 2024-01-18 | End: 2024-01-25

## 2024-01-18 NOTE — PATIENT INSTRUCTIONS
Take antibiotics as directed. Eat yogurt.  Drink lots of fluids. Rest.  Mucinex is product known to help loosen congestion (generic is guaifenesin)   Delsym 12 hour over the counter works well for cough (generic is dextromethorphan).  There is a combination cough syrup called guiafenesinDM that has both medications included.   Warm steamy shower.   Follow-up with primary care provider symptoms persist more than 2 weeks or symptoms worsen. A chest X-ray may be needed to diagnose persistent symptoms.  If you develop trouble breathing, swallowing or cough-up blood, present to emergency room.

## 2024-01-18 NOTE — PROGRESS NOTES
Alla is a 53 year old female who presents for a billable video visit.    ASSESSMENT/PLAN:  Diagnoses and all orders for this visit:    Acute sinusitis, recurrence not specified, unspecified location  -     doxycycline hyclate (VIBRA-TABS) 100 MG tablet; Take 1 tablet (100 mg) by mouth 2 times daily for 7 days    Acute bronchitis with coexisting condition requiring prophylactic treatment        Patient Instructions   Take antibiotics as directed. Eat yogurt.  Drink lots of fluids. Rest.  Mucinex is product known to help loosen congestion (generic is guaifenesin)   Delsym 12 hour over the counter works well for cough (generic is dextromethorphan).  There is a combination cough syrup called guiafenesinDM that has both medications included.   Warm steamy shower.   Follow-up with primary care provider symptoms persist more than 2 weeks or symptoms worsen. A chest X-ray may be needed to diagnose persistent symptoms.  If you develop trouble breathing, swallowing or cough-up blood, present to emergency room.      SUBJECTIVE:  Patient reports that she has been sick for over 7 days. She has green thick drainage out of nose, and frequent productive cough of green phelgm which is  worse in the am. Once or twice chills/sweats. She feels the symptoms are worsening. No shortness of breath or difficulty breathing. She had a kidney transplant in fall 22, and pneumonia in fall 22.     OBJECTIVE:  Vitals not done due to this being a virtual visit    GENERAL: alert and no distress  EYES: Eyes grossly normal to inspection.  No discharge or erythema, or obvious scleral/conjunctival abnormalities.  RESP: No audible wheeze, or visible cyanosis.  Occasional cough. Hoarse voice.   SKIN: Visible skin clear. No significant rash, abnormal pigmentation or lesions.  NEURO: Cranial nerves grossly intact.  Mentation and speech appropriate for age.  PSYCH: Appropriate affect, tone, and pace of words        Video-Visit Details    Type of service:   Video Visit  Video Start Time: 5:15 PM  Video End Time:5:28 PM    Originating Location (pt. Location): Home    Distant Location (provider location):  [facility]     Platform used for Video Visit: NICHOLAS Calixto, CNP

## 2024-02-28 ENCOUNTER — TELEPHONE (OUTPATIENT)
Dept: PHARMACY | Facility: OTHER | Age: 54
End: 2024-02-28
Payer: COMMERCIAL

## 2024-02-28 NOTE — TELEPHONE ENCOUNTER
MTM Recruitment: [facility]     Referral outreach attempt #1 on February 28, 2024      Outcome: call attempted, could not leave voicemail     MILTON Latham

## 2024-03-06 ENCOUNTER — TELEPHONE (OUTPATIENT)
Dept: PHARMACY | Facility: OTHER | Age: 54
End: 2024-03-06
Payer: COMMERCIAL

## 2024-03-06 NOTE — TELEPHONE ENCOUNTER
MT Recruitment: [facility] insurance     Referral outreach attempt #2 on March 6, 2024      Outcome: visit scheduled    See Edenilson ROSE   [phone]

## 2024-03-11 ENCOUNTER — VIRTUAL VISIT (OUTPATIENT)
Dept: PHARMACY | Facility: CLINIC | Age: 54
End: 2024-03-11
Payer: COMMERCIAL

## 2024-03-11 DIAGNOSIS — T78.40XS ALLERGIC REACTION, SEQUELA: ICD-10-CM

## 2024-03-11 DIAGNOSIS — E66.812 CLASS 2 SEVERE OBESITY WITH SERIOUS COMORBIDITY AND BODY MASS INDEX (BMI) OF 37.0 TO 37.9 IN ADULT, UNSPECIFIED OBESITY TYPE (H): ICD-10-CM

## 2024-03-11 DIAGNOSIS — K21.00 GASTROESOPHAGEAL REFLUX DISEASE WITH ESOPHAGITIS, UNSPECIFIED WHETHER HEMORRHAGE: Primary | ICD-10-CM

## 2024-03-11 DIAGNOSIS — I12.9 HYPERTENSION, RENAL: ICD-10-CM

## 2024-03-11 DIAGNOSIS — Z78.9 TAKES DIETARY SUPPLEMENTS: ICD-10-CM

## 2024-03-11 DIAGNOSIS — T78.40XA ALLERGIC REACTION: ICD-10-CM

## 2024-03-11 DIAGNOSIS — E66.01 CLASS 2 SEVERE OBESITY WITH SERIOUS COMORBIDITY AND BODY MASS INDEX (BMI) OF 37.0 TO 37.9 IN ADULT, UNSPECIFIED OBESITY TYPE (H): ICD-10-CM

## 2024-03-11 DIAGNOSIS — Z76.82 KIDNEY TRANSPLANT CANDIDATE: ICD-10-CM

## 2024-03-11 PROCEDURE — 99605 MTMS BY PHARM NP 15 MIN: CPT | Mod: 95

## 2024-03-11 PROCEDURE — 99607 MTMS BY PHARM ADDL 15 MIN: CPT | Mod: 95

## 2024-03-11 RX ORDER — CETIRIZINE HYDROCHLORIDE 10 MG/1
10 TABLET ORAL DAILY
COMMUNITY

## 2024-03-11 RX ORDER — POTASSIUM CHLORIDE 1500 MG/1
2 TABLET, EXTENDED RELEASE ORAL
COMMUNITY
Start: 2023-10-19

## 2024-03-11 RX ORDER — ATORVASTATIN CALCIUM 80 MG/1
1 TABLET, FILM COATED ORAL
COMMUNITY
Start: 2023-11-21

## 2024-03-11 RX ORDER — SEMAGLUTIDE 1 MG/.5ML
1 INJECTION, SOLUTION SUBCUTANEOUS
COMMUNITY
Start: 2024-02-28 | End: 2024-03-21

## 2024-03-11 NOTE — PATIENT INSTRUCTIONS
"Recommendations from today's MTM visit:                                                      MTM (medication therapy management) is a service provided by a clinical pharmacist designed to help you get the most of out of your medicines.   Today we reviewed what your medicines are for, how to know if they are working, that your medicines are safe and how to make your medicine regimen as easy as possible.      Please check your Vitamin D levels during your next visit with Dr. Vazquez   Continue taking your medications as prescribed      Follow-up: Due when needed by patient, provider or insurance    It was great speaking with you today.  I value your experience and would be very thankful for your time in providing feedback in our clinic survey. In the next few days, you may receive an email or text message from [facility] [employer] with a link to a survey related to your  clinical pharmacist.\"     To schedule another MTM appointment, please call the clinic directly or you may call the MTM scheduling line at [phone].    My Clinical Pharmacist's contact information:                                                      Please feel free to contact me with any questions or concerns you have.        Felton Castillo, PharmD     Medication Therapy Management (MTM) Pharmacist     [phone]     [email or web address]     [facility]    "

## 2024-03-11 NOTE — PROGRESS NOTES
"Medication Therapy Management (MTM) Encounter    ASSESSMENT:                            Medication Adherence/Access: No issues identified      GERD: Stable on pantoprazole 40 mg daily     Hypertension: In clinic, and home BP within goal of < 140/90. Per patient home BP is also within goal. Patient is following with transplant at [facility]. Patient is also following cardio.    Hyperlipidemia: Stable on atorvastatin 80 mg daily     Obesity: On wegovy 1 mg weekly and patient is losing weight.      Kidney Transplant: Recent kidney transplant and is following [facility] \"transplant renal\"    Allergies: Stable on cetirizine 10 mg daily     Supplements: Continue taking current supplements        Today's Vitals: LMP  (LMP Unknown)   ----------------  PLAN:                            Please check your Vitamin D levels during your next visit with Dr. Vazquez   Continue taking your medications as prescribed      Follow-up: Due when needed by patient, provider or insurance    SUBJECTIVE/OBJECTIVE:                          Alla Shrestha is a 53 year old female called for an initial visit. She was referred to me from Patient's health insurance.      Reason for visit: Medication Therapy Review Initial.    Allergies/ADRs: Reviewed in chart  Past Medical History: Reviewed in chart  Tobacco: She reports that she has been smoking cigarettes, cigarettes, and cigarettes. She has a 6 pack-year smoking history. She has never used smokeless tobacco.Nicotine/Tobacco Cessation Plan  Not addressed during this visit.       Medication Adherence/Access: no issues reported. She has a pillbox that she uses    GERD     Pantoprazole 40 mg daily   Patient feels that current regimen is effective. When she stops taking this medication her digestion is not great.        Hypertension    Carvedilol 6.25 mg 3 tablets twice daily    Isosorbide mononitrate 3 mg daily    Torsemide 20 mg daily at night    Potassium Chloride 20 mEq daily--- [place] is " "monitoring   Aspirin 81 mg daily   Patient reports no current medication side effects  Patient self monitors blood pressure.  Home BP monitoring daily 110/70.  .       BP Readings from Last 3 Encounters:   08/04/23 101/60   01/27/23 102/60   03/03/22 116/72     Pulse Readings from Last 3 Encounters:   08/04/23 66   01/27/23 70   03/03/22 110       Hyperlipidemia    Atorvastatin 80 mg daily     Patient reports no significant myalgias or other side effects.  The 10-year ASCVD risk score (Luis E DENTON, et al., 2019) is: 4.3%    Values used to calculate the score:      Age: 53 years      Sex: Female      Is Non- : Yes      Diabetic: No      Tobacco smoker: Yes      Systolic Blood Pressure: 101 mmHg      Is BP treated: Yes      HDL Cholesterol: 37 mg/dL      Total Cholesterol: 151 mg/dL       Recent Labs   Lab Test 03/03/22  1607 07/31/17  1717   CHOL 151  --    HDL 37*  --    LDL 93 71   TRIG 107  --        Obesity    Wegovy 1 mg weekly     - This last month she is down 6 pounds. Last week was her first dose of Wegovy 1 mg, and she will have the second dose tomorrow.     Class II Obesity (BMI 35 to 39.9):   None   Patient reports no current medication side effects.  Nutrition/Eating Habits: She was a late night eater, but improved significantly (she does not have the craving she had before).  She snacks grapes, yoghourt and granula bars  Exercise/Activity: She is not doing exercise at this point due to transportation. She does about 29493 steps every day. She will start going to the gym soon ( se got a car)  Medication History:  None.      Wt Readings from Last 4 Encounters:   08/04/23 240 lb 14.4 oz (109.3 kg)   01/27/23 233 lb (105.7 kg)   03/03/22 251 lb (113.9 kg)   11/17/20 241 lb (109.3 kg)     Estimated body mass index is 37.73 kg/m  as calculated from the following:    Height as of 8/4/23: 5' 7\" (1.702 m).    Weight as of 8/4/23: 240 lb 14.4 oz (109.3 kg).       TSH   Date Value Ref Range " Status   03/03/2022 1.50 0.30 - 5.00 uIU/mL Final       Kidney Transplant:    Prednisone 5 mg daily    Mycophenolate 250 mg, and patient takes 3 capsules every 12 hours.    Belatacept 25 mg IV and she takes every month   She had her transplant in 20222.   Allergies:    Cetirizine 10 mg daily       Patient said this medication is working fine     Supplements :   Women's complete multivitamin Called one a day   Vitamin D3 25 mcg, and patient takes 3 capsules a day.     Today's Vitals: LMP  (LMP Unknown)   ----------------      I spent 45 minutes with this patient today. All changes were made via collaborative practice agreement with Betty Vazquez MD. A copy of the visit note was provided to the patient's provider(s).    A summary of these recommendations was sent via [employer].      Telemedicine Visit Details  Type of service:  Telephone visit  Start Time:  1:30 PM  End Time: 2:15 PM     Medication Therapy Recommendations  No medication therapy recommendations to display     Felton Castillo, PharmD     Medication Therapy Management (MTM) Pharmacist     [phone]     [email or web address]

## 2024-06-23 ENCOUNTER — HEALTH MAINTENANCE LETTER (OUTPATIENT)
Age: 54
End: 2024-06-23

## 2024-07-15 ENCOUNTER — E-VISIT (OUTPATIENT)
Dept: URGENT CARE | Facility: CLINIC | Age: 54
End: 2024-07-15
Payer: COMMERCIAL

## 2024-07-15 ENCOUNTER — E-VISIT (OUTPATIENT)
Dept: INTERNAL MEDICINE | Facility: CLINIC | Age: 54
End: 2024-07-15
Payer: COMMERCIAL

## 2024-07-15 DIAGNOSIS — R21 RASH AND NONSPECIFIC SKIN ERUPTION: Primary | ICD-10-CM

## 2024-07-15 DIAGNOSIS — R21 RASH: Primary | ICD-10-CM

## 2024-07-15 PROCEDURE — 99207 PR NO CHARGE LOS: CPT | Performed by: INTERNAL MEDICINE

## 2024-07-15 PROCEDURE — 99207 PR NON-BILLABLE SERV PER CHARTING: CPT | Performed by: EMERGENCY MEDICINE

## 2024-07-15 NOTE — PATIENT INSTRUCTIONS
Dear Alla Shrestha,    We are sorry you are not feeling well. Based on the responses you provided, it is recommended that you be seen in-person in urgent care so we can better evaluate your symptoms. Please click here to find the nearest urgent care location to you.   You will not be charged for this Visit. Thank you for trusting us with your care.    Ac Felder MD

## 2024-08-09 ENCOUNTER — HOSPITAL ENCOUNTER (INPATIENT)
Facility: CLINIC | Age: 54
LOS: 1 days | Discharge: OTHER INSTITUTION | DRG: 699 | End: 2024-08-11
Attending: EMERGENCY MEDICINE | Admitting: SURGERY
Payer: MEDICARE

## 2024-08-09 ENCOUNTER — APPOINTMENT (OUTPATIENT)
Dept: ULTRASOUND IMAGING | Facility: CLINIC | Age: 54
DRG: 699 | End: 2024-08-09
Attending: EMERGENCY MEDICINE
Payer: MEDICARE

## 2024-08-09 ENCOUNTER — LAB (OUTPATIENT)
Dept: LAB | Facility: CLINIC | Age: 54
End: 2024-08-09
Payer: COMMERCIAL

## 2024-08-09 DIAGNOSIS — Z79.899 HIGH RISK MEDICATION USE: ICD-10-CM

## 2024-08-09 DIAGNOSIS — D84.9 IMMUNODEFICIENCY (H): ICD-10-CM

## 2024-08-09 DIAGNOSIS — T86.19 OTHER COMPLICATION OF KIDNEY TRANSPLANT: ICD-10-CM

## 2024-08-09 DIAGNOSIS — Z96.0 S/P URETERAL STENT PLACEMENT: ICD-10-CM

## 2024-08-09 DIAGNOSIS — Z48.22 ENCOUNTER FOR AFTERCARE FOLLOWING KIDNEY TRANSPLANT: ICD-10-CM

## 2024-08-09 DIAGNOSIS — R79.89 ELEVATED SERUM CREATININE: Primary | ICD-10-CM

## 2024-08-09 LAB
ALBUMIN SERPL BCG-MCNC: 4.1 G/DL (ref 3.5–5.2)
ALBUMIN UR-MCNC: NEGATIVE MG/DL
ALP SERPL-CCNC: 84 U/L (ref 40–150)
ALT SERPL W P-5'-P-CCNC: 14 U/L (ref 0–50)
ANION GAP SERPL CALCULATED.3IONS-SCNC: 11 MMOL/L (ref 7–15)
ANION GAP SERPL CALCULATED.3IONS-SCNC: 12 MMOL/L (ref 7–15)
APPEARANCE UR: CLEAR
AST SERPL W P-5'-P-CCNC: 19 U/L (ref 0–45)
BASOPHILS # BLD AUTO: 0 10E3/UL (ref 0–0.2)
BASOPHILS # BLD AUTO: 0 10E3/UL (ref 0–0.2)
BASOPHILS NFR BLD AUTO: 0 %
BASOPHILS NFR BLD AUTO: 0 %
BILIRUB SERPL-MCNC: 0.4 MG/DL
BILIRUB UR QL STRIP: NEGATIVE
BUN SERPL-MCNC: 25.6 MG/DL (ref 6–20)
BUN SERPL-MCNC: 28.7 MG/DL (ref 6–20)
CALCIUM SERPL-MCNC: 10.1 MG/DL (ref 8.8–10.4)
CALCIUM SERPL-MCNC: 10.4 MG/DL (ref 8.8–10.4)
CHLORIDE SERPL-SCNC: 106 MMOL/L (ref 98–107)
CHLORIDE SERPL-SCNC: 106 MMOL/L (ref 98–107)
COLOR UR AUTO: YELLOW
CREAT SERPL-MCNC: 2.04 MG/DL (ref 0.51–0.95)
CREAT SERPL-MCNC: 2.33 MG/DL (ref 0.51–0.95)
CREAT UR-MCNC: 43.1 MG/DL
EGFRCR SERPLBLD CKD-EPI 2021: 24 ML/MIN/1.73M2
EGFRCR SERPLBLD CKD-EPI 2021: 28 ML/MIN/1.73M2
EOSINOPHIL # BLD AUTO: 0.1 10E3/UL (ref 0–0.7)
EOSINOPHIL # BLD AUTO: 0.1 10E3/UL (ref 0–0.7)
EOSINOPHIL NFR BLD AUTO: 2 %
EOSINOPHIL NFR BLD AUTO: 2 %
ERYTHROCYTE [DISTWIDTH] IN BLOOD BY AUTOMATED COUNT: 13.2 % (ref 10–15)
ERYTHROCYTE [DISTWIDTH] IN BLOOD BY AUTOMATED COUNT: 13.4 % (ref 10–15)
GLUCOSE SERPL-MCNC: 103 MG/DL (ref 70–99)
GLUCOSE SERPL-MCNC: 92 MG/DL (ref 70–99)
GLUCOSE UR STRIP-MCNC: NEGATIVE MG/DL
HCO3 SERPL-SCNC: 28 MMOL/L (ref 22–29)
HCO3 SERPL-SCNC: 28 MMOL/L (ref 22–29)
HCT VFR BLD AUTO: 29.1 % (ref 35–47)
HCT VFR BLD AUTO: 29.7 % (ref 35–47)
HGB BLD-MCNC: 9 G/DL (ref 11.7–15.7)
HGB BLD-MCNC: 9 G/DL (ref 11.7–15.7)
HGB UR QL STRIP: NEGATIVE
IMM GRANULOCYTES # BLD: 0 10E3/UL
IMM GRANULOCYTES # BLD: 0 10E3/UL
IMM GRANULOCYTES NFR BLD: 0 %
IMM GRANULOCYTES NFR BLD: 0 %
KETONES UR STRIP-MCNC: NEGATIVE MG/DL
LEUKOCYTE ESTERASE UR QL STRIP: ABNORMAL
LYMPHOCYTES # BLD AUTO: 0.8 10E3/UL (ref 0.8–5.3)
LYMPHOCYTES # BLD AUTO: 0.9 10E3/UL (ref 0.8–5.3)
LYMPHOCYTES NFR BLD AUTO: 14 %
LYMPHOCYTES NFR BLD AUTO: 15 %
MAGNESIUM SERPL-MCNC: 1.8 MG/DL (ref 1.7–2.3)
MCH RBC QN AUTO: 32 PG (ref 26.5–33)
MCH RBC QN AUTO: 32.4 PG (ref 26.5–33)
MCHC RBC AUTO-ENTMCNC: 30.3 G/DL (ref 31.5–36.5)
MCHC RBC AUTO-ENTMCNC: 30.9 G/DL (ref 31.5–36.5)
MCV RBC AUTO: 105 FL (ref 78–100)
MCV RBC AUTO: 106 FL (ref 78–100)
MICROALBUMIN UR-MCNC: <12 MG/L
MICROALBUMIN/CREAT UR: NORMAL MG/G{CREAT}
MONOCYTES # BLD AUTO: 0.6 10E3/UL (ref 0–1.3)
MONOCYTES # BLD AUTO: 0.6 10E3/UL (ref 0–1.3)
MONOCYTES NFR BLD AUTO: 10 %
MONOCYTES NFR BLD AUTO: 10 %
NEUTROPHILS # BLD AUTO: 4.4 10E3/UL (ref 1.6–8.3)
NEUTROPHILS # BLD AUTO: 4.5 10E3/UL (ref 1.6–8.3)
NEUTROPHILS NFR BLD AUTO: 73 %
NEUTROPHILS NFR BLD AUTO: 74 %
NITRATE UR QL: NEGATIVE
NRBC # BLD AUTO: 0 10E3/UL
NRBC # BLD AUTO: 0 10E3/UL
NRBC BLD AUTO-RTO: 0 /100
NRBC BLD AUTO-RTO: 0 /100
PH UR STRIP: 6.5 [PH] (ref 5–7)
PHOSPHATE SERPL-MCNC: 3.7 MG/DL (ref 2.5–4.5)
PLATELET # BLD AUTO: 153 10E3/UL (ref 150–450)
PLATELET # BLD AUTO: 153 10E3/UL (ref 150–450)
POTASSIUM SERPL-SCNC: 3.9 MMOL/L (ref 3.4–5.3)
POTASSIUM SERPL-SCNC: 4.2 MMOL/L (ref 3.4–5.3)
PROT SERPL-MCNC: 6.7 G/DL (ref 6.4–8.3)
RBC # BLD AUTO: 2.78 10E6/UL (ref 3.8–5.2)
RBC # BLD AUTO: 2.81 10E6/UL (ref 3.8–5.2)
RBC URINE: 1 /HPF
SODIUM SERPL-SCNC: 145 MMOL/L (ref 135–145)
SODIUM SERPL-SCNC: 146 MMOL/L (ref 135–145)
SP GR UR STRIP: 1.02 (ref 1–1.03)
SQUAMOUS EPITHELIAL: 2 /HPF
UROBILINOGEN UR STRIP-MCNC: NORMAL MG/DL
WBC # BLD AUTO: 5.9 10E3/UL (ref 4–11)
WBC # BLD AUTO: 6.1 10E3/UL (ref 4–11)
WBC URINE: 22 /HPF

## 2024-08-09 PROCEDURE — 36415 COLL VENOUS BLD VENIPUNCTURE: CPT | Performed by: EMERGENCY MEDICINE

## 2024-08-09 PROCEDURE — 76775 US EXAM ABDO BACK WALL LIM: CPT | Mod: 26 | Performed by: STUDENT IN AN ORGANIZED HEALTH CARE EDUCATION/TRAINING PROGRAM

## 2024-08-09 PROCEDURE — 80048 BASIC METABOLIC PNL TOTAL CA: CPT

## 2024-08-09 PROCEDURE — 76775 US EXAM ABDO BACK WALL LIM: CPT

## 2024-08-09 PROCEDURE — 36415 COLL VENOUS BLD VENIPUNCTURE: CPT

## 2024-08-09 PROCEDURE — G0378 HOSPITAL OBSERVATION PER HR: HCPCS

## 2024-08-09 PROCEDURE — 85025 COMPLETE CBC W/AUTO DIFF WBC: CPT

## 2024-08-09 PROCEDURE — 83735 ASSAY OF MAGNESIUM: CPT

## 2024-08-09 PROCEDURE — 99285 EMERGENCY DEPT VISIT HI MDM: CPT | Performed by: EMERGENCY MEDICINE

## 2024-08-09 PROCEDURE — 82570 ASSAY OF URINE CREATININE: CPT

## 2024-08-09 PROCEDURE — 87086 URINE CULTURE/COLONY COUNT: CPT | Performed by: EMERGENCY MEDICINE

## 2024-08-09 PROCEDURE — 81001 URINALYSIS AUTO W/SCOPE: CPT | Performed by: EMERGENCY MEDICINE

## 2024-08-09 PROCEDURE — 85025 COMPLETE CBC W/AUTO DIFF WBC: CPT | Performed by: EMERGENCY MEDICINE

## 2024-08-09 PROCEDURE — 84100 ASSAY OF PHOSPHORUS: CPT

## 2024-08-09 ASSESSMENT — ACTIVITIES OF DAILY LIVING (ADL)
ADLS_ACUITY_SCORE: 33
ADLS_ACUITY_SCORE: 35
ADLS_ACUITY_SCORE: 33
ADLS_ACUITY_SCORE: 35

## 2024-08-09 ASSESSMENT — COLUMBIA-SUICIDE SEVERITY RATING SCALE - C-SSRS
1. IN THE PAST MONTH, HAVE YOU WISHED YOU WERE DEAD OR WISHED YOU COULD GO TO SLEEP AND NOT WAKE UP?: NO
2. HAVE YOU ACTUALLY HAD ANY THOUGHTS OF KILLING YOURSELF IN THE PAST MONTH?: NO
6. HAVE YOU EVER DONE ANYTHING, STARTED TO DO ANYTHING, OR PREPARED TO DO ANYTHING TO END YOUR LIFE?: NO

## 2024-08-09 NOTE — ED TRIAGE NOTES
Pt referred to ED for elevated creatine.      Triage Assessment (Adult)       Row Name 08/09/24 1824          Triage Assessment    Airway WDL WDL        Respiratory WDL    Respiratory WDL WDL        Skin Circulation/Temperature WDL    Skin Circulation/Temperature WDL WDL        Cardiac WDL    Cardiac WDL WDL        Peripheral/Neurovascular WDL    Peripheral Neurovascular WDL WDL        Cognitive/Neuro/Behavioral WDL    Cognitive/Neuro/Behavioral WDL WDL

## 2024-08-10 LAB
ANION GAP SERPL CALCULATED.3IONS-SCNC: 10 MMOL/L (ref 7–15)
BUN SERPL-MCNC: 23.1 MG/DL (ref 6–20)
CALCIUM SERPL-MCNC: 10 MG/DL (ref 8.8–10.4)
CHLORIDE SERPL-SCNC: 107 MMOL/L (ref 98–107)
CREAT SERPL-MCNC: 2.02 MG/DL (ref 0.51–0.95)
EGFRCR SERPLBLD CKD-EPI 2021: 29 ML/MIN/1.73M2
GLUCOSE SERPL-MCNC: 93 MG/DL (ref 70–99)
HCO3 SERPL-SCNC: 26 MMOL/L (ref 22–29)
POTASSIUM SERPL-SCNC: 3.9 MMOL/L (ref 3.4–5.3)
SODIUM SERPL-SCNC: 143 MMOL/L (ref 135–145)

## 2024-08-10 PROCEDURE — G0378 HOSPITAL OBSERVATION PER HR: HCPCS

## 2024-08-10 PROCEDURE — 250N000013 HC RX MED GY IP 250 OP 250 PS 637: Performed by: NURSE PRACTITIONER

## 2024-08-10 PROCEDURE — 80048 BASIC METABOLIC PNL TOTAL CA: CPT | Performed by: NURSE PRACTITIONER

## 2024-08-10 PROCEDURE — 99223 1ST HOSP IP/OBS HIGH 75: CPT | Mod: FS | Performed by: NURSE PRACTITIONER

## 2024-08-10 PROCEDURE — 96365 THER/PROPH/DIAG IV INF INIT: CPT

## 2024-08-10 PROCEDURE — 99223 1ST HOSP IP/OBS HIGH 75: CPT | Mod: 4UV | Performed by: UROLOGY

## 2024-08-10 PROCEDURE — 36415 COLL VENOUS BLD VENIPUNCTURE: CPT | Performed by: NURSE PRACTITIONER

## 2024-08-10 PROCEDURE — 250N000012 HC RX MED GY IP 250 OP 636 PS 637

## 2024-08-10 PROCEDURE — 250N000013 HC RX MED GY IP 250 OP 250 PS 637

## 2024-08-10 PROCEDURE — 250N000011 HC RX IP 250 OP 636: Performed by: EMERGENCY MEDICINE

## 2024-08-10 PROCEDURE — 120N000011 HC R&B TRANSPLANT UMMC

## 2024-08-10 PROCEDURE — 258N000003 HC RX IP 258 OP 636

## 2024-08-10 RX ORDER — MYCOPHENOLATE MOFETIL 250 MG/1
750 CAPSULE ORAL 2 TIMES DAILY
Status: DISCONTINUED | OUTPATIENT
Start: 2024-08-10 | End: 2024-08-11 | Stop reason: HOSPADM

## 2024-08-10 RX ORDER — VITAMIN B COMPLEX
75 TABLET ORAL DAILY
Status: DISCONTINUED | OUTPATIENT
Start: 2024-08-10 | End: 2024-08-11 | Stop reason: HOSPADM

## 2024-08-10 RX ORDER — CARVEDILOL 6.25 MG/1
6.25 TABLET ORAL 2 TIMES DAILY
Status: DISCONTINUED | OUTPATIENT
Start: 2024-08-10 | End: 2024-08-10

## 2024-08-10 RX ORDER — CARVEDILOL 6.25 MG/1
6.25 TABLET ORAL 2 TIMES DAILY WITH MEALS
Status: DISCONTINUED | OUTPATIENT
Start: 2024-08-10 | End: 2024-08-10

## 2024-08-10 RX ORDER — TORSEMIDE 20 MG/1
20 TABLET ORAL DAILY
Status: DISCONTINUED | OUTPATIENT
Start: 2024-08-10 | End: 2024-08-11 | Stop reason: HOSPADM

## 2024-08-10 RX ORDER — SODIUM CHLORIDE, SODIUM LACTATE, POTASSIUM CHLORIDE, CALCIUM CHLORIDE 600; 310; 30; 20 MG/100ML; MG/100ML; MG/100ML; MG/100ML
INJECTION, SOLUTION INTRAVENOUS CONTINUOUS
Status: DISCONTINUED | OUTPATIENT
Start: 2024-08-10 | End: 2024-08-10

## 2024-08-10 RX ORDER — MYCOPHENOLATE MOFETIL 250 MG/1
250 CAPSULE ORAL 2 TIMES DAILY
Status: DISCONTINUED | OUTPATIENT
Start: 2024-08-10 | End: 2024-08-10

## 2024-08-10 RX ORDER — CARVEDILOL 12.5 MG/1
12.5 TABLET ORAL ONCE
Status: COMPLETED | OUTPATIENT
Start: 2024-08-10 | End: 2024-08-10

## 2024-08-10 RX ORDER — ISOSORBIDE MONONITRATE 30 MG/1
30 TABLET, EXTENDED RELEASE ORAL DAILY
Status: DISCONTINUED | OUTPATIENT
Start: 2024-08-10 | End: 2024-08-11 | Stop reason: HOSPADM

## 2024-08-10 RX ORDER — DIPHENHYDRAMINE HCL 25 MG
25 CAPSULE ORAL PRN
COMMUNITY

## 2024-08-10 RX ORDER — DOXYCYCLINE HYCLATE 100 MG
100 TABLET ORAL 2 TIMES DAILY
COMMUNITY
Start: 2024-08-07 | End: 2024-08-14

## 2024-08-10 RX ORDER — ASPIRIN 81 MG/1
81 TABLET, CHEWABLE ORAL DAILY
Status: DISCONTINUED | OUTPATIENT
Start: 2024-08-10 | End: 2024-08-11 | Stop reason: HOSPADM

## 2024-08-10 RX ORDER — CEFTRIAXONE 1 G/1
1 INJECTION, POWDER, FOR SOLUTION INTRAMUSCULAR; INTRAVENOUS ONCE
Status: COMPLETED | OUTPATIENT
Start: 2024-08-10 | End: 2024-08-10

## 2024-08-10 RX ORDER — PREDNISONE 5 MG/1
5 TABLET ORAL DAILY
Status: DISCONTINUED | OUTPATIENT
Start: 2024-08-10 | End: 2024-08-11 | Stop reason: HOSPADM

## 2024-08-10 RX ORDER — CEFTRIAXONE 1 G/1
1 INJECTION, POWDER, FOR SOLUTION INTRAMUSCULAR; INTRAVENOUS EVERY 24 HOURS
Status: DISCONTINUED | OUTPATIENT
Start: 2024-08-11 | End: 2024-08-11

## 2024-08-10 RX ORDER — PANTOPRAZOLE SODIUM 40 MG/1
40 TABLET, DELAYED RELEASE ORAL DAILY
Status: DISCONTINUED | OUTPATIENT
Start: 2024-08-10 | End: 2024-08-11 | Stop reason: HOSPADM

## 2024-08-10 RX ORDER — ATORVASTATIN CALCIUM 80 MG/1
80 TABLET, FILM COATED ORAL DAILY
Status: DISCONTINUED | OUTPATIENT
Start: 2024-08-10 | End: 2024-08-11 | Stop reason: HOSPADM

## 2024-08-10 RX ORDER — SEMAGLUTIDE 2.4 MG/.75ML
2.4 INJECTION, SOLUTION SUBCUTANEOUS
COMMUNITY
Start: 2024-06-20

## 2024-08-10 RX ADMIN — CARVEDILOL 6.25 MG: 6.25 TABLET, FILM COATED ORAL at 07:52

## 2024-08-10 RX ADMIN — PANTOPRAZOLE SODIUM 40 MG: 40 TABLET, DELAYED RELEASE ORAL at 13:11

## 2024-08-10 RX ADMIN — TORSEMIDE 20 MG: 20 TABLET ORAL at 14:35

## 2024-08-10 RX ADMIN — ISOSORBIDE MONONITRATE 30 MG: 30 TABLET, EXTENDED RELEASE ORAL at 13:10

## 2024-08-10 RX ADMIN — Medication 75 MCG: at 13:11

## 2024-08-10 RX ADMIN — PREDNISONE 5 MG: 5 TABLET ORAL at 13:10

## 2024-08-10 RX ADMIN — CARVEDILOL 18.75 MG: 12.5 TABLET, FILM COATED ORAL at 20:04

## 2024-08-10 RX ADMIN — ASPIRIN 81 MG CHEWABLE TABLET 81 MG: 81 TABLET CHEWABLE at 13:10

## 2024-08-10 RX ADMIN — PREDNISONE 5 MG: 5 TABLET ORAL at 07:49

## 2024-08-10 RX ADMIN — CEFTRIAXONE SODIUM 1 G: 1 INJECTION, POWDER, FOR SOLUTION INTRAMUSCULAR; INTRAVENOUS at 01:04

## 2024-08-10 RX ADMIN — ATORVASTATIN CALCIUM 80 MG: 80 TABLET, FILM COATED ORAL at 13:16

## 2024-08-10 RX ADMIN — CARVEDILOL 12.5 MG: 12.5 TABLET, FILM COATED ORAL at 09:11

## 2024-08-10 RX ADMIN — MYCOPHENOLATE MOFETIL 750 MG: 250 CAPSULE ORAL at 07:49

## 2024-08-10 RX ADMIN — MYCOPHENOLATE MOFETIL 750 MG: 250 CAPSULE ORAL at 20:04

## 2024-08-10 RX ADMIN — SODIUM CHLORIDE, POTASSIUM CHLORIDE, SODIUM LACTATE AND CALCIUM CHLORIDE: 600; 310; 30; 20 INJECTION, SOLUTION INTRAVENOUS at 05:42

## 2024-08-10 NOTE — PROGRESS NOTES
"Pt. A/Ox4, report given to RN, ready to be transferred to her room on 7A Rm. 720. IV infusing at 75cc/hr. Denied any complaints. V/S stable. Took only some of her morning medications, stated wants to delay the rest for later on. IV was Saline locked just before pt is transferred to  as pt insisted to have her IV stopped. Receiving RN (Lorena) from 7A was made aware and would kindly follow up with the team, I also sent a message to the resident of the Transplant team on this matter.    BP (!) 143/90 (BP Location: Left arm)   Pulse 80   Temp 98  F (36.7  C) (Oral)   Resp 18   Ht 1.702 m (5' 7.01\")   Wt 107 kg (235 lb 14.3 oz)   LMP  (LMP Unknown)   SpO2 100%   BMI 36.94 kg/m       "

## 2024-08-10 NOTE — DISCHARGE SUMMARY
I evaluated the patient today.  I reviewed the discharge plan with the fellow, and agree with the final assessment and plan for discharge as noted in the discharge summary.  I personally spent  30 minutes or less  today on discharge activities for this patient.      Trever Lane MD, COSMO  Professor of Surgery  [employer]  Surgical Director of Liver Transplant Program  Executive Medical Director of Pediatric Transplantation   [facility]    Discharge Summary  Transplant Surgery    Date of Admission:  8/9/2024  Date of Discharge:  8/11/2024  Discharging Provider: Rosalind Gan NP / Trever Lane MD    Discharge Diagnoses   Active Problems:    Hypertension, renal    CHARLEY (acute kidney injury) (H24)    Other complication of kidney transplant    Immunosuppressed status (H24)    Hydronephrosis    Procedure/Surgery Information   Non-operative procedures None performed     History of Present Illness   Alla Shrestha is a 53 year old female w/ PMHx significant for 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 Labs notable for CHARLEY w/ Cr 2.33 on admission elevated from baseline 1.3-1.5. No fevers or chills.    Hospital Course   Alla Shrestha was admitted on 8/9/2024.  The following problems were addressed during her hospitalization:    Hx DDKT 2022 at [facility] c/b CHARLEY; Recurrent hydronephrosis requiring repeated ureteral stent exchanges: US 8/9 with moderate hydronephrosis, partial visualization of stent. Transplant Nephrology consulted d/t CHARLEY. Urology consulted for possible stent exchange, but due to technically difficult stent per last [facility] note, no plans for intervention at our facility. Youssef catheter placed. [facility] notified and US pushed to their system. Received two doses of  ceftriaxone due to concern for UTI on UA, however UCx negative and this was discontinued.    - Patient discharged and instructed to go to [facility] for ongoing management of hydronephrosis after discussion with [facility] Transplant Coordinator. Youssef catheter remains in place on discharge.     Immunosuppressed status:  Maintenance:   - Belatacept 500 mg [id number]    -  mg BID   - Pred 5 mg daily          Discharge Disposition   Discharged to home with instructions to go to [facility]  Condition at discharge: Stable    Pending Results   Unresulted Labs Ordered in the Past 30 Days of this Admission       No orders found from 7/10/2024 to 8/10/2024.          Final pathology results: No pathology submitted  Primary Care Physician   Betty Vazquez    Physical Exam   Temp: 98.1  F (36.7  C) Temp src: Oral BP: 136/78 Pulse: 67   Resp: 16 SpO2: 98 % O2 Device: None (Room air)    Vitals:    08/10/24 0700 08/10/24 0830   Weight: 107 kg (235 lb 14.3 oz) 110.5 kg (243 lb 9.6 oz)     Vital Signs with Ranges  Temp:  [97.6  F (36.4  C)-98.3  F (36.8  C)] 98.1  F (36.7  C)  Pulse:  [67-87] 67  Resp:  [16-18] 16  BP: (122-149)/(61-96) 136/78  SpO2:  [98 %-100 %] 98 %  I/O last 3 completed shifts:  In: 240 [P.O.:240]  Out: 1550 [Urine:1550]    Constitutional: resting comfortably in bed  Eyes: EOMI  Respiratory: unlabored on RA  GI: abdomen soft  Genitourinary: Youssef in place with clear yellow UOP  Skin: warm, dry  Musculoskeletal: generalized edema  Neurologic: A&Ox4  Neuropsychiatric: calm, pleasant    Consultations This Hospital Stay   UROLOGY IP CONSULT  NEPHROLOGY KIDNEY/PANCREAS TRANSPLANT ADULT IP CONSULT  NURSING TO CONSULT FOR VASCULAR ACCESS CARE IP CONSULT  NURSING TO CONSULT FOR VASCULAR ACCESS CARE IP CONSULT  NURSING TO CONSULT FOR VASCULAR ACCESS CARE IP CONSULT    Time Spent on this Encounter   I have spent greater than 30 minutes on this discharge.    Discharge Orders   Discharge Medications   Current Discharge  Medication List        CONTINUE these medications which have NOT CHANGED    Details   aspirin (ASA) 81 MG EC tablet Take 1 tablet (81 mg) by mouth daily    Associated Diagnoses: ESRD needing dialysis (H)      atorvastatin (LIPITOR) 80 MG tablet Take 1 tablet by mouth daily at 2 pm      BELATACEPT IV Inject 500 mg into the vein every 28 (twenty-eight) days      carvedilol (COREG) 6.25 MG tablet Take 18.75 mg by mouth 2 times daily      cetirizine (ZYRTEC) 10 MG tablet Take 10 mg by mouth daily      cholecalciferol (VITAMIN D3) 25 mcg (1000 units) capsule Take 3 capsules by mouth daily      diphenhydrAMINE (BENADRYL) 25 MG capsule Take 25 mg by mouth as needed      doxycycline hyclate (VIBRA-TABS) 100 MG tablet Take 100 mg by mouth 2 times daily      isosorbide mononitrate (IMDUR) 30 MG 24 hr tablet Take 1 tablet by mouth daily at 2 pm      mycophenolate (GENERIC EQUIVALENT) 250 MG capsule TAKE 3 CAPSULES BY MOUTH EVERY 12 HOURS. TAKE MEDICATION ON AN EMPTY STOMACH. DO NOT BREAK, CUT, OR OPEN CAPSULES. MEDICATIONS TO BE REFILLED BY OUTPATIENT TRANSPLANT PROVIDER.      pantoprazole (PROTONIX) 40 MG EC tablet Take 40 mg by mouth daily      potassium chloride ER (K-TAB) 20 MEQ CR tablet Take 2 tablets by mouth daily at 2 pm      predniSONE (DELTASONE) 5 MG tablet Take 5 mg by mouth daily      torsemide (DEMADEX) 20 MG tablet TAKE 1 TABLET BY MOUTH AS NEEDED FOR SWELLING AND WEIGHT GAIN.      WEGOVY 2.4 MG/0.75ML pen Inject 2.4 mg subcutaneously                Reason for your hospital stay    You were admitted for an CHARLEY and possible ureteral stent obstruction.  Urology and Transplant Nephrology were consulted and a Youssef catheter was placed. You are discharging in stable condition with Youssef catheter in place and instructions to follow up with [facility] Transplant team for ongoing management.     Activity    Your activity upon discharge: activity encouraged     Follow Up and recommended labs and tests    Follow up with  [facility] today.     Tubes and drains    You are going home with the following tubes or drains: castillo catheter.  Tube cares per hospital or home care instructions.     Diet    Diet recommendations post-transplant: Heart healthy dietary habits long term (low saturated/trans fat, low sodium). Practice food safety precautions.         Data   Most Recent 3 CBC's:  Recent Labs   Lab Test 08/09/24 2041 08/09/24  0858 11/02/23  1337   WBC 6.1 5.9 8.2   HGB 9.0* 9.0* 11.5*   * 105* 107*    153 161      Most Recent 3 BMP's:  Recent Labs   Lab Test 08/10/24  0856 08/09/24 2041 08/09/24  0858    146* 145   POTASSIUM 3.9 4.2 3.9   CHLORIDE 107 106 106   CO2 26 28 28   BUN 23.1* 25.6* 28.7*   CR 2.02* 2.04* 2.33*   ANIONGAP 10 12 11   MATTI 10.0 10.1 10.4   GLC 93 92 103*     Most Recent 2 LFT's:  Recent Labs   Lab Test 08/09/24 2041 03/03/22  1607   AST 19 16   ALT 14 10   ALKPHOS 84 131*   BILITOTAL 0.4 0.5     Most Recent INR's and Anticoagulation Dosing History:  Anticoagulation Dose History          Latest Ref Rng & Units 1/5/2018 1/16/2018 1/30/2018 5/16/2018 6/11/2018   Recent Dosing and Labs   INR 0.86 - 1.14 1.50  1.60  2.00  1.16  1.13       Details                 Most Recent 3 Troponin's:No lab results found.  Most Recent Cholesterol Panel:  Recent Labs   Lab Test 03/03/22  1607   CHOL 151   LDL 93   HDL 37*   TRIG 107     Most Recent 6 Bacteria Isolates From Any Culture (See EPIC Reports for Culture Details):No lab results found.  Most Recent TSH, T4 and A1c Labs:  Recent Labs   Lab Test 03/03/22  1607   TSH 1.50     Results for orders placed or performed during the hospital encounter of 08/09/24   US Renal Transplant without Doppler    Narrative    EXAMINATION: US RENAL TRANSPLANT,  8/9/2024 9:48 PM     COMPARISON: None    HISTORY: Rising creatinine, stent, concern for hydronephrosis.    TECHNIQUE:  Grey-scale and color Doppler.    FINDINGS:    The transplant kidney is located  in the right lower quadrant, and  measures 11.9 cm. No focal lesions. Moderate hydronephrosis.  Nephroureteral stents partially visualized.. No perinephric fluid  collection.    Bladder: Not visualized      Impression    IMPRESSION: Moderate hydronephrosis of the renal transplant; partial  visualization of presumed ureteral stent.    I have personally reviewed the examination and initial interpretation  and I agree with the findings.    JAH GUERRA MD         SYSTEM ID:  [id number]

## 2024-08-10 NOTE — CONSULTS
[facility]  Transplant Nephrology Consult Note  Date of Admission:  8/9/2024  Today's Date: 08/10/2024  Requesting physician: No att. providers found    Reason for Consult:  Kidney transplant    Recommendations:   - follow-up urine culture  -stop IVF  -continue ceftriaxone   -would restart torsemide   -consider stent exchange    Assessment & Plan   # DDKT: CHARLEY now trending down   - Baseline Creatinine: ~ 1.3-1.5   - Proteinuria: Normal (<0.2 grams)   - DSA Hx: No DSA   - Last cPRA: unknown%   - BK Viremia: No   - Kidney Tx Biopsy Hx: No biopsy history.    # Membranous GN:  no evidence of reoccurance    # Immunosuppression: Belatacept (dose 5 mg/kg every four weeks), Mycophenolate mofetil (dose 750 mg every 12 hours), and Prednisone (dose 5 mg daily)   - Induction with Recent Transplant:  Not known due to time from transplant   - Continue with intensive monitoring of immunosuppression for efficacy and toxicity.   - Historical Changes in Immunosuppression:  changed to dionicio at the [place]   - Changes:  not at this time    # Infection Prevention:      - PJP: None  - CMV IgG Ab High Risk Discordance (D+/R-): Unknown  - EBV IgG Ab High Risk Discordance (D+/R-): Unknown    # Hypertension: Borderline control;  Goal BP: < 150/90   - Changes: Yes - would restart torsemide       # Anemia in Chronic Renal Disease: Hgb: Stable      ANNY: No   - Iron studies: Not checked recently    # Mineral Bone Disorder:    - Secondary renal hyperparathyroidism; PTH level: Not checked recently        On treatment: None  - Vitamin D; level: Not checked recently        On supplement: Yes  - Calcium; level: Normal        On supplement: No  - Phosphorus; level: Normal        On supplement: No    # Electrolytes:   - Potassium; level: Normal        On supplement: No  - Magnesium; level: Normal        On supplement: No  - Bicarbonate; level: Normal        On supplement: No  - Sodium; level: Normal    # Graft  hydronephrosis: recurrent with stent exchange    #4 Obesity, status post sleeve gastrectomy in June 2022 with weight regain    #5 Tricuspid valve disease with mild to moderate regurgitation, and moderate stenosis    Post capillary pulmonary hypertension under Cardiology care: recommend restarting torsemide     # Transplant History:  Etiology of Kidney Failure: Membranous GN   Tx: DDKT  Transplant: N/A  Significant transplant-related complications: None    Recommendations were communicated to the primary team verbally.    Seen and discussed with Dr. Kalen Mitsry NP  Transplant Nephrology  Contact information via Vocera Web Console or via [facility] Paging/Directory      History of Present Illness      Review of Systems   The 10 point Review of Systems is negative other than noted in the HPI or here.      MEDICATIONS:  Current Facility-Administered Medications   Medication Dose Route Frequency Provider Last Rate Last Admin    aspirin (ASA) chewable tablet 81 mg  81 mg Oral Daily Tay Son MD        atorvastatin (LIPITOR) tablet 80 mg  80 mg Oral Daily Tay Son MD        carvedilol (COREG) tablet 12.5 mg  12.5 mg Oral Once Rosalind Gan NP        carvedilol (COREG) tablet 18.75 mg  18.75 mg Oral BID Rosalind Gan NP        isosorbide mononitrate (IMDUR) 24 hr tablet 30 mg  30 mg Oral Daily Tay Son MD        mycophenolate (GENERIC EQUIVALENT) capsule 750 mg  750 mg Oral BID Tay Son MD   750 mg at 08/10/24 0749    pantoprazole (PROTONIX) EC tablet 40 mg  40 mg Oral Daily Rosalind Gan NP        predniSONE (DELTASONE) tablet 5 mg  5 mg Oral Daily Tay Son MD   5 mg at 08/10/24 0749    Vitamin D3 (CHOLECALCIFEROL) tablet 75 mcg  75 mcg Oral Daily Tay Son MD         Current Facility-Administered Medications   Medication Dose Route Frequency Provider Last Rate Last Admin    lactated ringers infusion   Intravenous  "Continuous Tay Son MD 75 mL/hr at 08/10/24 0542 New Bag at 08/10/24 0542       Physical Exam   Temp  Av.3  F (36.8  C)  Min: 98  F (36.7  C)  Max: 98.7  F (37.1  C)      Pulse  Av.8  Min: 70  Max: 92 Resp  Av.3  Min: 15  Max: 18  SpO2  Av.8 %  Min: 94 %  Max: 100 %     BP (!) 143/90 (BP Location: Left arm)   Pulse 80   Temp 98  F (36.7  C) (Oral)   Resp 18   Ht 1.702 m (5' 7.01\")   Wt 107 kg (235 lb 14.3 oz)   LMP  (LMP Unknown)   SpO2 100%   BMI 36.94 kg/m      Admit Weight: 107 kg (235 lb 14.3 oz)     GENERAL APPEARANCE: alert and no distress  HENT: mouth without ulcers or lesions  RESP: lungs clear to auscultation - no rales, rhonchi or wheezes  CV: regular rhythm, normal rate, no rub, no murmur  EDEMA: no LE edema bilaterally  ABDOMEN: soft, nondistended, nontender, bowel sounds normal  MS: extremities normal - no gross deformities noted, no evidence of inflammation in joints, no muscle tenderness  SKIN: no rash    Data   All labs reviewed by me.  CMP  Recent Labs   Lab 24  0858   * 145   POTASSIUM 4.2 3.9   CHLORIDE 106 106   CO2 28 28   ANIONGAP 12 11   GLC 92 103*   BUN 25.6* 28.7*   CR 2.04* 2.33*   GFRESTIMATED 28* 24*   MATTI 10.1 10.4   MAG  --  1.8   PHOS  --  3.7   PROTTOTAL 6.7  --    ALBUMIN 4.1  --    BILITOTAL 0.4  --    ALKPHOS 84  --    AST 19  --    ALT 14  --      CBC  Recent Labs   Lab 24  0858   HGB 9.0* 9.0*   WBC 6.1 5.9   RBC 2.81* 2.78*   HCT 29.7* 29.1*   * 105*   MCH 32.0 32.4   MCHC 30.3* 30.9*   RDW 13.4 13.2    153     INRNo lab results found in last 7 days.  ABGNo lab results found in last 7 days.   Urine Studies  Recent Labs   Lab Test 24  1337 23  1601 18  1029   COLOR Yellow Yellow  --  Yellow   APPEARANCE Clear Clear  --  Slightly Cloudy   URINEGLC Negative Negative  --  150*   URINEBILI Negative Negative  --  Negative   URINEKETONE Negative " Negative  --  Negative   SG 1.017 1.019  --  1.011   UBLD Negative Negative  --  Small*   URINEPH 6.5 6.5  --  6.0   PROTEIN Negative 20*  --  >499*   NITRITE Negative Negative  --  Negative   LEUKEST Moderate* Moderate*  --  Small*   RBCU 1 5* >100* 7*   WBCU 22* 15* 10-25* 27*     No lab results found.  PTH  No lab results found.  Iron Studies  Recent Labs   Lab Test 01/27/23  1225   IRON 59   *   IRONSAT 25       IMAGING:  All imaging studies reviewed by me.    Past Medical History    I have reviewed this patient's medical history and updated it with pertinent information if needed.   Past Medical History:   Diagnosis Date    Alcohol abuse     Anemia     has received iron infusions    Anemia in chronic renal disease     Arthritis     Asthma     Chronic kidney disease     Stage 4    CKD (chronic kidney disease) stage 5, GFR less than 15 ml/min (H)     Stage IV    Dyslipidemia     Gout     Gout     Hay fever     History of blood transfusion 2018    [facility]    HTN (hypertension)     Hyperlipidemia     Hypertension, renal     Iron deficiency     Low hemoglobin     Membranous glomerulonephritis 2009    Metabolic acidosis     Mild intermittent asthma     pt. denies, but is listed by     Nephrotic syndrome with membranous glomerulonephritis     Obesity     Osteoarthritis     Knees    Pancreatitis     pt not aware of this diagnosis    Secondary renal hyperparathyroidism (H24)     Tobacco abuse     Vitamin D deficiency        Past Surgical History   I have reviewed this patient's surgical history and updated it with pertinent information if needed.  Past Surgical History:   Procedure Laterality Date    BIOPSY  2006    Kidney @ [facility] in [place], [place]    CREATE FISTULA ARTERIOVENOUS UPPER EXTREMITY Right 2008    HYSTERECTOMY  1999    fibroids    OOPHORECTOMY      listed by  /pt. thought they left her ovaries when she had hyst at young age    partial hysterectomy  1998    [facility] in [place], [place]    RENAL  BIOPSY  2010    [facility] TOTAL KNEE ARTHROPLASTY Bilateral 2017    TKA    [facility] TOTAL KNEE ARTHROPLASTY  11/15/2017    Procedure: LEFT TOTAL KNEE ARTHROPLASTY;  Surgeon: Kyle Villanueva MD;  Location: [facility];  Service: Orthopedics    [facility] TOTAL KNEE ARTHROPLASTY Right 12/20/2017    Procedure: RIGHT TOTAL KNEE ARTHROPLASTY;  Surgeon: Kyle Villanueva MD;  Location: [facility];  Service: Orthopedics       Family History   I have reviewed this patient's family history and updated it with pertinent information if needed.   Family History   Problem Relation Age of Onset    Hypertension Mother     Lung Cancer Mother     Chronic Obstructive Pulmonary Disease Mother     Coronary Artery Disease Father     Hypertension Father     Cerebrovascular Disease Maternal Grandmother     Other Cancer Maternal Grandfather     Coronary Artery Disease Paternal Grandfather     Hypertension Paternal Grandfather     Bipolar Disorder Brother     Chronic Obstructive Pulmonary Disease Brother     Arthritis Mother     Heart Disease Father     Alcoholism Father     Chronic Obstructive Pulmonary Disease Brother     Alcoholism Brother     Asthma Brother     Hypertension Son     Heart Disease Maternal Grandfather     Coronary Artery Disease Maternal Grandfather     Cancer Paternal Grandmother     Vision Loss Paternal Grandmother     Clotting Disorder Paternal Grandfather     Cancer Paternal Grandfather     Colon Cancer Maternal Uncle     Cancer Paternal Uncle        Social History   I have reviewed this patient's social history and updated it with pertinent information if needed. Alla Shrestha  reports that she has been smoking cigarettes, cigarettes, and cigarettes. She has a 6 pack-year smoking history. She has never used smokeless tobacco. She reports current alcohol use. She reports that she does not use drugs.    Prior to Admission Medications   Medications Prior to Admission   Medication Sig Dispense Refill Last  Dose    aspirin (ASA) 81 MG EC tablet Take 1 tablet (81 mg) by mouth daily   8/9/2024    atorvastatin (LIPITOR) 80 MG tablet Take 1 tablet by mouth daily at 2 pm   8/9/2024    BELATACEPT IV Inject 500 mg into the vein every 28 (twenty-eight) days   7/18/2024    carvedilol (COREG) 6.25 MG tablet Take 18.75 mg by mouth 2 times daily   8/9/2024    cetirizine (ZYRTEC) 10 MG tablet Take 10 mg by mouth daily   8/9/2024    cholecalciferol (VITAMIN D3) 25 mcg (1000 units) capsule Take 3 capsules by mouth daily   8/9/2024    diphenhydrAMINE (BENADRYL) 25 MG capsule Take 25 mg by mouth as needed   8/9/2024    doxycycline hyclate (VIBRA-TABS) 100 MG tablet Take 100 mg by mouth 2 times daily    at has not started    isosorbide mononitrate (IMDUR) 30 MG 24 hr tablet Take 1 tablet by mouth daily at 2 pm   8/9/2024    mycophenolate (GENERIC EQUIVALENT) 250 MG capsule TAKE 3 CAPSULES BY MOUTH EVERY 12 HOURS. TAKE MEDICATION ON AN EMPTY STOMACH. DO NOT BREAK, CUT, OR OPEN CAPSULES. MEDICATIONS TO BE REFILLED BY OUTPATIENT TRANSPLANT PROVIDER.   8/9/2024    pantoprazole (PROTONIX) 40 MG EC tablet Take 40 mg by mouth daily   8/9/2024    potassium chloride ER (K-TAB) 20 MEQ CR tablet Take 2 tablets by mouth daily at 2 pm   8/9/2024    predniSONE (DELTASONE) 5 MG tablet Take 5 mg by mouth daily   8/9/2024    torsemide (DEMADEX) 20 MG tablet TAKE 1 TABLET BY MOUTH AS NEEDED FOR SWELLING AND WEIGHT GAIN.   8/9/2024    WEGOVY 2.4 MG/0.75ML pen Inject 2.4 mg subcutaneously   8/9/2024

## 2024-08-10 NOTE — PLAN OF CARE
Goal Outcome Evaluation:  A&Ox4. B/P runs high.Denied pain.Up ad anisha to bathroom. Voided x1. Ceftriaxone 1 gm IVx1 given.  Awaiting for orders.

## 2024-08-10 NOTE — PLAN OF CARE
"Goal Outcome Evaluation:      Plan of Care Reviewed With: patient    Overall Patient Progress: no change    Outcome Evaluation: Plan for patient to transfer care to [place] for follow up transplant care.    /80 (BP Location: Left arm)   Pulse 87   Temp 98.2  F (36.8  C) (Oral)   Resp 18   Ht 1.702 m (5' 7\")   Wt 110.5 kg (243 lb 9.6 oz)   LMP  (LMP Unknown)   SpO2 100%   BMI 38.15 kg/m       Patient alert and oriented x 4. VS stable. Patient on room air. Patient denies pain. Patient denies nausea. Urine Output - Youssef catheter in place. Adequate urine output. Bowel Function - No BM during shift. Nutrition - Regular diet. No orders for PIV. Activity - UAL. Plan of Care - Plan for patient to transfer care to [place] for stent exchange, as [place] transplanted patient's kidney.        "

## 2024-08-10 NOTE — PROGRESS NOTES
"/76   Pulse 80   Temp 98.1  F (36.7  C)   Resp 16   Ht 1.702 m (5' 7\")   Wt 110.5 kg (243 lb 9.6 oz)   LMP  (LMP Unknown)   SpO2 100%   BMI 38.15 kg/m      Shift: 4001-7338  Isolation Status: None  VS: Hypertensive otherwise VSS on RA, afebrile  Neuro: Aox4  Behaviors: Calm, cooperative  BG: N/A  Labs: Creat 2.02  Respiratory: WDL  Cardiac: Hypertensive  Pain/Nausea: Denies both.  PRN: None given  Diet: regular  IV Access: None  Infusion(s): IV Rocephin Q24hrs  GI/: Decreased UOP, castillo inserted due to hydronephrosis  Skin: RLQ hockey stick abd scar from previous kidney txp, scattered bruising  Mobility: UAL  Plan: Awaiting a bed at [facility].    "

## 2024-08-10 NOTE — MEDICATION SCRIBE - ADMISSION MEDICATION HISTORY
Medication Scribe Admission Medication History    Admission medication history is complete. The information provided in this note is only as accurate as the sources available at the time of the update.    Information Source(s): Patient via in-person    Pertinent Information: Pt reported taking medications on PTA medication list as directed.    Changes made to PTA medication list:  Added: Diphenhydramine 25 mg cap, Doxycycline hyclate 100 mg tabs, Wegovy 2.4 mg/0.75ml pen.   Deleted: Belatacept 25 mg/ml SOLR  Changed: None    Allergies reviewed with patient and updates made in EHR: yes    Medication History Completed By: Amelia Dobson 8/10/2024 7:30 AM    PTA Med List   Medication Sig Last Dose    aspirin (ASA) 81 MG EC tablet Take 1 tablet (81 mg) by mouth daily 8/9/2024    atorvastatin (LIPITOR) 80 MG tablet Take 1 tablet by mouth daily at 2 pm 8/9/2024    carvedilol (COREG) 6.25 MG tablet TAKE 3 TABLETS BY MOUTH TWICE DAILY WITH MEALS 8/9/2024    cholecalciferol (VITAMIN D3) 25 mcg (1000 units) capsule Take 3 capsules by mouth daily 8/9/2024    diphenhydrAMINE (BENADRYL) 25 MG capsule Take 25 mg by mouth as needed 8/9/2024    doxycycline hyclate (VIBRA-TABS) 100 MG tablet Take 100 mg by mouth 2 times daily  at has not started    isosorbide mononitrate (IMDUR) 30 MG 24 hr tablet Take 1 tablet by mouth daily at 2 pm 8/9/2024    mycophenolate (GENERIC EQUIVALENT) 250 MG capsule TAKE 3 CAPSULES BY MOUTH EVERY 12 HOURS. TAKE MEDICATION ON AN EMPTY STOMACH. DO NOT BREAK, CUT, OR OPEN CAPSULES. MEDICATIONS TO BE REFILLED BY OUTPATIENT TRANSPLANT PROVIDER. 8/9/2024    pantoprazole (PROTONIX) 40 MG EC tablet Take 40 mg by mouth daily 8/9/2024    potassium chloride ER (K-TAB) 20 MEQ CR tablet Take 2 tablets by mouth daily at 2 pm 8/9/2024    predniSONE (DELTASONE) 5 MG tablet Take 5 mg by mouth daily 8/9/2024    torsemide (DEMADEX) 20 MG tablet TAKE 1 TABLET BY MOUTH AS NEEDED FOR SWELLING AND WEIGHT GAIN. 8/9/2024     WEGOVY 2.4 MG/0.75ML pen Inject 2.4 mg subcutaneously 8/9/2024

## 2024-08-10 NOTE — CONSULTS
Urology Consult History and Physical    Name: Alla Shrestha    MRN: [id number]   YOB: 1970         We were asked to see Alla Shrestha at the request of transplant surgery for evaluation and treatment of transplant hydronephrosis, elevated Cr.          Chief Complaint:   - transplant hydro, CHARLEY, oligouria     History is obtained from the patient          History of Present Illness:   Alla Shrestha is a 53 year old female w/ PMHx significant for ESRD sp renal transplant in 2022, recurrent hydronephrosis w/ indwelling ureteral stent in her transplanted ureter, HTN, and HLD now presenting with decreased urine output and elevated Cr.     Per patient, she underwent extraction of 3 teeth one week ago and was briefly on amoxicillin. After starting the antibiotic, she noticed a new rash on her face for which she went to urgent care and was prescribed doxycycline. Subsequently over the last 3-4 days, she has noticed decreased urine output and increased leg swelling. She presented to be evaluated as a result. Denies fevers, chills, abdominal or chest pain, or SOB. Denies hematuria. Continues to make urine although she feels amount was less than normal even with taking torsemide. She has been taking her transplant immunosuppression as prescribed. Denies needing to go dialysis since transplant.     Labs on admission notable for Na 146, Cr 2.04 (from 2.33; baseline 1.3-1.5). WBC 5.9, Hgb 9, Plt 153. UA with moderate Leukocyte esterase.    Briefly had PNT into transplant kidney, removed in 6/2023. Now with chronic indwelling stent, last exchanged in 11/2023; undergoing exchange q6-12 months. Per Dr. Patel's note on 10/17/23:  The patient has a history of membranous glomerulonephritis -> ESRD -> dialysis and then transplant in 2022. She had a US with nephrology in 2023 that demonstrated new hydronephrosis in the transplant kidney. She had a nephrostomy tube placed and antegrade nephrostogram demonstrated  a high grade stricture. At that time her creatinine was 1.94. She had a dx ureteroscopy 5/2023 that showed no obstruction, but when the stent was removed she had an CHARLEY (creatinine of 1.9) and had IR stent placement.          Past Medical History:     Past Medical History:   Diagnosis Date    Alcohol abuse     Anemia     has received iron infusions    Anemia in chronic renal disease     Arthritis     Asthma     Chronic kidney disease     Stage 4    CKD (chronic kidney disease) stage 5, GFR less than 15 ml/min (H)     Stage IV    Dyslipidemia     Gout     Gout     Hay fever     History of blood transfusion 2018    [facility]    HTN (hypertension)     Hyperlipidemia     Hypertension, renal     Iron deficiency     Low hemoglobin     Membranous glomerulonephritis 2009    Metabolic acidosis     Mild intermittent asthma     pt. denies, but is listed by     Nephrotic syndrome with membranous glomerulonephritis     Obesity     Osteoarthritis     Knees    Pancreatitis     pt not aware of this diagnosis    Secondary renal hyperparathyroidism (H24)     Tobacco abuse     Vitamin D deficiency             Past Surgical History:     Past Surgical History:   Procedure Laterality Date    BIOPSY  2006    Kidney @ [facility] in [place], [place]    CREATE FISTULA ARTERIOVENOUS UPPER EXTREMITY Right 2008    HYSTERECTOMY  1999    fibroids    OOPHORECTOMY      listed by  /pt. thought they left her ovaries when she had hyst at young age    partial hysterectomy  1998    [facility] in [place], [place]    RENAL BIOPSY  2010    [facility] TOTAL KNEE ARTHROPLASTY Bilateral 2017    TKA    [facility] TOTAL KNEE ARTHROPLASTY  11/15/2017    Procedure: LEFT TOTAL KNEE ARTHROPLASTY;  Surgeon: Kyle Villanueva MD;  Location: [facility];  Service: Orthopedics    [facility] TOTAL KNEE ARTHROPLASTY Right 12/20/2017    Procedure: RIGHT TOTAL KNEE ARTHROPLASTY;  Surgeon: Kyle Villanueva MD;  Location: [facility];  Service: Orthopedics            Social  History:     Social History     Tobacco Use    Smoking status: Every Day     Current packs/day: 0.30     Average packs/day: 0.3 packs/day for 20.0 years (6.0 ttl pk-yrs)     Types: Cigarettes    Smokeless tobacco: Never    Tobacco comments:     6 cigs /day   Substance Use Topics    Alcohol use: Yes     Comment: Alcoholic Drinks/day: rare            Family History:     Family History   Problem Relation Age of Onset    Hypertension Mother     Lung Cancer Mother     Chronic Obstructive Pulmonary Disease Mother     Coronary Artery Disease Father     Hypertension Father     Cerebrovascular Disease Maternal Grandmother     Other Cancer Maternal Grandfather     Coronary Artery Disease Paternal Grandfather     Hypertension Paternal Grandfather     Bipolar Disorder Brother     Chronic Obstructive Pulmonary Disease Brother     Arthritis Mother     Heart Disease Father     Alcoholism Father     Chronic Obstructive Pulmonary Disease Brother     Alcoholism Brother     Asthma Brother     Hypertension Son     Heart Disease Maternal Grandfather     Coronary Artery Disease Maternal Grandfather     Cancer Paternal Grandmother     Vision Loss Paternal Grandmother     Clotting Disorder Paternal Grandfather     Cancer Paternal Grandfather     Colon Cancer Maternal Uncle     Cancer Paternal Uncle               Allergies:     Allergies   Allergen Reactions    Adhesive Tape Rash    Penicillins Rash and Hives            Medications:     Current Facility-Administered Medications   Medication Dose Route Frequency Provider Last Rate Last Admin    aspirin (ASA) chewable tablet 81 mg  81 mg Oral Daily Tay Son MD        atorvastatin (LIPITOR) tablet 80 mg  80 mg Oral Daily Tay Son MD        carvedilol (COREG) tablet 18.75 mg  18.75 mg Oral BID Rosalind Gan NP        isosorbide mononitrate (IMDUR) 24 hr tablet 30 mg  30 mg Oral Daily Tay Son MD        lactated ringers infusion   Intravenous  "Continuous Tay Son MD 75 mL/hr at 08/10/24 0542 New Bag at 08/10/24 0542    mycophenolate (GENERIC EQUIVALENT) capsule 750 mg  750 mg Oral BID Tay Son MD   750 mg at 08/10/24 0749    pantoprazole (PROTONIX) EC tablet 40 mg  40 mg Oral Daily Rosalind Gan, RAFAEL        predniSONE (DELTASONE) tablet 5 mg  5 mg Oral Daily Tay Son MD   5 mg at 08/10/24 0749    Vitamin D3 (CHOLECALCIFEROL) tablet 75 mcg  75 mcg Oral Daily Tay Son MD                 Review of Systems:    ROS: 10 point ROS neg other than the symptoms noted above in the HPI.          Physical Exam:   Patient Vitals for the past 24 hrs:   BP Temp Temp src Pulse Resp SpO2 Height Weight   08/10/24 0830 (!) 146/96 97.6  F (36.4  C) -- 78 16 100 % 1.702 m (5' 7\") 110.5 kg (243 lb 9.6 oz)   08/10/24 0802 (!) 143/90 98  F (36.7  C) Oral 80 18 100 % -- --   08/10/24 0752 (!) 149/61 -- -- 70 -- -- -- --   08/10/24 0700 -- -- -- -- -- -- 1.702 m (5' 7.01\") 107 kg (235 lb 14.3 oz)   08/10/24 0416 (!) 133/91 98.3  F (36.8  C) Oral 82 16 99 % -- --   08/10/24 0033 (!) 137/93 98.7  F (37.1  C) Oral 75 16 94 % -- --   08/09/24 1824 (!) 158/98 98.3  F (36.8  C) Oral 92 15 98 % -- --     General: age-appropriate appearing female in NAD  HEENT: Head AT/NC, EOMI, CN Grossly intact  Lungs: no respiratory distress, or pursed lip breathing  Heart: No obvious jugular venous distension present  Back: no bony midline tenderness, no CVAT bilaterally.  Abdomen: soft, non-distended, non-tender. Right well-healed [place] incision.  : normal female external genitalia. No catheter.  LE: mild bilateral non-pitting edema.   Musculoskeltal: extremities normal, no peripheral edema  Skin: no suspicious lesions or rashes  Neuro: Alert, oriented, speech and mentation normal  Psych: affect and mood normal          Data:   All laboratory data reviewed:    Recent Labs   Lab 08/09/24  2041 08/09/24  0858   WBC 6.1 5.9   HGB 9.0* 9.0*   PLT " 153 153       Recent Labs   Lab 08/10/24  0856 08/09/24  2041 08/09/24  0858    146* 145   POTASSIUM 3.9 4.2 3.9   CHLORIDE 107 106 106   CO2 26 28 28   BUN 23.1* 25.6* 28.7*   CR 2.02* 2.04* 2.33*   GLC 93 92 103*   MATTI 10.0 10.1 10.4   MAG  --   --  1.8   PHOS  --   --  3.7       Recent Labs   Lab 08/09/24 2010   COLOR Yellow   APPEARANCE Clear   URINEGLC Negative   URINEBILI Negative   URINEKETONE Negative   SG 1.017   URINEPH 6.5   PROTEIN Negative   NITRITE Negative   LEUKEST Moderate*   RBCU 1   WBCU 22*       All pertinent imaging reviewed:    Transplant kidney with moderate hydronephrosis          Impression and Plan:   Impression:   Alla Shrestha is a 53 year old female w/ PMHx significant for ESRD sp renal transplant in 2022, recurrent hydronephrosis w/ indwelling ureteral stent in her transplanted ureter, HTN, and HLD now presenting with decreased urine output and elevated Cr. US with moderate hydronephrosis. Per her history she is making some urine so it suggests that the stent is somewhat functional, however it may not be completely open. Another possible etiology is urinary retention and reflux into the ureteral stent. Given this and because her creatinine is stable currently would recommend an indwelling castillo catheter be placed and creatinine to be trended. Would recommend no intervention at this time. Will continue to evaluate need for possible intervention. Would recommend more urgent intervention should she become anuric or there is a significant worsening of her renal function.    Unfortunately the on-call urology team this weekend does not have the skill-set for a transplant kidney exchange. Should an intervention be required this weekend (for the reasons mentioned above), would recommend IR PNT placement given these limitations. If she should continue to need an intervention early next week, urology would plan to discuss with one of the endourologists about potential stent exchange.        Plan:   - recommend castillo placement  - recommend trending creatinine  - no plan for intervention at this time; okay for diet per urology  - should the patient require intervention (anuria, significant decrease in renal function, etc); would require IR PNT placement (weekend on-call urology team does not have skill set to exchange this difficult transplant stent per last [facility] note)  - recommend NPO at midnight should she require IR PNT placement  - Urology will continue to follow; should she continue to require intervention by early this coming week, urology will plan to discuss stent exchange with one of the endourologists        Chadwick Plata MD PGY4  Urology Resident

## 2024-08-10 NOTE — ED PROVIDER NOTES
[facility] EMERGENCY DEPARTMENT ([facility])    8/09/24       ED PROVIDER NOTE    History     Chief Complaint   Patient presents with    Abnormal Labs     HPI  Alla Shrestha is a 53 year old female with PMHx of ESRD s/p kidney transplant in 2022, recurrent hydronephrosis with indwelling ureteral stent, immunosuppressed, and CHF who presents to the ED from clinic for evaluation of abnormal labs. BMP done this morning showed creatinine of 2.33 elevated from 1.57 in 11/2023.Patient reports getting 3 teeth extracted a week ago because of a dental infection and has been on an antibiotic for this. She has noticed decreased urine output. No changes to color of urine. She feels she is holding on to more fluid than normal and has noticed leg swelling. Her last stent placement was in 11/2023 and she has a scheduled replacement for 9/13/24. She reports taking torsemide. Patient just started doxycycline course for skin infection yesterday. No other medication changes. No other symptoms noted.     Past Medical History  Past Medical History:   Diagnosis Date    Alcohol abuse     Anemia     has received iron infusions    Anemia in chronic renal disease     Arthritis     Asthma     Chronic kidney disease     Stage 4    CKD (chronic kidney disease) stage 5, GFR less than 15 ml/min (H)     Stage IV    Dyslipidemia     Gout     Gout     Hay fever     History of blood transfusion 2018    [facility]    HTN (hypertension)     Hyperlipidemia     Hypertension, renal     Iron deficiency     Low hemoglobin     Membranous glomerulonephritis 2009    Metabolic acidosis     Mild intermittent asthma     pt. denies, but is listed by     Nephrotic syndrome with membranous glomerulonephritis     Obesity     Osteoarthritis     Knees    Pancreatitis     pt not aware of this diagnosis    Secondary renal hyperparathyroidism (H24)     Tobacco abuse     Vitamin D deficiency      Past Surgical History:   Procedure Laterality Date     BIOPSY  2006    Kidney @ [facility] in [place], [place]    CREATE FISTULA ARTERIOVENOUS UPPER EXTREMITY Right 2008    HYSTERECTOMY  1999    fibroids    OOPHORECTOMY      listed by dr. /pt. thought they left her ovaries when she had hyst at young age    partial hysterectomy  1998    [facility] in [place], [place]    RENAL BIOPSY  2010    [facility] TOTAL KNEE ARTHROPLASTY Bilateral 2017    TKA    [facility] TOTAL KNEE ARTHROPLASTY  11/15/2017    Procedure: LEFT TOTAL KNEE ARTHROPLASTY;  Surgeon: Kyle Villanueva MD;  Location: [facility];  Service: Orthopedics    [facility] TOTAL KNEE ARTHROPLASTY Right 12/20/2017    Procedure: RIGHT TOTAL KNEE ARTHROPLASTY;  Surgeon: Kyle Villanueva MD;  Location: [facility];  Service: Orthopedics     aspirin (ASA) 81 MG EC tablet  atorvastatin (LIPITOR) 80 MG tablet  belatacept (NULOJIX) 25 mg/mL SOLR  carvedilol (COREG) 6.25 MG tablet  cetirizine (ZYRTEC) 10 MG tablet  cholecalciferol (VITAMIN D3) 25 mcg (1000 units) capsule  isosorbide mononitrate (IMDUR) 30 MG 24 hr tablet  mycophenolate (GENERIC EQUIVALENT) 250 MG capsule  pantoprazole (PROTONIX) 40 MG EC tablet  potassium chloride ER (K-TAB) 20 MEQ CR tablet  predniSONE (DELTASONE) 5 MG tablet  torsemide (DEMADEX) 20 MG tablet      Allergies   Allergen Reactions    Adhesive Tape Rash    Penicillins Rash and Hives     Family History  Family History   Problem Relation Age of Onset    Hypertension Mother     Lung Cancer Mother     Chronic Obstructive Pulmonary Disease Mother     Coronary Artery Disease Father     Hypertension Father     Cerebrovascular Disease Maternal Grandmother     Other Cancer Maternal Grandfather     Coronary Artery Disease Paternal Grandfather     Hypertension Paternal Grandfather     Bipolar Disorder Brother     Chronic Obstructive Pulmonary Disease Brother     Arthritis Mother     Heart Disease Father     Alcoholism Father     Chronic Obstructive Pulmonary Disease Brother     Alcoholism Brother     Asthma  Brother     Hypertension Son     Heart Disease Maternal Grandfather     Coronary Artery Disease Maternal Grandfather     Cancer Paternal Grandmother     Vision Loss Paternal Grandmother     Clotting Disorder Paternal Grandfather     Cancer Paternal Grandfather     Colon Cancer Maternal Uncle     Cancer Paternal Uncle      Social History   Social History     Tobacco Use    Smoking status: Every Day     Current packs/day: 0.30     Average packs/day: 0.3 packs/day for 20.0 years (6.0 ttl pk-yrs)     Types: Cigarettes    Smokeless tobacco: Never    Tobacco comments:     6 cigs /day   Substance Use Topics    Alcohol use: Yes     Comment: Alcoholic Drinks/day: rare    Drug use: No      Past medical history, past surgical history, medications, allergies, family history, and social history were reviewed with the patient. No additional pertinent items.     A complete review of systems was performed with pertinent positives and negatives noted in the HPI, and all other systems negative.    Physical Exam   BP: (!) 158/98  Pulse: 92  Temp: 98.3  F (36.8  C)  Resp: 15  SpO2: 98 %  Physical Exam  Vitals and nursing note reviewed.   Constitutional:       General: She is not in acute distress.     Appearance: She is well-developed. She is not diaphoretic.   HENT:      Head: Normocephalic and atraumatic.      Mouth/Throat:      Pharynx: No oropharyngeal exudate.   Eyes:      General: No scleral icterus.        Right eye: No discharge.         Left eye: No discharge.      Pupils: Pupils are equal, round, and reactive to light.   Cardiovascular:      Rate and Rhythm: Normal rate and regular rhythm.      Heart sounds: Normal heart sounds. No murmur heard.     No friction rub. No gallop.   Pulmonary:      Effort: Pulmonary effort is normal. No respiratory distress.      Breath sounds: Normal breath sounds. No wheezing.   Chest:      Chest wall: No tenderness.   Abdominal:      General: Bowel sounds are normal. There is no distension.       Palpations: Abdomen is soft.      Tenderness: There is no abdominal tenderness.   Musculoskeletal:         General: No tenderness or deformity. Normal range of motion.      Cervical back: Normal range of motion and neck supple.   Skin:     General: Skin is warm and dry.      Coloration: Skin is not pale.      Findings: No erythema or rash.   Neurological:      Mental Status: She is alert and oriented to person, place, and time.      Cranial Nerves: No cranial nerve deficit.           ED Course, Procedures, & Data     ED Course as of 08/10/24 0225   Fri Aug 09, 2024   2122 Creatinine(!): 2.04     Procedures                Results for orders placed or performed in visit on 08/09/24   Basic metabolic panel     Status: Abnormal   Result Value Ref Range    Sodium 145 135 - 145 mmol/L    Potassium 3.9 3.4 - 5.3 mmol/L    Chloride 106 98 - 107 mmol/L    Carbon Dioxide (CO2) 28 22 - 29 mmol/L    Anion Gap 11 7 - 15 mmol/L    Urea Nitrogen 28.7 (H) 6.0 - 20.0 mg/dL    Creatinine 2.33 (H) 0.51 - 0.95 mg/dL    GFR Estimate 24 (L) >60 mL/min/1.73m2    Calcium 10.4 8.8 - 10.4 mg/dL    Glucose 103 (H) 70 - 99 mg/dL   Magnesium     Status: Normal   Result Value Ref Range    Magnesium 1.8 1.7 - 2.3 mg/dL   Phosphorus     Status: Normal   Result Value Ref Range    Phosphorus 3.7 2.5 - 4.5 mg/dL   Albumin Random Urine Quantitative with Creat Ratio     Status: None   Result Value Ref Range    Creatinine Urine mg/dL 43.1 mg/dL    Albumin Urine mg/L <12.0 mg/L    Albumin Urine mg/g Cr     CBC with platelets and differential     Status: Abnormal   Result Value Ref Range    WBC Count 5.9 4.0 - 11.0 10e3/uL    RBC Count 2.78 (L) 3.80 - 5.20 10e6/uL    Hemoglobin 9.0 (L) 11.7 - 15.7 g/dL    Hematocrit 29.1 (L) 35.0 - 47.0 %     (H) 78 - 100 fL    MCH 32.4 26.5 - 33.0 pg    MCHC 30.9 (L) 31.5 - 36.5 g/dL    RDW 13.2 10.0 - 15.0 %    Platelet Count 153 150 - 450 10e3/uL    % Neutrophils 74 %    % Lymphocytes 14 %    % Monocytes 10 %     % Eosinophils 2 %    % Basophils 0 %    % Immature Granulocytes 0 %    NRBCs per 100 WBC 0 <1 /100    Absolute Neutrophils 4.4 1.6 - 8.3 10e3/uL    Absolute Lymphocytes 0.8 0.8 - 5.3 10e3/uL    Absolute Monocytes 0.6 0.0 - 1.3 10e3/uL    Absolute Eosinophils 0.1 0.0 - 0.7 10e3/uL    Absolute Basophils 0.0 0.0 - 0.2 10e3/uL    Absolute Immature Granulocytes 0.0 <=0.4 10e3/uL    Absolute NRBCs 0.0 10e3/uL   CBC with Platelets & Differential     Status: Abnormal    Narrative    The following orders were created for panel order CBC with Platelets & Differential.  Procedure                               Abnormality         Status                     ---------                               -----------         ------                     CBC with platelets and d...[[id number]]  Abnormal            Final result                 Please view results for these tests on the individual orders.     Medications - No data to display  Labs Ordered and Resulted from Time of ED Arrival to Time of ED Departure - No data to display  No orders to display              Assessment & Plan    53-year-old male with a history of kidney transplant in 2022 who presents with a rising creatinine.  This was found in outpatient.  Patient states she otherwise feels well.  No changes in medication.  She does have utero stent in place.  Lab work shows creatinine of 2.04.  UA shows possible UTI.  Patient was given ceftriaxone.  Ultrasound of transplanted kidney shows hydronephrosis, concern for stent dysfunction.  Discussed case with Transplant Surgery who will admit to their service under observation.    I have reviewed the nursing notes. I have reviewed the findings, diagnosis, plan and need for follow up with the patient.    New Prescriptions    No medications on file       Final diagnoses:   None   I, Nayla West, am serving as a trained medical scribe to document services personally performed by Gus Reyes DO based on the provider's statements  to me on August 9, 2024.  This document has been checked and approved by the attending provider.    I, Gus Reyes DO, was physically present and have reviewed and verified the accuracy of this note documented by Nayla West medical scribe.      Gus Reyes DO  [facility] EMERGENCY DEPARTMENT  8/9/2024     Gus Reyes DO  08/10/24 0227

## 2024-08-10 NOTE — H&P
[facility]    History and Physical - Transplant Surgery Service       Date of Admission:  8/9/2024    Assessment & Plan: Surgery   Alla Shrestha is a 53 year old female w/ PMHx significant for ESRD sp renal transplant in 2022, recurrent hydronephrosis w/ indwelling ureteral stent, HTN, and HLD now presenting with decreased urine output and elevated Cr. Continues to make urine. Labs notable for CHARLEY w/ Cr 2.33 on admission elevated from baseline 1.3-1.5. No fevers or chills. Given new CHARLEY in setting of renal transplant, possibility of rejection always a concern. Given transplant was performed at [place], will discuss transfer in the event she needs biopsy of transplanted kidney.     - Keep NPO, on IVF  - Admitted to transplant surgery, obs status   - Relevant PTA meds     The patient's care was discussed with the fellow Dr. Snell who will discuss with staff.    Tay Son MD  [facility]  Non-urgent messages: Securely message with [employer] (more info)  Text page via [employer] Paging/Directory     ______________________________________________________________________    Chief Complaint   Abnormal labs     History is obtained from the patient and per chart review.     History of Present Illness   Alla Shrestha is a 53 year old female w/ PMHx significant for ESRD sp renal transplant in 2022, recurrent hydronephrosis w/ indwelling ureteral stent in her transplanted ureter, HTN, and HLD now presenting with decreased urine output and elevated Cr.     Per patient, she underwent extraction of 3 teeth one week ago and was briefly on amoxicillin. After starting the antibiotic, she noticed a new rash on her face for which she went to urgent care and was prescribed doxycycline. Subsequently over the last 3-4 days, she has noticed decreased urine output and increased leg swelling. She presented to be evaluated as a result.  Denies fevers, chills, abdominal or chest pain, or SOB. Denies hematuria. Continues to make urine although she feels amount was less than normal even with taking torsemide. She has been taking her transplant immunosuppression as prescribed. Denies needing to go dialysis since transplant.     Labs on admission notable for Na 146, Cr 2.04 (from 2.33; baseline 1.3-1.5). WBC 5.9, Hgb 9, Plt 153. UA with moderate Leukocyte esterase.       Past Medical History    Past Medical History:   Diagnosis Date    Alcohol abuse     Anemia     has received iron infusions    Anemia in chronic renal disease     Arthritis     Asthma     Chronic kidney disease     Stage 4    CKD (chronic kidney disease) stage 5, GFR less than 15 ml/min (H)     Stage IV    Dyslipidemia     Gout     Gout     Hay fever     History of blood transfusion 2018    [facility]    HTN (hypertension)     Hyperlipidemia     Hypertension, renal     Iron deficiency     Low hemoglobin     Membranous glomerulonephritis 2009    Metabolic acidosis     Mild intermittent asthma     pt. denies, but is listed by     Nephrotic syndrome with membranous glomerulonephritis     Obesity     Osteoarthritis     Knees    Pancreatitis     pt not aware of this diagnosis    Secondary renal hyperparathyroidism (H24)     Tobacco abuse     Vitamin D deficiency        Past Surgical History   Past Surgical History:   Procedure Laterality Date    BIOPSY  2006    Kidney @ [facility] in [place], [place]    CREATE FISTULA ARTERIOVENOUS UPPER EXTREMITY Right 2008    HYSTERECTOMY  1999    fibroids    OOPHORECTOMY      listed by  /pt. thought they left her ovaries when she had hyst at young age    partial hysterectomy  1998    [facility] in [place], [place]    RENAL BIOPSY  2010    [facility] TOTAL KNEE ARTHROPLASTY Bilateral 2017    TKA    [facility] TOTAL KNEE ARTHROPLASTY  11/15/2017    Procedure: LEFT TOTAL KNEE ARTHROPLASTY;  Surgeon: Kyle Villanueva MD;  Location: [facility];  Service: Orthopedics     ZZC TOTAL KNEE ARTHROPLASTY Right 12/20/2017    Procedure: RIGHT TOTAL KNEE ARTHROPLASTY;  Surgeon: Kyle Villanueva MD;  Location: [facility];  Service: Orthopedics       Prior to Admission Medications   Prior to Admission Medications   Prescriptions Last Dose Informant Patient Reported? Taking?   aspirin (ASA) 81 MG EC tablet   No No   Sig: Take 1 tablet (81 mg) by mouth daily   atorvastatin (LIPITOR) 80 MG tablet   Yes No   Sig: Take 1 tablet by mouth daily at 2 pm   belatacept (NULOJIX) 25 mg/mL SOLR   Yes No   Sig: Inject 1,125 mg into the vein   carvedilol (COREG) 6.25 MG tablet   Yes No   Sig: TAKE 3 TABLETS BY MOUTH TWICE DAILY WITH MEALS   cetirizine (ZYRTEC) 10 MG tablet   Yes No   Sig: Take 10 mg by mouth daily   cholecalciferol (VITAMIN D3) 25 mcg (1000 units) capsule   Yes No   Sig: Take 3 capsules by mouth daily   isosorbide mononitrate (IMDUR) 30 MG 24 hr tablet   Yes No   Sig: Take 1 tablet by mouth daily at 2 pm   mycophenolate (GENERIC EQUIVALENT) 250 MG capsule   Yes No   Sig: TAKE 3 CAPSULES BY MOUTH EVERY 12 HOURS. TAKE MEDICATION ON AN EMPTY STOMACH. DO NOT BREAK, CUT, OR OPEN CAPSULES. MEDICATIONS TO BE REFILLED BY OUTPATIENT TRANSPLANT PROVIDER.   pantoprazole (PROTONIX) 40 MG EC tablet   Yes No   potassium chloride ER (K-TAB) 20 MEQ CR tablet   Yes No   Sig: Take 2 tablets by mouth daily at 2 pm   predniSONE (DELTASONE) 5 MG tablet   Yes No   Sig: Take 5 mg by mouth daily   torsemide (DEMADEX) 20 MG tablet   Yes No   Sig: TAKE 1 TABLET BY MOUTH AS NEEDED FOR SWELLING AND WEIGHT GAIN.      Facility-Administered Medications: None        Review of Systems    The 10 point Review of Systems is negative other than noted in the HPI or here.      Physical Exam   Vital Signs: Temp: 98.7  F (37.1  C) Temp src: Oral BP: (!) 137/93 Pulse: 75   Resp: 16 SpO2: 94 % O2 Device: None (Room air)    Weight: 0 lbs 0 oz  Intake/Output Summary (Last 24 hours) at 8/10/2024 0241  Last data filed at  8/10/2024 0100  Gross per 24 hour   Intake 100 ml   Output --   Net 100 ml     Constitutional: awake, alert, cooperative, no apparent distress, and appears stated age  Eyes: Lids and lashes normal, pupils equal, round and reactive to light, extra ocular muscles intact, sclera clear, conjunctiva normal  Respiratory: non labored breathing on room air   Cardiovascular: RRR, normotensive   GI:  abdomen soft, nondistended, nontender. Well healed surgical incisions   Skin: no bruising or bleeding and normal skin color, texture, turgor  Musculoskeletal: trace lower extremity pitting edema present  full range of motion noted  Neurologic: Awake, alert, oriented to name, place and time.  Cranial nerves II-XII are grossly intact.  Motor is 5 out of 5 bilaterally.          Data   Most Recent 3 CBC's:  Recent Labs   Lab Test 08/09/24 2041 08/09/24 0858 11/02/23  1337   WBC 6.1 5.9 8.2   HGB 9.0* 9.0* 11.5*   * 105* 107*    153 161     Most Recent 3 BMP's:  Recent Labs   Lab Test 08/09/24 2041 08/09/24 0858 11/14/23  1031   * 145 145   POTASSIUM 4.2 3.9 4.5   CHLORIDE 106 106 111*   CO2 28 28 28   BUN 25.6* 28.7* 25.0*   CR 2.04* 2.33* 1.57*   ANIONGAP 12 11 6*   MATTI 10.1 10.4 10.1*   GLC 92 103* 94     Most Recent 2 LFT's:  Recent Labs   Lab Test 08/09/24 2041 03/03/22  1607   AST 19 16   ALT 14 10   ALKPHOS 84 131*   BILITOTAL 0.4 0.5     I have reviewed history, examined patient and discussed plan with the fellow/resident/FEMI.    I concur with the findings in this note.    Time spent on admission activities: 45 minutes.

## 2024-08-11 VITALS
BODY MASS INDEX: 38.24 KG/M2 | OXYGEN SATURATION: 98 % | DIASTOLIC BLOOD PRESSURE: 78 MMHG | SYSTOLIC BLOOD PRESSURE: 136 MMHG | TEMPERATURE: 98.1 F | RESPIRATION RATE: 16 BRPM | WEIGHT: 243.6 LBS | HEART RATE: 67 BPM | HEIGHT: 67 IN

## 2024-08-11 PROBLEM — D84.9 IMMUNOSUPPRESSED STATUS (H): Status: ACTIVE | Noted: 2024-08-11

## 2024-08-11 PROBLEM — N13.30 HYDRONEPHROSIS: Status: ACTIVE | Noted: 2024-08-11

## 2024-08-11 LAB — BACTERIA UR CULT: NORMAL

## 2024-08-11 PROCEDURE — 99238 HOSP IP/OBS DSCHRG MGMT 30/<: CPT | Mod: FS | Performed by: NURSE PRACTITIONER

## 2024-08-11 PROCEDURE — 250N000011 HC RX IP 250 OP 636: Performed by: NURSE PRACTITIONER

## 2024-08-11 PROCEDURE — 999N000248 HC STATISTIC IV INSERT WITH US BY RN

## 2024-08-11 PROCEDURE — 250N000013 HC RX MED GY IP 250 OP 250 PS 637: Performed by: NURSE PRACTITIONER

## 2024-08-11 PROCEDURE — 250N000012 HC RX MED GY IP 250 OP 636 PS 637

## 2024-08-11 RX ADMIN — CARVEDILOL 18.75 MG: 12.5 TABLET, FILM COATED ORAL at 07:37

## 2024-08-11 RX ADMIN — CEFTRIAXONE SODIUM 1 G: 1 INJECTION, POWDER, FOR SOLUTION INTRAMUSCULAR; INTRAVENOUS at 02:00

## 2024-08-11 RX ADMIN — MYCOPHENOLATE MOFETIL 750 MG: 250 CAPSULE ORAL at 07:38

## 2024-08-11 ASSESSMENT — ACTIVITIES OF DAILY LIVING (ADL)
ADLS_ACUITY_SCORE: 35

## 2024-08-11 NOTE — PLAN OF CARE
"  Problem: Adult Inpatient Plan of Care  Goal: Plan of Care Review  Description: The Plan of Care Review/Shift note should be completed every shift.  The Outcome Evaluation is a brief statement about your assessment that the patient is improving, declining, or no change.  This information will be displayed automatically on your shift  note.  Outcome: Progressing  Goal: Patient-Specific Goal (Individualized)  Description: You can add care plan individualizations to a care plan. Examples of Individualization might be:  \"Parent requests to be called daily at 9am for status\", \"I have a hard time hearing out of my right ear\", or \"Do not touch me to wake me up as it startles  me\".  Outcome: Progressing  Goal: Absence of Hospital-Acquired Illness or Injury  Outcome: Progressing  Intervention: Identify and Manage Fall Risk  Recent Flowsheet Documentation  Taken 8/11/2024 0000 by Raquel Clayton RN  Safety Promotion/Fall Prevention:   nonskid shoes/slippers when out of bed   patient and family education   room near nurse's station   room organization consistent   safety round/check completed   treat reversible contributory factors   treat underlying cause  Intervention: Prevent Skin Injury  Recent Flowsheet Documentation  Taken 8/11/2024 0000 by Raquel Clayton RN  Body Position: position changed independently  Intervention: Prevent Infection  Recent Flowsheet Documentation  Taken 8/11/2024 0000 by Raquel Clayton RN  Infection Prevention:   environmental surveillance performed   equipment surfaces disinfected   hand hygiene promoted   personal protective equipment utilized   rest/sleep promoted   single patient room provided  Goal: Optimal Comfort and Wellbeing  Outcome: Progressing  Goal: Readiness for Transition of Care  Outcome: Progressing   Goal Outcome Evaluation:                        "

## 2024-08-11 NOTE — PLAN OF CARE
"/81 (BP Location: Left arm)   Pulse 79   Temp 98.3  F (36.8  C) (Oral)   Resp 16   Ht 1.702 m (5' 7\")   Wt 110.5 kg (243 lb 9.6 oz)   LMP  (LMP Unknown)   SpO2 100%   BMI 38.15 kg/m      Shift: 8982-8750  Isolation Status: None  VS: stable on RA, afebrile  Neuro: Aox4  Behaviors: slept   BG: None   Labs: Am labs pending   Respiratory: WDL  Cardiac: WDL  Pain/Nausea: Deines   PRN: None   Diet: Regular   IV Access: L PIV  Infusion(s): None  Lines/Drains: Youssef   GI/: good urine output   Skin: RLQ hockey stick abd scar from previous kidney txp, scattered bruising   Mobility: Indep   Events/Education: discharge to [facility] at 9am 8/11  Plan: Cont w/POC   "

## 2024-08-12 ENCOUNTER — PATIENT OUTREACH (OUTPATIENT)
Dept: CARE COORDINATION | Facility: CLINIC | Age: 54
End: 2024-08-12
Payer: COMMERCIAL

## 2024-08-12 NOTE — PROGRESS NOTES
[facility]: Transitions of Care Outreach  Chief Complaint   Patient presents with    Clinic Care Coordination - Post Hospital       Most Recent Admission Date: 8/9/2024   Most Recent Admission Diagnosis: Other complication of kidney transplant - T86.19     Most Recent Discharge Date: 8/11/2024   Most Recent Discharge Diagnosis: Other complication of kidney transplant - T86.19  Elevated serum creatinine - R79.89  S/P ureteral stent placement - Z96.0     Transitions of Care Assessment    Discharge Assessment  How are you doing now that you are home?: Phone call was brief as patient reports she was unhappy with the help she got at [facility]. She Would not go into more detail before she ended the call.  How are your symptoms? (Red Flag symptoms escalate to triage hotline per guidelines): Unchanged  Do you know how to contact your clinic care team if you have future questions or changes to your health status? : Yes  Does the patient have their discharge instructions? : Yes  Does the patient have questions regarding their discharge instructions? : No  Were you started on any new medications or were there changes to any of your previous medications? : No  Does the patient have all of their medications?: Yes  Do you have questions regarding any of your medications? : No  Do you have all of your needed medical supplies or equipment (DME)?  (i.e. oxygen tank, CPAP, cane, etc.): Yes           Follow up Plan   Follow up with [facility] ER Transplant Team today.        No future appointments.    Outpatient Plan as outlined on AVS reviewed with patient.    For any urgent concerns, please contact our 24 hour nurse triage line: [phone] ([phone])       MARK Austin (she/her/hers)  Social Work Clinic Care Coordinator   [facility]  [email or web address]  [phone]

## 2024-09-12 ENCOUNTER — LAB (OUTPATIENT)
Dept: LAB | Facility: CLINIC | Age: 54
End: 2024-09-12
Payer: COMMERCIAL

## 2024-09-12 DIAGNOSIS — D84.9 IMMUNODEFICIENCY (H): ICD-10-CM

## 2024-09-12 DIAGNOSIS — Z79.899 HIGH RISK MEDICATION USE: ICD-10-CM

## 2024-09-12 DIAGNOSIS — Z48.22 ENCOUNTER FOR AFTERCARE FOLLOWING KIDNEY TRANSPLANT: ICD-10-CM

## 2024-09-12 LAB
ANION GAP SERPL CALCULATED.3IONS-SCNC: 11 MMOL/L (ref 7–15)
BASOPHILS # BLD AUTO: 0 10E3/UL (ref 0–0.2)
BASOPHILS NFR BLD AUTO: 0 %
BUN SERPL-MCNC: 26.1 MG/DL (ref 6–20)
CALCIUM SERPL-MCNC: 9.8 MG/DL (ref 8.8–10.4)
CHLORIDE SERPL-SCNC: 101 MMOL/L (ref 98–107)
CREAT SERPL-MCNC: 1.49 MG/DL (ref 0.51–0.95)
EGFRCR SERPLBLD CKD-EPI 2021: 42 ML/MIN/1.73M2
EOSINOPHIL # BLD AUTO: 0.1 10E3/UL (ref 0–0.7)
EOSINOPHIL NFR BLD AUTO: 2 %
ERYTHROCYTE [DISTWIDTH] IN BLOOD BY AUTOMATED COUNT: 15.6 % (ref 10–15)
GLUCOSE SERPL-MCNC: 98 MG/DL (ref 70–99)
HCO3 SERPL-SCNC: 28 MMOL/L (ref 22–29)
HCT VFR BLD AUTO: 32.8 % (ref 35–47)
HGB BLD-MCNC: 10 G/DL (ref 11.7–15.7)
IMM GRANULOCYTES # BLD: 0 10E3/UL
IMM GRANULOCYTES NFR BLD: 0 %
LYMPHOCYTES # BLD AUTO: 0.6 10E3/UL (ref 0.8–5.3)
LYMPHOCYTES NFR BLD AUTO: 13 %
MAGNESIUM SERPL-MCNC: 1.9 MG/DL (ref 1.7–2.3)
MCH RBC QN AUTO: 32.5 PG (ref 26.5–33)
MCHC RBC AUTO-ENTMCNC: 30.5 G/DL (ref 31.5–36.5)
MCV RBC AUTO: 107 FL (ref 78–100)
MONOCYTES # BLD AUTO: 0.7 10E3/UL (ref 0–1.3)
MONOCYTES NFR BLD AUTO: 14 %
NEUTROPHILS # BLD AUTO: 3.3 10E3/UL (ref 1.6–8.3)
NEUTROPHILS NFR BLD AUTO: 71 %
NRBC # BLD AUTO: 0 10E3/UL
NRBC BLD AUTO-RTO: 0 /100
PHOSPHATE SERPL-MCNC: 3.2 MG/DL (ref 2.5–4.5)
PLATELET # BLD AUTO: 149 10E3/UL (ref 150–450)
POTASSIUM SERPL-SCNC: 3.7 MMOL/L (ref 3.4–5.3)
RBC # BLD AUTO: 3.08 10E6/UL (ref 3.8–5.2)
SODIUM SERPL-SCNC: 140 MMOL/L (ref 135–145)
WBC # BLD AUTO: 4.6 10E3/UL (ref 4–11)

## 2024-09-12 PROCEDURE — 84100 ASSAY OF PHOSPHORUS: CPT

## 2024-09-12 PROCEDURE — 85025 COMPLETE CBC W/AUTO DIFF WBC: CPT

## 2024-09-12 PROCEDURE — 83735 ASSAY OF MAGNESIUM: CPT

## 2024-09-12 PROCEDURE — 36415 COLL VENOUS BLD VENIPUNCTURE: CPT

## 2024-09-12 PROCEDURE — 80048 BASIC METABOLIC PNL TOTAL CA: CPT

## 2025-04-19 ENCOUNTER — HEALTH MAINTENANCE LETTER (OUTPATIENT)
Age: 55
End: 2025-04-19

## 2025-07-10 ENCOUNTER — OFFICE VISIT (OUTPATIENT)
Dept: INTERNAL MEDICINE | Facility: CLINIC | Age: 55
End: 2025-07-10
Payer: COMMERCIAL

## 2025-07-10 VITALS
SYSTOLIC BLOOD PRESSURE: 100 MMHG | HEART RATE: 70 BPM | WEIGHT: 251.9 LBS | DIASTOLIC BLOOD PRESSURE: 64 MMHG | TEMPERATURE: 97.7 F | BODY MASS INDEX: 39.54 KG/M2 | HEIGHT: 67 IN | RESPIRATION RATE: 15 BRPM | OXYGEN SATURATION: 100 %

## 2025-07-10 DIAGNOSIS — E66.812 CLASS 2 SEVERE OBESITY WITH SERIOUS COMORBIDITY AND BODY MASS INDEX (BMI) OF 39.0 TO 39.9 IN ADULT, UNSPECIFIED OBESITY TYPE (H): ICD-10-CM

## 2025-07-10 DIAGNOSIS — I05.9 MITRAL VALVE DISEASE: ICD-10-CM

## 2025-07-10 DIAGNOSIS — Z13.1 SCREENING FOR DIABETES MELLITUS: ICD-10-CM

## 2025-07-10 DIAGNOSIS — Z00.00 ENCOUNTER FOR MEDICARE ANNUAL WELLNESS EXAM: Primary | ICD-10-CM

## 2025-07-10 DIAGNOSIS — D84.821 IMMUNODEFICIENCY DUE TO DRUGS: ICD-10-CM

## 2025-07-10 DIAGNOSIS — Z12.31 VISIT FOR SCREENING MAMMOGRAM: ICD-10-CM

## 2025-07-10 DIAGNOSIS — E66.01 CLASS 2 SEVERE OBESITY WITH SERIOUS COMORBIDITY AND BODY MASS INDEX (BMI) OF 39.0 TO 39.9 IN ADULT, UNSPECIFIED OBESITY TYPE (H): ICD-10-CM

## 2025-07-10 DIAGNOSIS — I10 PRIMARY HYPERTENSION: ICD-10-CM

## 2025-07-10 DIAGNOSIS — K21.00 GASTROESOPHAGEAL REFLUX DISEASE WITH ESOPHAGITIS WITHOUT HEMORRHAGE: ICD-10-CM

## 2025-07-10 DIAGNOSIS — Z91.89 AT RISK FOR OSTEOPOROSIS: ICD-10-CM

## 2025-07-10 DIAGNOSIS — E55.9 VITAMIN D DEFICIENCY: ICD-10-CM

## 2025-07-10 DIAGNOSIS — Z12.11 SCREEN FOR COLON CANCER: ICD-10-CM

## 2025-07-10 DIAGNOSIS — Z12.4 CERVICAL CANCER SCREENING: ICD-10-CM

## 2025-07-10 DIAGNOSIS — Z23 NEED FOR VACCINATION: ICD-10-CM

## 2025-07-10 DIAGNOSIS — N18.5 CKD (CHRONIC KIDNEY DISEASE) STAGE 5, GFR LESS THAN 15 ML/MIN (H): ICD-10-CM

## 2025-07-10 DIAGNOSIS — H61.22 IMPACTED CERUMEN OF LEFT EAR: ICD-10-CM

## 2025-07-10 DIAGNOSIS — E78.5 HYPERLIPIDEMIA LDL GOAL <100: ICD-10-CM

## 2025-07-10 DIAGNOSIS — Z79.899 IMMUNODEFICIENCY DUE TO DRUGS: ICD-10-CM

## 2025-07-10 DIAGNOSIS — Z94.0 STATUS POST KIDNEY TRANSPLANT: ICD-10-CM

## 2025-07-10 PROBLEM — F10.21 ALCOHOL DEPENDENCE IN REMISSION (H): Status: RESOLVED | Noted: 2023-01-27 | Resolved: 2025-07-10

## 2025-07-10 LAB
BASOPHILS # BLD AUTO: 0 10E3/UL (ref 0–0.2)
BASOPHILS NFR BLD AUTO: 0 %
EOSINOPHIL # BLD AUTO: 0.1 10E3/UL (ref 0–0.7)
EOSINOPHIL NFR BLD AUTO: 1 %
ERYTHROCYTE [DISTWIDTH] IN BLOOD BY AUTOMATED COUNT: 14.3 % (ref 10–15)
EST. AVERAGE GLUCOSE BLD GHB EST-MCNC: 88 MG/DL
HBA1C MFR BLD: 4.7 % (ref 0–5.6)
HCT VFR BLD AUTO: 34.3 % (ref 35–47)
HGB BLD-MCNC: 10.4 G/DL (ref 11.7–15.7)
IMM GRANULOCYTES # BLD: 0 10E3/UL
IMM GRANULOCYTES NFR BLD: 0 %
LYMPHOCYTES # BLD AUTO: 0.7 10E3/UL (ref 0.8–5.3)
LYMPHOCYTES NFR BLD AUTO: 13 %
MCH RBC QN AUTO: 31.6 PG (ref 26.5–33)
MCHC RBC AUTO-ENTMCNC: 30.3 G/DL (ref 31.5–36.5)
MCV RBC AUTO: 104 FL (ref 78–100)
MONOCYTES # BLD AUTO: 0.6 10E3/UL (ref 0–1.3)
MONOCYTES NFR BLD AUTO: 11 %
NEUTROPHILS # BLD AUTO: 4.1 10E3/UL (ref 1.6–8.3)
NEUTROPHILS NFR BLD AUTO: 74 %
PLATELET # BLD AUTO: 127 10E3/UL (ref 150–450)
RBC # BLD AUTO: 3.29 10E6/UL (ref 3.8–5.2)
WBC # BLD AUTO: 5.5 10E3/UL (ref 4–11)

## 2025-07-10 RX ORDER — TORSEMIDE 20 MG/1
20 TABLET ORAL DAILY
Qty: 90 TABLET | Refills: 3 | Status: SHIPPED | OUTPATIENT
Start: 2025-07-10

## 2025-07-10 RX ORDER — TIRZEPATIDE 2.5 MG/.5ML
2.5 INJECTION, SOLUTION SUBCUTANEOUS WEEKLY
COMMUNITY
Start: 2025-04-01

## 2025-07-10 RX ORDER — CARVEDILOL 6.25 MG/1
18.75 TABLET ORAL 2 TIMES DAILY
Qty: 540 TABLET | Refills: 3 | Status: SHIPPED | OUTPATIENT
Start: 2025-07-10

## 2025-07-10 RX ORDER — POTASSIUM CHLORIDE 1500 MG/1
40 TABLET, EXTENDED RELEASE ORAL
Qty: 180 TABLET | Refills: 3 | Status: SHIPPED | OUTPATIENT
Start: 2025-07-10

## 2025-07-10 RX ORDER — ATORVASTATIN CALCIUM 80 MG/1
80 TABLET, FILM COATED ORAL
Qty: 90 TABLET | Refills: 3 | Status: SHIPPED | OUTPATIENT
Start: 2025-07-10

## 2025-07-10 RX ORDER — PANTOPRAZOLE SODIUM 40 MG/1
40 TABLET, DELAYED RELEASE ORAL DAILY
Qty: 90 TABLET | Refills: 3 | Status: SHIPPED | OUTPATIENT
Start: 2025-07-10

## 2025-07-10 SDOH — HEALTH STABILITY: PHYSICAL HEALTH: ON AVERAGE, HOW MANY DAYS PER WEEK DO YOU ENGAGE IN MODERATE TO STRENUOUS EXERCISE (LIKE A BRISK WALK)?: 7 DAYS

## 2025-07-10 SDOH — HEALTH STABILITY: PHYSICAL HEALTH: ON AVERAGE, HOW MANY MINUTES DO YOU ENGAGE IN EXERCISE AT THIS LEVEL?: 150+ MIN

## 2025-07-10 ASSESSMENT — ASTHMA QUESTIONNAIRES
QUESTION_1 LAST FOUR WEEKS HOW MUCH OF THE TIME DID YOUR ASTHMA KEEP YOU FROM GETTING AS MUCH DONE AT WORK, SCHOOL OR AT HOME: NONE OF THE TIME
QUESTION_2 LAST FOUR WEEKS HOW OFTEN HAVE YOU HAD SHORTNESS OF BREATH: NOT AT ALL
QUESTION_3 LAST FOUR WEEKS HOW OFTEN DID YOUR ASTHMA SYMPTOMS (WHEEZING, COUGHING, SHORTNESS OF BREATH, CHEST TIGHTNESS OR PAIN) WAKE YOU UP AT NIGHT OR EARLIER THAN USUAL IN THE MORNING: NOT AT ALL
ACT_TOTALSCORE: 25
QUESTION_4 LAST FOUR WEEKS HOW OFTEN HAVE YOU USED YOUR RESCUE INHALER OR NEBULIZER MEDICATION (SUCH AS ALBUTEROL): NOT AT ALL
QUESTION_5 LAST FOUR WEEKS HOW WOULD YOU RATE YOUR ASTHMA CONTROL: COMPLETELY CONTROLLED

## 2025-07-10 ASSESSMENT — PAIN SCALES - GENERAL: PAINLEVEL_OUTOF10: NO PAIN (0)

## 2025-07-10 ASSESSMENT — SOCIAL DETERMINANTS OF HEALTH (SDOH): HOW OFTEN DO YOU GET TOGETHER WITH FRIENDS OR RELATIVES?: ONCE A WEEK

## 2025-07-10 NOTE — PATIENT INSTRUCTIONS
Have mammogram done    2. Schedule colonoscopy    3. Previous CT showed hysterectomy, unclear if you still have cervix, please see Gyn.     4. Labs today    5. Need 1200 mg of calcium daily, almond milk is 450 mg in a glass.    6. Get second Shingles vaccine at the pharmacy    7. Get pneumonia 20 vaccine at the pharmacy    8. Covid vaccine today                  Patient Education   Preventive Care Advice   This is general advice given by our system to help you stay healthy. However, your care team may have specific advice just for you. Please talk to your care team about your preventive care needs.  Nutrition  Eat 5 or more servings of fruits and vegetables each day.  Try wheat bread, brown rice and whole grain pasta (instead of white bread, rice, and pasta).  Get enough calcium and vitamin D. Check the label on foods and aim for 100% of the RDA (recommended daily allowance).  Lifestyle  Exercise at least 150 minutes each week  (30 minutes a day, 5 days a week).  Do muscle strengthening activities 2 days a week. These help control your weight and prevent disease.  No smoking.  Wear sunscreen to prevent skin cancer.  Have a dental exam and cleaning every 6 months.  Yearly exams  See your health care team every year to talk about:  Any changes in your health.  Any medicines your care team has prescribed.  Preventive care, family planning, and ways to prevent chronic diseases.  Shots (vaccines)   HPV shots (up to age 26), if you've never had them before.  Hepatitis B shots (up to age 59), if you've never had them before.  COVID-19 shot: Get this shot when it's due.  Flu shot: Get a flu shot every year.  Tetanus shot: Get a tetanus shot every 10 years.  Pneumococcal, hepatitis A, and RSV shots: Ask your care team if you need these based on your risk.  Shingles shot (for age 50 and up)  General health tests  Diabetes screening:  Starting at age 35, Get screened for diabetes at least every 3 years.  If you are younger  than age 35, ask your care team if you should be screened for diabetes.  Cholesterol test: At age 39, start having a cholesterol test every 5 years, or more often if advised.  Bone density scan (DEXA): At age 50, ask your care team if you should have this scan for osteoporosis (brittle bones).  Hepatitis C: Get tested at least once in your life.  STIs (sexually transmitted infections)  Before age 24: Ask your care team if you should be screened for STIs.  After age 24: Get screened for STIs if you're at risk. You are at risk for STIs (including HIV) if:  You are sexually active with more than one person.  You don't use condoms every time.  You or a partner was diagnosed with a sexually transmitted infection.  If you are at risk for HIV, ask about PrEP medicine to prevent HIV.  Get tested for HIV at least once in your life, whether you are at risk for HIV or not.  Cancer screening tests  Cervical cancer screening: If you have a cervix, begin getting regular cervical cancer screening tests starting at age 21.  Breast cancer scan (mammogram): If you've ever had breasts, begin having regular mammograms starting at age 40. This is a scan to check for breast cancer.  Colon cancer screening: It is important to start screening for colon cancer at age 45.  Have a colonoscopy test every 10 years (or more often if you're at risk) Or, ask your provider about stool tests like a FIT test every year or Cologuard test every 3 years.  To learn more about your testing options, visit:   .  For help making a decision, visit:   https://bit.ly/mh73613.  Prostate cancer screening test: If you have a prostate, ask your care team if a prostate cancer screening test (PSA) at age 55 is right for you.  Lung cancer screening: If you are a current or former smoker ages 50 to 80, ask your care team if ongoing lung cancer screenings are right for you.  For informational purposes only. Not to replace the advice of your health care provider.  Copyright   2023 [facility]. All rights reserved. Clinically reviewed by the [facility] Transitions Program. [employer] [id number] - REV 01/24.

## 2025-07-10 NOTE — PROGRESS NOTES
Preventive Care Visit  [facility]  Betty Vazquez MD, Internal Medicine  Jul 10, 2025      Assessment & Plan     Encounter for Medicare annual wellness exam  We will do labs today, she is overdue for mammogram, due to obesity recommend annual 3D mammogram.  Will get COVID-vaccine in today, additionally she will need second shingles booster at the pharmacy and pneumococcal 20 vaccine.  She had partial hysterectomy in her 30s, it is unclear whether her cervix is intact, will have her see gynecology to determine if further Paps are needed.  She did have negative Cologuard test 3 years ago, currently I would recommend that she has colonoscopy done due to better detection of polyps.    - MA Screening Bilateral w/ Joe; Future  - Hemoglobin A1c    Cervical cancer screening  History of partial hysterectomy  - Ob/Gyn  Referral; Future    Screen for colon cancer  - Colonoscopy Screening  Referral; Future    Status post kidney transplant  Due to membranous glomerular nephritis, she had transplant of the kidney several years ago which was complicated by ureteral stricture, currently she has stent out and kidney function has been very stable.  Followed by transplant clinic, she is on immunosuppressive regimen including chronic prednisone, did have DEXA scan done last year at [facility] and referred to endocrinology.  We did discuss calcium intake.  - CBC with platelets and differential  - Comprehensive metabolic panel (BMP + Alb, Alk Phos, ALT, AST, Total. Bili, TP)  - TSH with free T4 reflex    CKD (chronic kidney disease) stage 5, GFR less than 15 ml/min (H)  Baseline creatinine is 1.4  - torsemide (DEMADEX) 20 MG tablet; Take 1 tablet (20 mg) by mouth daily.  - potassium chloride ER (K-TAB) 20 MEQ CR tablet; Take 2 tablets (40 mEq) by mouth daily at 2 pm.  - Parathyroid Hormone Intact  - CBC with platelets and differential  - Comprehensive metabolic panel (BMP + Alb, Alk Phos, ALT, AST,  "Total. Bili, TP)  - TSH with free T4 reflex    Immunodeficiency due to drugs  Discussed vaccinations    Class 2 severe obesity with serious comorbidity and body mass index (BMI) of 39.0 to 39.9 in adult, unspecified obesity type (H)  She is currently on Zepbound 10 mg weekly    Hyperlipidemia LDL goal <100  On Lipitor  - atorvastatin (LIPITOR) 80 MG tablet; Take 1 tablet (80 mg) by mouth daily at 2 pm.  - Lipid panel reflex to direct LDL Non-fasting  - TSH with free T4 reflex    Gastroesophageal reflux disease with esophagitis without hemorrhage  Refills were provided, when she loses more weight we can try and taper Protonix dose  - pantoprazole (PROTONIX) 40 MG EC tablet; Take 1 tablet (40 mg) by mouth daily.  - CBC with platelets and differential    Primary hypertension  Very well-controlled on Coreg and isosorbide  - carvedilol (COREG) 6.25 MG tablet; Take 3 tablets (18.75 mg) by mouth 2 times daily.    Vitamin D deficiency  - Vitamin D Deficiency    At risk for osteoporosis  DEXA scan done 2024 at [facility]    Mitral valve disease  Followed by cardiology at [facility] echocardiogram in February of this year showed moderate to severe mitral valve stenosis and mild to moderate mitral valve regurgitation, mild to moderate tricuspid valve regurgitation, blood pressure is well-controlled on Imdur and carvedilol    Impacted cerumen of left ear  Earwax was successfully flushed out by our MA Cara  - REMOVE IMPACTED CERUMEN      Nicotine/Tobacco Cessation  She reports that she has been smoking cigarettes, cigarettes, and cigarettes. She has a 6 pack-year smoking history. She has never used smokeless tobacco.  Nicotine/Tobacco Cessation Plan  She has quit smoking several years ago      BMI  Estimated body mass index is 39.45 kg/m  as calculated from the following:    Height as of this encounter: 1.702 m (5' 7\").    Weight as of this encounter: 114.3 kg (251 lb 14.4 oz).       Counseling  Appropriate preventive services were " addressed with this patient via screening, questionnaire, or discussion as appropriate for fall prevention, nutrition, physical activity, Tobacco-use cessation, social engagement, weight loss and cognition.  Checklist reviewing preventive services available has been given to the patient.  Reviewed patient's diet, addressing concerns and/or questions.     The longitudinal plan of care for the diagnosis(es)/condition(s) as documented were addressed during this visit. Due to the added complexity in care, I will continue to support Alla in the subsequent management and with ongoing continuity of care.     Subjective   Alla is a 54 year old, presenting for the following:  Wellness Visit and Recheck Medication (Pt reports that she's here to complete her annual wellness visit)        7/10/2025     4:02 PM   Additional Questions   Roomed by cheyanne   Accompanied by alone         7/10/2025     4:02 PM   Patient Reported Additional Medications   Patient reports taking the following new medications none          HPI    Alla is a 54 y.o. 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  He is currently here for a wellness exam.    I have not seen Alla in the last 2 years, she has been getting a lot of care at [facility] where she had her kidney transplant and had slight complications with ureteral stricture and had very close monitoring there.  Most recently her kidney function has been doing well and she was stable and she was recommended to reestablish care with primary care.  Overall she is doing well.    Ureteral stricture has resolved and her stent is currently out.  Baseline creatinine is 1.4, she is followed  by neurology and the transplant team.    She has been on prednisone 5 mg long-term and is followed by nephrology for bone health and also was referred to endocrinology, she did have bone density test done last year at [facility].  Due to significant weight gain she was started on Zepbound and currently on 10.5 mg once a week, she lost 13 pounds and currently is planning to start exercising more, working on muscle mass and doing some weight lifting.    She is taking multivitamin and vitamin D supplement.    She has had partial hysterectomy in her 30s, unclear if she still has cervix intact, no vaginal complaints.    Review of smoking history was half a pack a day for 30 years but she quit several years ago.  No shortness of breath cough or pulmonary symptoms.    She denies any chest pains or palpitations with exertion.  She does see cardiologist at [facility] due to mitral valve regurgitation.  Blood pressure has been very well-controlled and she denies orthostasis.    She has appointment with ophthalmology scheduled, she wears glasses, hearing is slightly less in the left ear where there is earwax impaction, no recent falls, she does see dentist regularly.    Advance Care Planning  Discussed advance care planning with patient; however, patient declined at this time.        7/10/2025   General Health   How would you rate your overall physical health? (!) FAIR   Feel stress (tense, anxious, or unable to sleep) Not at all         7/10/2025   Nutrition   Diet: Low salt         7/10/2025   Exercise   Days per week of moderate/strenous exercise 7 days   Average minutes spent exercising at this level 150+ min         7/10/2025   Social Factors   Frequency of gathering with friends or relatives Once a week   Worry food won't last until get money to buy more No   Food not last or not have enough money for food? No   Do you have housing? (Housing is defined as stable permanent housing and does not include staying outside in a car, in a  tent, in an abandoned building, in an overnight shelter, or couch-surfing.) Yes   Are you worried about losing your housing? No   Lack of transportation? No   Unable to get utilities (heat,electricity)? No         7/10/2025   Fall Risk   Fallen 2 or more times in the past year? No    Trouble with walking or balance? No        Proxy-reported          7/10/2025   Activities of Daily Living- Home Safety   Needs help with the following daily activites None of the above   Safety concerns in the home None of the above         7/10/2025   Dental   Dentist two times every year? Yes         7/10/2025   Hearing Screening   Hearing concerns? None of the above         7/10/2025   Driving Risk Screening   Patient/family members have concerns about driving No         7/10/2025   General Alertness/Fatigue Screening   Have you been more tired than usual lately? No         7/10/2025   Urinary Incontinence Screening   Bothered by leaking urine in past 6 months No         Today's PHQ-2 Score:       7/10/2025     4:01 PM   PHQ-2 ( 1999 Pf[employer])   Q1: Little interest or pleasure in doing things 0    Q2: Feeling down, depressed or hopeless 0    PHQ-2 Score 0    Q1: Little interest or pleasure in doing things Not at all   Q2: Feeling down, depressed or hopeless Not at all   PHQ-2 Score 0       Proxy-reported           7/10/2025   Substance Use   Alcohol more than 3/day or more than 7/wk No   Do you have a current opioid prescription? No   How severe/bad is pain from 1 to 10? 0/10 (No Pain)   Do you use any other substances recreationally? No     Social History     Tobacco Use    Smoking status: Every Day     Current packs/day: 0.30     Average packs/day: 0.3 packs/day for 20.0 years (6.0 ttl pk-yrs)     Types: Cigarettes    Smokeless tobacco: Never    Tobacco comments:     6 cigs /day   Substance Use Topics    Alcohol use: Yes     Comment: Alcoholic Drinks/day: rare    Drug use: No         1/3/2023   LAST FHS-7 RESULTS   1st degree  relative breast or ovarian cancer No   Any relative bilateral breast cancer No   Any male have breast cancer No   Any ONE woman have BOTH breast AND ovarian cancer No   Any woman with breast cancer before 50yrs No   2 or more relatives with breast AND/OR ovarian cancer No   2 or more relatives with breast AND/OR bowel cancer No     History of abnormal Pap smear:         Latest Ref Rng & Units 8/3/2018     4:11 PM   PAP / HPV   PAP  Negative for squamous intraepithelial lesion or malignancy  Electronically signed by Rosalind Knowles CT (ASCP) on 8/10/2018 at 10:38 AM      HPV 16 DNA NEG Negative    HPV 18 DNA NEG Negative    Other HR HPV NEG Negative      ASCVD Risk   The 10-year ASCVD risk score (Luis E DENTON, et al., 2019) is: 4.4%    Values used to calculate the score:      Age: 54 years      Sex: Female      Is Non- : Yes      Diabetic: No      Tobacco smoker: Yes      Systolic Blood Pressure: 100 mmHg      Is BP treated: Yes      HDL Cholesterol: 37 mg/dL      Total Cholesterol: 151 mg/dL      Reviewed and updated as needed this visit by Provider                    Current providers sharing in care for this patient include:  Patient Care Team:  Betty Vazquez MD as PCP -   CapellanLiliya pearson MD as MD (Nephrology)  Sunshine Turner MD as MD (Internal Medicine)  Magdiel Rothman MD as MD (Nephrology)  Betty Vazquez MD as Assigned PCP  Felton Castillo RPH as Pharmacist  Felton Castillo RPH as Assigned MTM Pharmacist    The following health maintenance items are reviewed in Epic and correct as of today:  Health Maintenance   Topic Date Due    PARATHYROID  Never done    ASTHMA ACTION PLAN  Never done    ADVANCE CARE PLANNING  Never done    HEPATITIS B VACCINE (6 of 6 - Risk Dialysis Recombivax 3-dose series) 10/10/2019    MEDICARE ANNUAL WELLNESS VISIT  03/03/2023    LIPID  03/03/2023    ANNUAL REVIEW OF HM ORDERS  03/03/2023     "PNEUMOCOCCAL VACCINE 50+ YEARS (3 of 3 - PPSV23, PCV20 or PCV21) 08/03/2023    COVID-19 VACCINE (6 - 2024-25 season) 09/01/2024    BMP  12/12/2024    COLORECTAL CANCER SCREENING  12/23/2024    MAMMO SCREENING  02/15/2025    HEMOGLOBIN  03/12/2025    ZOSTER VACCINE (2 of 2) 06/08/2025    INFLUENZA VACCINE (1) 09/01/2025    ASTHMA CONTROL TEST  01/10/2026    DTAP/TDAP/TD VACCINE (2 - Td or Tdap) 07/31/2027    DIABETES SCREENING  09/12/2027    HPV TEST  Completed    PHOSPHORUS  Completed    HEPATITIS C SCREENING  Completed    HIV SCREENING  Completed    PHQ-2 (once per calendar year)  Completed    URINALYSIS  Completed    ALK PHOS  Completed    HPV VACCINE  Aged Out    MENINGITIS VACCINE  Aged Out    MICROALBUMIN  Discontinued    TSH W/FREE T4 REFLEX  Discontinued    PAP  Discontinued    LUNG CANCER SCREENING  Discontinued     Review of systems: As above     Objective    Exam  /64 (BP Location: Left arm, Patient Position: Sitting, Cuff Size: Adult Regular)   Pulse 70   Temp 97.7  F (36.5  C) (Tympanic)   Resp 15   Ht 1.702 m (5' 7\")   Wt 114.3 kg (251 lb 14.4 oz)   LMP  (LMP Unknown)   SpO2 100%   Breastfeeding No   BMI 39.45 kg/m     Estimated body mass index is 39.45 kg/m  as calculated from the following:    Height as of this encounter: 1.702 m (5' 7\").    Weight as of this encounter: 114.3 kg (251 lb 14.4 oz).    Physical Exam  General: well appearing female, alert and oriented x3  EYES: Eyelids, conjunctiva, and sclera were normal. Pupils were normal.   HEAD, EARS, NOSE, MOUTH, AND THROAT: no cervical LAD, no thyromegaly or nodules appreciated. TMs are visualized and normal, oropharynx is clear.  RESPIRATORY: respirations non labored, CTA bl, no wheezes, rales, no forced expiratory wheezing.  CARDIOVASCULAR: Heart rate and rhythm were normal. No murmurs, rubs,gallops. There was no peripheral edema. No carotid bruits.  GASTROINTESTINAL: Positive bowel sounds, abdomen is soft, non tender, non " distended.     MUSCULOSKELETAL: Muscle mass was normal for age. No joint synovitis or deformity.  LYMPHATIC: There were no enlarged nodes palpable.  SKIN/HAIR/NAILS: Skin color was normal.  No rashes.  NEUROLOGIC: The patient was alert and oriented.  Speech was normal.  There is no facial asymmetry.   PSYCHIATRIC:  Mood and affect were normal.   Breast exam: No axilla lymphadenopathy, breast masses or skin changes appreciated    Patient identified using two patient identifiers.  Ear exam showing wax occlusion completed by ARCELIA Marroquin.  Solution: warm water was placed in the left ear(s) via irrigation tool: elephant ear. Impacted cerumen successfully removed without any complications. Patient tolerated procedure will without any incidents. No bleeding noted post left ear irrigation.           7/10/2025   Mini Cog   Clock Draw Score 2 Normal   3 Item Recall 3 objects recalled   Mini Cog Total Score 5              Signed Electronically by: Betty Vazquez MD

## 2025-07-14 ENCOUNTER — PATIENT OUTREACH (OUTPATIENT)
Dept: CARE COORDINATION | Facility: CLINIC | Age: 55
End: 2025-07-14
Payer: COMMERCIAL

## 2025-08-14 ENCOUNTER — PATIENT OUTREACH (OUTPATIENT)
Dept: CARE COORDINATION | Facility: CLINIC | Age: 55
End: 2025-08-14
Payer: COMMERCIAL

## 2025-08-21 ENCOUNTER — TRANSFERRED RECORDS (OUTPATIENT)
Dept: HEALTH INFORMATION MANAGEMENT | Facility: CLINIC | Age: 55
End: 2025-08-21
Payer: COMMERCIAL

## (undated) RX ORDER — LIDOCAINE HYDROCHLORIDE 10 MG/ML
INJECTION, SOLUTION EPIDURAL; INFILTRATION; INTRACAUDAL; PERINEURAL
Status: DISPENSED
Start: 2023-02-20

## (undated) RX ORDER — ALBUTEROL SULFATE 0.83 MG/ML
SOLUTION RESPIRATORY (INHALATION)
Status: DISPENSED
Start: 2018-06-11